# Patient Record
Sex: FEMALE | Race: BLACK OR AFRICAN AMERICAN | NOT HISPANIC OR LATINO
[De-identification: names, ages, dates, MRNs, and addresses within clinical notes are randomized per-mention and may not be internally consistent; named-entity substitution may affect disease eponyms.]

---

## 2017-01-25 ENCOUNTER — APPOINTMENT (OUTPATIENT)
Dept: GASTROENTEROLOGY | Facility: CLINIC | Age: 82
End: 2017-01-25

## 2017-02-01 ENCOUNTER — APPOINTMENT (OUTPATIENT)
Dept: GASTROENTEROLOGY | Facility: CLINIC | Age: 82
End: 2017-02-01

## 2017-04-14 ENCOUNTER — RX RENEWAL (OUTPATIENT)
Age: 82
End: 2017-04-14

## 2017-04-19 ENCOUNTER — APPOINTMENT (OUTPATIENT)
Dept: GASTROENTEROLOGY | Facility: CLINIC | Age: 82
End: 2017-04-19

## 2017-08-02 ENCOUNTER — APPOINTMENT (OUTPATIENT)
Dept: GASTROENTEROLOGY | Facility: CLINIC | Age: 82
End: 2017-08-02
Payer: MEDICARE

## 2017-08-02 VITALS
SYSTOLIC BLOOD PRESSURE: 120 MMHG | WEIGHT: 141 LBS | DIASTOLIC BLOOD PRESSURE: 80 MMHG | OXYGEN SATURATION: 98 % | TEMPERATURE: 98.2 F | HEART RATE: 67 BPM | BODY MASS INDEX: 24.2 KG/M2

## 2017-08-02 DIAGNOSIS — K59.09 OTHER CONSTIPATION: ICD-10-CM

## 2017-08-02 PROCEDURE — 99213 OFFICE O/P EST LOW 20 MIN: CPT

## 2017-11-06 ENCOUNTER — RX RENEWAL (OUTPATIENT)
Age: 82
End: 2017-11-06

## 2017-11-16 ENCOUNTER — INPATIENT (INPATIENT)
Facility: HOSPITAL | Age: 82
LOS: 1 days | Discharge: HOME | End: 2017-11-18
Attending: INTERNAL MEDICINE | Admitting: INTERNAL MEDICINE
Payer: MEDICARE

## 2017-11-16 VITALS
HEIGHT: 64 IN | TEMPERATURE: 98 F | SYSTOLIC BLOOD PRESSURE: 120 MMHG | OXYGEN SATURATION: 100 % | RESPIRATION RATE: 19 BRPM | DIASTOLIC BLOOD PRESSURE: 68 MMHG | WEIGHT: 145.06 LBS | HEART RATE: 76 BPM

## 2017-11-16 DIAGNOSIS — Z90.49 ACQUIRED ABSENCE OF OTHER SPECIFIED PARTS OF DIGESTIVE TRACT: Chronic | ICD-10-CM

## 2017-11-16 DIAGNOSIS — Z98.89 OTHER SPECIFIED POSTPROCEDURAL STATES: Chronic | ICD-10-CM

## 2017-11-16 DIAGNOSIS — Z29.9 ENCOUNTER FOR PROPHYLACTIC MEASURES, UNSPECIFIED: ICD-10-CM

## 2017-11-16 DIAGNOSIS — I10 ESSENTIAL (PRIMARY) HYPERTENSION: ICD-10-CM

## 2017-11-16 DIAGNOSIS — A41.9 SEPSIS, UNSPECIFIED ORGANISM: ICD-10-CM

## 2017-11-16 DIAGNOSIS — D86.0 SARCOIDOSIS OF LUNG: ICD-10-CM

## 2017-11-16 DIAGNOSIS — E78.2 MIXED HYPERLIPIDEMIA: ICD-10-CM

## 2017-11-16 DIAGNOSIS — E11.9 TYPE 2 DIABETES MELLITUS WITHOUT COMPLICATIONS: ICD-10-CM

## 2017-11-16 LAB
ALBUMIN SERPL ELPH-MCNC: 2.6 G/DL — LOW (ref 3.3–5)
ALP SERPL-CCNC: 89 U/L — SIGNIFICANT CHANGE UP (ref 40–120)
ALT FLD-CCNC: 32 U/L — SIGNIFICANT CHANGE UP (ref 12–78)
ANION GAP SERPL CALC-SCNC: 9 MMOL/L — SIGNIFICANT CHANGE UP (ref 5–17)
APPEARANCE UR: CLEAR — SIGNIFICANT CHANGE UP
APTT BLD: 27.5 SEC — SIGNIFICANT CHANGE UP (ref 27.5–37.4)
AST SERPL-CCNC: 31 U/L — SIGNIFICANT CHANGE UP (ref 15–37)
BACTERIA # UR AUTO: ABNORMAL
BILIRUB SERPL-MCNC: 0.3 MG/DL — SIGNIFICANT CHANGE UP (ref 0.2–1.2)
BILIRUB UR-MCNC: NEGATIVE — SIGNIFICANT CHANGE UP
BLD GP AB SCN SERPL QL: SIGNIFICANT CHANGE UP
BUN SERPL-MCNC: 23 MG/DL — SIGNIFICANT CHANGE UP (ref 7–23)
CALCIUM SERPL-MCNC: 9.4 MG/DL — SIGNIFICANT CHANGE UP (ref 8.5–10.1)
CHLORIDE SERPL-SCNC: 100 MMOL/L — SIGNIFICANT CHANGE UP (ref 96–108)
CO2 SERPL-SCNC: 27 MMOL/L — SIGNIFICANT CHANGE UP (ref 22–31)
COLOR SPEC: YELLOW — SIGNIFICANT CHANGE UP
CREAT SERPL-MCNC: 1.12 MG/DL — SIGNIFICANT CHANGE UP (ref 0.5–1.3)
DIFF PNL FLD: ABNORMAL
ELLIPTOCYTES BLD QL SMEAR: SLIGHT — SIGNIFICANT CHANGE UP
EOSINOPHIL NFR BLD AUTO: 4 % — SIGNIFICANT CHANGE UP (ref 0–6)
EPI CELLS # UR: SIGNIFICANT CHANGE UP
GLUCOSE BLDC GLUCOMTR-MCNC: 115 MG/DL — HIGH (ref 70–99)
GLUCOSE SERPL-MCNC: 105 MG/DL — HIGH (ref 70–99)
GLUCOSE UR QL: NEGATIVE MG/DL — SIGNIFICANT CHANGE UP
HCT VFR BLD CALC: 32 % — LOW (ref 34.5–45)
HGB BLD-MCNC: 10.2 G/DL — LOW (ref 11.5–15.5)
HYPOCHROMIA BLD QL: SIGNIFICANT CHANGE UP
INR BLD: 1.01 RATIO — SIGNIFICANT CHANGE UP (ref 0.88–1.16)
KETONES UR-MCNC: NEGATIVE — SIGNIFICANT CHANGE UP
LEUKOCYTE ESTERASE UR-ACNC: ABNORMAL
LG PLATELETS BLD QL AUTO: SLIGHT — SIGNIFICANT CHANGE UP
LIDOCAIN IGE QN: 286 U/L — SIGNIFICANT CHANGE UP (ref 73–393)
LYMPHOCYTES # BLD AUTO: 8 % — LOW (ref 13–44)
MACROCYTES BLD QL: SLIGHT — SIGNIFICANT CHANGE UP
MCHC RBC-ENTMCNC: 26.4 PG — LOW (ref 27–34)
MCHC RBC-ENTMCNC: 31.7 GM/DL — LOW (ref 32–36)
MCV RBC AUTO: 83.3 FL — SIGNIFICANT CHANGE UP (ref 80–100)
MICROCYTES BLD QL: SIGNIFICANT CHANGE UP
MONOCYTES NFR BLD AUTO: 3 % — SIGNIFICANT CHANGE UP (ref 2–14)
NEUTROPHILS NFR BLD AUTO: 85 % — HIGH (ref 43–77)
NEUTS HYPERSEG # BLD: PRESENT — SIGNIFICANT CHANGE UP
NITRITE UR-MCNC: NEGATIVE — SIGNIFICANT CHANGE UP
OVALOCYTES BLD QL SMEAR: SLIGHT — SIGNIFICANT CHANGE UP
PH UR: 6 — SIGNIFICANT CHANGE UP (ref 5–8)
PLAT MORPH BLD: NORMAL — SIGNIFICANT CHANGE UP
PLATELET # BLD AUTO: 447 K/UL — HIGH (ref 150–400)
POLYCHROMASIA BLD QL SMEAR: SLIGHT — SIGNIFICANT CHANGE UP
POTASSIUM SERPL-MCNC: 3.4 MMOL/L — LOW (ref 3.5–5.3)
POTASSIUM SERPL-SCNC: 3.4 MMOL/L — LOW (ref 3.5–5.3)
PROT SERPL-MCNC: 8 GM/DL — SIGNIFICANT CHANGE UP (ref 6–8.3)
PROT UR-MCNC: 30 MG/DL
PROTHROM AB SERPL-ACNC: 11 SEC — SIGNIFICANT CHANGE UP (ref 9.8–12.7)
RBC # BLD: 3.85 M/UL — SIGNIFICANT CHANGE UP (ref 3.8–5.2)
RBC # FLD: 12.5 % — SIGNIFICANT CHANGE UP (ref 11–15)
RBC BLD AUTO: ABNORMAL
RBC CASTS # UR COMP ASSIST: ABNORMAL /HPF (ref 0–4)
SMUDGE CELLS # BLD: PRESENT — SIGNIFICANT CHANGE UP
SODIUM SERPL-SCNC: 136 MMOL/L — SIGNIFICANT CHANGE UP (ref 135–145)
SP GR SPEC: 1.01 — SIGNIFICANT CHANGE UP (ref 1.01–1.02)
STOMATOCYTES BLD QL SMEAR: PRESENT — SIGNIFICANT CHANGE UP
TOXIC GRANULES BLD QL SMEAR: PRESENT — SIGNIFICANT CHANGE UP
TROPONIN I SERPL-MCNC: <.015 NG/ML — SIGNIFICANT CHANGE UP (ref 0.01–0.04)
UROBILINOGEN FLD QL: NEGATIVE MG/DL — SIGNIFICANT CHANGE UP
WBC # BLD: 16.6 K/UL — HIGH (ref 3.8–10.5)
WBC # FLD AUTO: 16.6 K/UL — HIGH (ref 3.8–10.5)
WBC UR QL: ABNORMAL

## 2017-11-16 PROCEDURE — 99285 EMERGENCY DEPT VISIT HI MDM: CPT

## 2017-11-16 PROCEDURE — 93010 ELECTROCARDIOGRAM REPORT: CPT

## 2017-11-16 PROCEDURE — 99223 1ST HOSP IP/OBS HIGH 75: CPT

## 2017-11-16 PROCEDURE — 74176 CT ABD & PELVIS W/O CONTRAST: CPT | Mod: 26

## 2017-11-16 RX ORDER — CEFTRIAXONE 500 MG/1
1 INJECTION, POWDER, FOR SOLUTION INTRAMUSCULAR; INTRAVENOUS EVERY 24 HOURS
Qty: 0 | Refills: 0 | Status: DISCONTINUED | OUTPATIENT
Start: 2017-11-17 | End: 2017-11-18

## 2017-11-16 RX ORDER — METOPROLOL TARTRATE 50 MG
50 TABLET ORAL DAILY
Qty: 0 | Refills: 0 | Status: DISCONTINUED | OUTPATIENT
Start: 2017-11-16 | End: 2017-11-18

## 2017-11-16 RX ORDER — DEXTROSE 50 % IN WATER 50 %
25 SYRINGE (ML) INTRAVENOUS ONCE
Qty: 0 | Refills: 0 | Status: DISCONTINUED | OUTPATIENT
Start: 2017-11-16 | End: 2017-11-18

## 2017-11-16 RX ORDER — INSULIN LISPRO 100/ML
VIAL (ML) SUBCUTANEOUS AT BEDTIME
Qty: 0 | Refills: 0 | Status: DISCONTINUED | OUTPATIENT
Start: 2017-11-16 | End: 2017-11-18

## 2017-11-16 RX ORDER — ONDANSETRON 8 MG/1
4 TABLET, FILM COATED ORAL ONCE
Qty: 0 | Refills: 0 | Status: COMPLETED | OUTPATIENT
Start: 2017-11-16 | End: 2017-11-16

## 2017-11-16 RX ORDER — ATORVASTATIN CALCIUM 80 MG/1
20 TABLET, FILM COATED ORAL AT BEDTIME
Qty: 0 | Refills: 0 | Status: DISCONTINUED | OUTPATIENT
Start: 2017-11-16 | End: 2017-11-18

## 2017-11-16 RX ORDER — PIPERACILLIN AND TAZOBACTAM 4; .5 G/20ML; G/20ML
3.38 INJECTION, POWDER, LYOPHILIZED, FOR SOLUTION INTRAVENOUS ONCE
Qty: 0 | Refills: 0 | Status: COMPLETED | OUTPATIENT
Start: 2017-11-16 | End: 2017-11-16

## 2017-11-16 RX ORDER — ACETAMINOPHEN 500 MG
650 TABLET ORAL EVERY 6 HOURS
Qty: 0 | Refills: 0 | Status: DISCONTINUED | OUTPATIENT
Start: 2017-11-16 | End: 2017-11-18

## 2017-11-16 RX ORDER — SODIUM CHLORIDE 9 MG/ML
1000 INJECTION, SOLUTION INTRAVENOUS
Qty: 0 | Refills: 0 | Status: DISCONTINUED | OUTPATIENT
Start: 2017-11-16 | End: 2017-11-18

## 2017-11-16 RX ORDER — SODIUM CHLORIDE 9 MG/ML
1000 INJECTION INTRAMUSCULAR; INTRAVENOUS; SUBCUTANEOUS
Qty: 0 | Refills: 0 | Status: DISCONTINUED | OUTPATIENT
Start: 2017-11-16 | End: 2017-11-18

## 2017-11-16 RX ORDER — INSULIN GLARGINE 100 [IU]/ML
10 INJECTION, SOLUTION SUBCUTANEOUS AT BEDTIME
Qty: 0 | Refills: 0 | Status: DISCONTINUED | OUTPATIENT
Start: 2017-11-16 | End: 2017-11-18

## 2017-11-16 RX ORDER — ONDANSETRON 8 MG/1
4 TABLET, FILM COATED ORAL EVERY 8 HOURS
Qty: 0 | Refills: 0 | Status: DISCONTINUED | OUTPATIENT
Start: 2017-11-16 | End: 2017-11-18

## 2017-11-16 RX ORDER — INSULIN LISPRO 100/ML
VIAL (ML) SUBCUTANEOUS
Qty: 0 | Refills: 0 | Status: DISCONTINUED | OUTPATIENT
Start: 2017-11-16 | End: 2017-11-18

## 2017-11-16 RX ORDER — AMLODIPINE BESYLATE 2.5 MG/1
5 TABLET ORAL DAILY
Qty: 0 | Refills: 0 | Status: DISCONTINUED | OUTPATIENT
Start: 2017-11-16 | End: 2017-11-18

## 2017-11-16 RX ORDER — HYDROCHLOROTHIAZIDE 25 MG
12.5 TABLET ORAL DAILY
Qty: 0 | Refills: 0 | Status: DISCONTINUED | OUTPATIENT
Start: 2017-11-16 | End: 2017-11-18

## 2017-11-16 RX ORDER — DEXTROSE 50 % IN WATER 50 %
1 SYRINGE (ML) INTRAVENOUS ONCE
Qty: 0 | Refills: 0 | Status: DISCONTINUED | OUTPATIENT
Start: 2017-11-16 | End: 2017-11-18

## 2017-11-16 RX ORDER — HEPARIN SODIUM 5000 [USP'U]/ML
5000 INJECTION INTRAVENOUS; SUBCUTANEOUS EVERY 8 HOURS
Qty: 0 | Refills: 0 | Status: DISCONTINUED | OUTPATIENT
Start: 2017-11-16 | End: 2017-11-18

## 2017-11-16 RX ORDER — GLUCAGON INJECTION, SOLUTION 0.5 MG/.1ML
1 INJECTION, SOLUTION SUBCUTANEOUS ONCE
Qty: 0 | Refills: 0 | Status: DISCONTINUED | OUTPATIENT
Start: 2017-11-16 | End: 2017-11-18

## 2017-11-16 RX ORDER — VALSARTAN 80 MG/1
80 TABLET ORAL DAILY
Qty: 0 | Refills: 0 | Status: DISCONTINUED | OUTPATIENT
Start: 2017-11-16 | End: 2017-11-18

## 2017-11-16 RX ORDER — SODIUM CHLORIDE 9 MG/ML
1000 INJECTION INTRAMUSCULAR; INTRAVENOUS; SUBCUTANEOUS ONCE
Qty: 0 | Refills: 0 | Status: COMPLETED | OUTPATIENT
Start: 2017-11-16 | End: 2017-11-16

## 2017-11-16 RX ORDER — POTASSIUM CHLORIDE 20 MEQ
20 PACKET (EA) ORAL ONCE
Qty: 0 | Refills: 0 | Status: COMPLETED | OUTPATIENT
Start: 2017-11-16 | End: 2017-11-16

## 2017-11-16 RX ORDER — DEXTROSE 50 % IN WATER 50 %
12.5 SYRINGE (ML) INTRAVENOUS ONCE
Qty: 0 | Refills: 0 | Status: DISCONTINUED | OUTPATIENT
Start: 2017-11-16 | End: 2017-11-18

## 2017-11-16 RX ADMIN — PIPERACILLIN AND TAZOBACTAM 200 GRAM(S): 4; .5 INJECTION, POWDER, LYOPHILIZED, FOR SOLUTION INTRAVENOUS at 22:02

## 2017-11-16 RX ADMIN — Medication 20 MILLIEQUIVALENT(S): at 18:52

## 2017-11-16 RX ADMIN — ONDANSETRON 4 MILLIGRAM(S): 8 TABLET, FILM COATED ORAL at 16:32

## 2017-11-16 RX ADMIN — HEPARIN SODIUM 5000 UNIT(S): 5000 INJECTION INTRAVENOUS; SUBCUTANEOUS at 22:03

## 2017-11-16 RX ADMIN — SODIUM CHLORIDE 2000 MILLILITER(S): 9 INJECTION INTRAMUSCULAR; INTRAVENOUS; SUBCUTANEOUS at 16:33

## 2017-11-16 NOTE — ED ADULT NURSE NOTE - PSH
H/O cataract extraction, right  with laser revision 3/2016  S/P appendectomy    S/P cholecystectomy  11/2015  S/P hysterectomy  with BLSO  S/P knee surgery  for right torn meniscual

## 2017-11-16 NOTE — ED PROVIDER NOTE - OBJECTIVE STATEMENT
Pertinent PMH/PSH/FHx/SHx and Review of Systems contained within:    81 y/o F w hx of htn, hld, DM, "vasculitis of my kidney", presents w vomiting; patient reports she was in [facility] for 4 days and returned yesterday; patient reports vomiting and inability to tolerate PO intake since yesterday; denies abd pain; denies headache; denies diarrhea; last BM was about 5 or 6 days ago; denies abd distention; passing flatus; patient reports she was diagnosed w UTI while in [place] and was treated with 2 doses of cipro; denies malodorous urine or urine discomfort which she previously had; denies fever or chills or flank pain; ROS is otherwise negative    PMH: as above  allergeis: sulfa; codeine; IV contrast; iodine  SH: denies cig or drug use

## 2017-11-16 NOTE — H&P ADULT - ASSESSMENT
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  Patient will require admission for at least 2 midnights for IV abx treatment of UTI as detailed below:    IMPROVE VTE Individual Risk Assessment          RISK                                                          Points    [  ] Previous VTE                                                3    [  ] Thrombophilia                                             2    [  ] Lower limb paralysis                                    2        (unable to hold up >15 seconds)      [  ] Current Cancer                                             2         (within 6 months)    [ x ] Immobilization > 24 hrs                              1    [  ] ICU/CCU stay > 24 hours                            1    [ x ] Age > 60                                                    1    IMPROVE VTE Score _______2__

## 2017-11-16 NOTE — H&P ADULT - PROBLEM SELECTOR PLAN 1
Started on Zosyn in ED, can de-escalate to Rocephin  Urine Cx, Blood Cx 2 sets.  Monitor CBC & BMP Qdaily.  Start IV Fluids NS@80ml/hr for 24 hrs, then re-evaluate.  Tylenol 650mg po q6hrs PRN for fever.  Zofran PRN

## 2017-11-16 NOTE — ED ADULT TRIAGE NOTE - CHIEF COMPLAINT QUOTE
patient c/o of vomiting started yesterday , denied any pain , no dizziness , patient come yesterday from [employer] , denied diarrhea

## 2017-11-16 NOTE — ED PROVIDER NOTE - PROGRESS NOTE DETAILS
Precious: pt signed out to me, neg ct scan, elevated wbc, plan for admission given vomiting and recent uti. Dr. Car aware. received abd and fluid. no severe sepsis/septic shock based on vitals.

## 2017-11-16 NOTE — ED ADULT NURSE NOTE - PMH
Anemia    ARF (acute renal failure)  with glomerulonephritis  Ectopic fetus    Hernia  as a child  HLD (hyperlipidemia)    HTN (hypertension)    Mass  calcified mass left hip area, 2011  Murmur    Sarcoidosis of lung

## 2017-11-16 NOTE — ED ADULT NURSE NOTE - CHIEF COMPLAINT QUOTE
patient c/o of vomiting started yesterday , denied any pain , no dizziness , patient come yesterday from [place] , denied diarrhea

## 2017-11-16 NOTE — H&P ADULT - NSHPLABSRESULTS_GEN_ALL_CORE
Vital Signs Last 24 Hrs  T(C): 36.8 (2017 21:03), Max: 36.8 (2017 21:03)  T(F): 98.2 (2017 21:03), Max: 98.2 (2017 21:03)  HR: 89 (2017 21:03) (76 - 89)  BP: 123/80 (2017 21:03) (120/68 - 123/80)  BP(mean): --  RR: 18 (2017 21:03) (18 - 19)  SpO2: 99% (2017 21:03) (99% - 100%)        LABS:                        10.2   16.6  )-----------( 447      ( 2017 17:37 )             32.0     11-16    136  |  100  |  23  ----------------------------<  105<H>  3.4<L>   |  27  |  1.12    Ca    9.4      2017 17:37    TPro  8.0  /  Alb  2.6<L>  /  TBili  0.3  /  DBili  x   /  AST  31  /  ALT  32  /  AlkPhos  89  11-16    PT/INR - ( 2017 17:37 )   PT: 11.0 sec;   INR: 1.01 ratio         PTT - ( 2017 17:37 )  PTT:27.5 sec  Urinalysis Basic - ( 2017 19:09 )    Color: Yellow / Appearance: Clear / S.010 / pH: x  Gluc: x / Ketone: Negative  / Bili: Negative / Urobili: Negative mg/dL   Blood: x / Protein: 30 mg/dL / Nitrite: Negative   Leuk Esterase: Trace / RBC: 25-50 /HPF / WBC 6-10   Sq Epi: x / Non Sq Epi: Few / Bacteria: Moderate        RADIOLOGY & ADDITIONAL STUDIES:    CT ab/plv:  IMPRESSION:   No bowel obstruction.

## 2017-11-16 NOTE — ED PROVIDER NOTE - MEDICAL DECISION MAKING DETAILS
A/P: 83 y/o F w vomiting since yesterday; abd exam benign; neuro intact; will get ekg, labs, ua/culture, treat symptomatically and reassess

## 2017-11-16 NOTE — ED PROVIDER NOTE - PHYSICAL EXAMINATION
Gen: well appearing, in no distress  HEENT: clear oropharynx; EOMI; PERRL ~ 2mm;   neck: full ROM  CV: RRR; no m/g/r  lungs;CTAB; no w/r/r  abd: soft, nd, nt; non tender in all areas of abdomen; no cva ttp  back: non tender  ext: wwp; full ROM  neuro: CN 2-12 nl; no focal weakness/sensory changes

## 2017-11-16 NOTE — ED ADULT NURSE REASSESSMENT NOTE - NS ED NURSE REASSESS COMMENT FT1
report given by outgoing RN, patient is in CAT scan, patient back to bed, remains alert and orientedx3, no complaints made, no distress/discomfort observed

## 2017-11-16 NOTE — H&P ADULT - HISTORY OF PRESENT ILLNESS
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  She returned yesterday after spending 4 days there and her symptoms have not improved.  She reports lower abdominal pain, fever, chills, and dysuria.      In the ED, CT ab shows leukocytosis with left shift, UA + for UTI, Tmax 101.3.  CT ab/plv neg for acute findings. 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  She returned yesterday after spending 4 days there and her symptoms have not improved.  She reports lower abdominal pain, fever, chills, and dysuria.      In the ED, CT ab shows leukocytosis with left shift, UA + for UTI, Tmax 101.3.  CT ab/plv neg for acute findings..

## 2017-11-16 NOTE — H&P ADULT - NSHPREVIEWOFSYSTEMS_GEN_ALL_CORE
No photophobia/eye pain/changes in vision,  No chest pain/palpitations, no SOB/cough/wheeze/stridor,  No neck/back pain, no rash, no changes in neurological status/function.

## 2017-11-16 NOTE — H&P ADULT - FAMILY HISTORY
Sibling  Still living? Yes, Estimated age: Age Unknown  Type 2 diabetes mellitus, Age at diagnosis: Age Unknown

## 2017-11-16 NOTE — ED ADULT NURSE NOTE - OBJECTIVE STATEMENT
patient is a 82yr old female, a&ox3c/o of vomiting started yesterday , denied any pain , no dizziness , patient come yesterday from [place] , denied diarrhea

## 2017-11-17 LAB
ANION GAP SERPL CALC-SCNC: 10 MMOL/L — SIGNIFICANT CHANGE UP (ref 5–17)
BUN SERPL-MCNC: 18 MG/DL — SIGNIFICANT CHANGE UP (ref 7–23)
CALCIUM SERPL-MCNC: 9 MG/DL — SIGNIFICANT CHANGE UP (ref 8.5–10.1)
CHLORIDE SERPL-SCNC: 104 MMOL/L — SIGNIFICANT CHANGE UP (ref 96–108)
CHOLEST SERPL-MCNC: 138 MG/DL — SIGNIFICANT CHANGE UP (ref 10–199)
CO2 SERPL-SCNC: 26 MMOL/L — SIGNIFICANT CHANGE UP (ref 22–31)
CREAT SERPL-MCNC: 1.08 MG/DL — SIGNIFICANT CHANGE UP (ref 0.5–1.3)
CULTURE RESULTS: NO GROWTH — SIGNIFICANT CHANGE UP
GLUCOSE BLDC GLUCOMTR-MCNC: 127 MG/DL — HIGH (ref 70–99)
GLUCOSE BLDC GLUCOMTR-MCNC: 130 MG/DL — HIGH (ref 70–99)
GLUCOSE BLDC GLUCOMTR-MCNC: 143 MG/DL — HIGH (ref 70–99)
GLUCOSE SERPL-MCNC: 113 MG/DL — HIGH (ref 70–99)
HBA1C BLD-MCNC: 6.5 % — HIGH (ref 4–5.6)
HCT VFR BLD CALC: 28.9 % — LOW (ref 34.5–45)
HDLC SERPL-MCNC: 55 MG/DL — SIGNIFICANT CHANGE UP (ref 40–125)
HGB BLD-MCNC: 9.3 G/DL — LOW (ref 11.5–15.5)
LIPID PNL WITH DIRECT LDL SERPL: 68 MG/DL — SIGNIFICANT CHANGE UP
MCHC RBC-ENTMCNC: 26.3 PG — LOW (ref 27–34)
MCHC RBC-ENTMCNC: 32.3 GM/DL — SIGNIFICANT CHANGE UP (ref 32–36)
MCV RBC AUTO: 81.4 FL — SIGNIFICANT CHANGE UP (ref 80–100)
PLATELET # BLD AUTO: 412 K/UL — HIGH (ref 150–400)
POTASSIUM SERPL-MCNC: 3.7 MMOL/L — SIGNIFICANT CHANGE UP (ref 3.5–5.3)
POTASSIUM SERPL-SCNC: 3.7 MMOL/L — SIGNIFICANT CHANGE UP (ref 3.5–5.3)
RBC # BLD: 3.56 M/UL — LOW (ref 3.8–5.2)
RBC # FLD: 12.9 % — SIGNIFICANT CHANGE UP (ref 11–15)
SODIUM SERPL-SCNC: 140 MMOL/L — SIGNIFICANT CHANGE UP (ref 135–145)
SPECIMEN SOURCE: SIGNIFICANT CHANGE UP
TOTAL CHOLESTEROL/HDL RATIO MEASUREMENT: 2.5 RATIO — LOW (ref 3.3–7.1)
TRIGL SERPL-MCNC: 76 MG/DL — SIGNIFICANT CHANGE UP (ref 10–149)
WBC # BLD: 12.4 K/UL — HIGH (ref 3.8–10.5)
WBC # FLD AUTO: 12.4 K/UL — HIGH (ref 3.8–10.5)

## 2017-11-17 PROCEDURE — 99233 SBSQ HOSP IP/OBS HIGH 50: CPT

## 2017-11-17 RX ORDER — LACTULOSE 10 G/15ML
10 SOLUTION ORAL
Qty: 0 | Refills: 0 | Status: COMPLETED | OUTPATIENT
Start: 2017-11-17 | End: 2017-11-18

## 2017-11-17 RX ADMIN — LACTULOSE 10 GRAM(S): 10 SOLUTION ORAL at 15:01

## 2017-11-17 RX ADMIN — SODIUM CHLORIDE 80 MILLILITER(S): 9 INJECTION INTRAMUSCULAR; INTRAVENOUS; SUBCUTANEOUS at 15:03

## 2017-11-17 RX ADMIN — AMLODIPINE BESYLATE 5 MILLIGRAM(S): 2.5 TABLET ORAL at 12:00

## 2017-11-17 RX ADMIN — HEPARIN SODIUM 5000 UNIT(S): 5000 INJECTION INTRAVENOUS; SUBCUTANEOUS at 15:00

## 2017-11-17 RX ADMIN — Medication 650 MILLIGRAM(S): at 00:28

## 2017-11-17 RX ADMIN — CEFTRIAXONE 100 GRAM(S): 500 INJECTION, POWDER, FOR SOLUTION INTRAMUSCULAR; INTRAVENOUS at 05:58

## 2017-11-17 RX ADMIN — Medication 1 DROP(S): at 21:40

## 2017-11-17 RX ADMIN — Medication 12.5 MILLIGRAM(S): at 11:59

## 2017-11-17 RX ADMIN — Medication 10 MILLIGRAM(S): at 12:00

## 2017-11-17 RX ADMIN — HEPARIN SODIUM 5000 UNIT(S): 5000 INJECTION INTRAVENOUS; SUBCUTANEOUS at 21:39

## 2017-11-17 RX ADMIN — Medication 10 MILLIGRAM(S): at 05:58

## 2017-11-17 RX ADMIN — ATORVASTATIN CALCIUM 20 MILLIGRAM(S): 80 TABLET, FILM COATED ORAL at 21:39

## 2017-11-17 RX ADMIN — INSULIN GLARGINE 10 UNIT(S): 100 INJECTION, SOLUTION SUBCUTANEOUS at 22:42

## 2017-11-17 RX ADMIN — SODIUM CHLORIDE 80 MILLILITER(S): 9 INJECTION INTRAMUSCULAR; INTRAVENOUS; SUBCUTANEOUS at 00:32

## 2017-11-17 RX ADMIN — HEPARIN SODIUM 5000 UNIT(S): 5000 INJECTION INTRAVENOUS; SUBCUTANEOUS at 05:58

## 2017-11-17 NOTE — PROGRESS NOTE ADULT - PROBLEM SELECTOR PLAN 1
Started on Zosyn in ED,   changed to  Rocephin  f/u Urine Cx, Blood Cx 2 sets.  Monitor CBC & BMP Qdaily.  Start IV Fluids NS@80ml/hr for 24 hrs, then re-evaluate.  Tylenol 650mg po q6hrs PRN for fever.  Zofran PRN

## 2017-11-17 NOTE — PROGRESS NOTE ADULT - SUBJECTIVE AND OBJECTIVE BOX
Patient is a 82y old  Female who presents with a chief complaint of vomiting x 1  1/2 day (2017 23:04)      OVERNIGHT EVENTS: none   ROS: nausea /vomiting reolsved      MEDICATIONS  (STANDING):  amLODIPine   Tablet 5 milliGRAM(s) Oral daily  artificial  tears Solution 1 Drop(s) Both EYES three times a day  atorvastatin 20 milliGRAM(s) Oral at bedtime  cefTRIAXone   IVPB 1 Gram(s) IV Intermittent every 24 hours  dextrose 5%. 1000 milliLiter(s) (50 mL/Hr) IV Continuous <Continuous>  dextrose 50% Injectable 12.5 Gram(s) IV Push once  dextrose 50% Injectable 25 Gram(s) IV Push once  dextrose 50% Injectable 25 Gram(s) IV Push once  heparin  Injectable 5000 Unit(s) SubCutaneous every 8 hours  hydrochlorothiazide 12.5 milliGRAM(s) Oral daily  insulin glargine Injectable (LANTUS) 10 Unit(s) SubCutaneous at bedtime  insulin lispro (HumaLOG) corrective regimen sliding scale   SubCutaneous three times a day before meals  insulin lispro (HumaLOG) corrective regimen sliding scale   SubCutaneous at bedtime  lactulose Syrup 10 Gram(s) Oral two times a day  metoprolol succinate ER 50 milliGRAM(s) Oral daily  PARoxetine 10 milliGRAM(s) Oral daily  predniSONE   Tablet 10 milliGRAM(s) Oral daily  sodium chloride 0.9%. 1000 milliLiter(s) (80 mL/Hr) IV Continuous <Continuous>  valsartan 80 milliGRAM(s) Oral daily    MEDICATIONS  (PRN):  acetaminophen   Tablet 650 milliGRAM(s) Oral every 6 hours PRN For Temp greater than 38 C (100.4 F)  dextrose Gel 1 Dose(s) Oral once PRN Blood Glucose LESS THAN 70 milliGRAM(s)/deciliter  glucagon  Injectable 1 milliGRAM(s) IntraMuscular once PRN Glucose LESS THAN 70 milligrams/deciliter  ondansetron Injectable 4 milliGRAM(s) IV Push every 8 hours PRN Nausea and/or Vomiting      Allergies    codeine (Pruritus)  iodine (Hives)  morphine (Pruritus)  shellfish (Hives)  sulfa drugs (Hives)    Intolerances        SUBJECTIVE: sitting in chair eating in bed in nad ..  bed in NAD, no acute events overnight     Vital Signs Last 24 Hrs  T(C): 37 (2017 16:59), Max: 38 (2017 23:38)  T(F): 98.6 (2017 16:59), Max: 100.4 (2017 23:38)  HR: 76 (2017 16:59) (63 - 94)  BP: 117/72 (2017 16:59) (94/55 - 133/75)  BP(mean): --  RR: 18 (2017 16:59) (17 - 18)  SpO2: 98% (2017 16:59) (97% - 100%)    PHYSICAL EXAM:  GENERAL: NAD, well-groomed, well-developed  HEAD:  Atraumatic, Normocephalic  EYES: EOMI, PERRLA, conjunctiva and sclera clear  ENMT: No tonsillar erythema, exudates, or enlargement; Moist mucous membranes, Good dentition, No lesions  NECK: Supple, No JVD, Normal thyroid  CHEST/LUNG: Clear to  auscultation bilaterally; No rales, rhonchi, wheezing, or rubs  bilaterally  HEART: Regular rate and rhythm; No murmurs, rubs, or gallops  ABDOMEN: Soft, Nontender, Nondistended; Bowel sounds present  EXTREMITIES:  2+ Peripheral Pulses, No clubbing, cyanosis, or edema BL LE    SKIN: No rashes or lesions  NERVOUS SYSTEM:  Alert & Oriented X3, Good concentration; Motor Strength 5/5 B/L upper and lower extremities;   DTRs 2+ intact and symmetric, sensation intact BL    LABS:                        9.3    12.4  )-----------( 412      ( 2017 09:36 )             28.9     11-17    140  |  104  |  18  ----------------------------<  113<H>  3.7   |  26  |  1.08    Ca    9.0      2017 09:36    TPro  8.0  /  Alb  2.6<L>  /  TBili  0.3  /  DBili  x   /  AST  31  /  ALT  32  /  AlkPhos  89  11-16    PT/INR - ( 2017 17:37 )   PT: 11.0 sec;   INR: 1.01 ratio         PTT - ( 2017 17:37 )  PTT:27.5 sec  Urinalysis Basic - ( 2017 19:09 )    Color: Yellow / Appearance: Clear / S.010 / pH: x  Gluc: x / Ketone: Negative  / Bili: Negative / Urobili: Negative mg/dL   Blood: x / Protein: 30 mg/dL / Nitrite: Negative   Leuk Esterase: Trace / RBC: 25-50 /HPF / WBC 6-10   Sq Epi: x / Non Sq Epi: Few / Bacteria: Moderate      Cultures;   CAPILLARY BLOOD GLUCOSE      POCT Blood Glucose.: 130 mg/dL (2017 16:03)  POCT Blood Glucose.: 143 mg/dL (2017 11:52)  POCT Blood Glucose.: 93 mg/dL (2017 08:19)  POCT Blood Glucose.: 120 mg/dL (2017 00:30)    Lipid panel:   LDL 68  TG 76    CARDIAC MARKERS ( 2017 17:37 )  <.015 ng/mL / x     / x     / x     / x            RADIOLOGY & ADDITIONAL TESTS:      Imaging Personally Reviewed:  [ ] YES      Consultant(s) Notes Reviewed:  [ ] YES     Care Discussed with [ ] Consultants [X ] Patient [ ] Family  [x ]    [x ]  Other; RN

## 2017-11-17 NOTE — PROGRESS NOTE ADULT - ASSESSMENT
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  Patient will require admission for at least 2 midnights for IV abx treatment of UTI as detailed below:

## 2017-11-18 ENCOUNTER — TRANSCRIPTION ENCOUNTER (OUTPATIENT)
Age: 82
End: 2017-11-18

## 2017-11-18 VITALS
DIASTOLIC BLOOD PRESSURE: 78 MMHG | HEART RATE: 85 BPM | OXYGEN SATURATION: 97 % | SYSTOLIC BLOOD PRESSURE: 130 MMHG | TEMPERATURE: 99 F | RESPIRATION RATE: 17 BRPM

## 2017-11-18 LAB
ANION GAP SERPL CALC-SCNC: 9 MMOL/L — SIGNIFICANT CHANGE UP (ref 5–17)
BUN SERPL-MCNC: 16 MG/DL — SIGNIFICANT CHANGE UP (ref 7–23)
CALCIUM SERPL-MCNC: 8.9 MG/DL — SIGNIFICANT CHANGE UP (ref 8.5–10.1)
CHLORIDE SERPL-SCNC: 104 MMOL/L — SIGNIFICANT CHANGE UP (ref 96–108)
CO2 SERPL-SCNC: 28 MMOL/L — SIGNIFICANT CHANGE UP (ref 22–31)
CREAT SERPL-MCNC: 0.87 MG/DL — SIGNIFICANT CHANGE UP (ref 0.5–1.3)
GLUCOSE BLDC GLUCOMTR-MCNC: 113 MG/DL — HIGH (ref 70–99)
GLUCOSE BLDC GLUCOMTR-MCNC: 137 MG/DL — HIGH (ref 70–99)
GLUCOSE SERPL-MCNC: 100 MG/DL — HIGH (ref 70–99)
HCT VFR BLD CALC: 29.1 % — LOW (ref 34.5–45)
HGB BLD-MCNC: 9.1 G/DL — LOW (ref 11.5–15.5)
MAGNESIUM SERPL-MCNC: 1.9 MG/DL — SIGNIFICANT CHANGE UP (ref 1.6–2.6)
MCHC RBC-ENTMCNC: 26.2 PG — LOW (ref 27–34)
MCHC RBC-ENTMCNC: 31.4 GM/DL — LOW (ref 32–36)
MCV RBC AUTO: 83.4 FL — SIGNIFICANT CHANGE UP (ref 80–100)
PHOSPHATE SERPL-MCNC: 2.2 MG/DL — LOW (ref 2.5–4.5)
PLATELET # BLD AUTO: 371 K/UL — SIGNIFICANT CHANGE UP (ref 150–400)
POTASSIUM SERPL-MCNC: 3.2 MMOL/L — LOW (ref 3.5–5.3)
POTASSIUM SERPL-SCNC: 3.2 MMOL/L — LOW (ref 3.5–5.3)
RBC # BLD: 3.49 M/UL — LOW (ref 3.8–5.2)
RBC # FLD: 13 % — SIGNIFICANT CHANGE UP (ref 11–15)
SODIUM SERPL-SCNC: 141 MMOL/L — SIGNIFICANT CHANGE UP (ref 135–145)
WBC # BLD: 12.3 K/UL — HIGH (ref 3.8–10.5)
WBC # FLD AUTO: 12.3 K/UL — HIGH (ref 3.8–10.5)

## 2017-11-18 PROCEDURE — 99238 HOSP IP/OBS DSCHRG MGMT 30/<: CPT

## 2017-11-18 RX ORDER — POTASSIUM CHLORIDE 20 MEQ
40 PACKET (EA) ORAL ONCE
Qty: 0 | Refills: 0 | Status: COMPLETED | OUTPATIENT
Start: 2017-11-18 | End: 2017-11-18

## 2017-11-18 RX ORDER — POTASSIUM PHOSPHATE, MONOBASIC POTASSIUM PHOSPHATE, DIBASIC 236; 224 MG/ML; MG/ML
15 INJECTION, SOLUTION INTRAVENOUS ONCE
Qty: 0 | Refills: 0 | Status: COMPLETED | OUTPATIENT
Start: 2017-11-18 | End: 2017-11-18

## 2017-11-18 RX ORDER — CEFPODOXIME PROXETIL 100 MG
2 TABLET ORAL
Qty: 20 | Refills: 0
Start: 2017-11-18 | End: 2017-11-23

## 2017-11-18 RX ADMIN — AMLODIPINE BESYLATE 5 MILLIGRAM(S): 2.5 TABLET ORAL at 05:36

## 2017-11-18 RX ADMIN — VALSARTAN 80 MILLIGRAM(S): 80 TABLET ORAL at 05:36

## 2017-11-18 RX ADMIN — Medication 50 MILLIGRAM(S): at 05:36

## 2017-11-18 RX ADMIN — Medication 10 MILLIGRAM(S): at 05:36

## 2017-11-18 RX ADMIN — Medication 40 MILLIEQUIVALENT(S): at 11:04

## 2017-11-18 RX ADMIN — LACTULOSE 10 GRAM(S): 10 SOLUTION ORAL at 05:35

## 2017-11-18 RX ADMIN — POTASSIUM PHOSPHATE, MONOBASIC POTASSIUM PHOSPHATE, DIBASIC 63.75 MILLIMOLE(S): 236; 224 INJECTION, SOLUTION INTRAVENOUS at 11:16

## 2017-11-18 RX ADMIN — Medication 1 DROP(S): at 05:35

## 2017-11-18 RX ADMIN — HEPARIN SODIUM 5000 UNIT(S): 5000 INJECTION INTRAVENOUS; SUBCUTANEOUS at 15:15

## 2017-11-18 RX ADMIN — Medication 12.5 MILLIGRAM(S): at 05:36

## 2017-11-18 RX ADMIN — HEPARIN SODIUM 5000 UNIT(S): 5000 INJECTION INTRAVENOUS; SUBCUTANEOUS at 05:36

## 2017-11-18 RX ADMIN — CEFTRIAXONE 100 GRAM(S): 500 INJECTION, POWDER, FOR SOLUTION INTRAMUSCULAR; INTRAVENOUS at 05:35

## 2017-11-18 RX ADMIN — Medication 10 MILLIGRAM(S): at 11:04

## 2017-11-18 RX ADMIN — Medication 1 DROP(S): at 15:14

## 2017-11-18 NOTE — DISCHARGE NOTE ADULT - SECONDARY DIAGNOSIS.
Sarcoidosis of lung Type 2 diabetes mellitus without complication, without long-term current use of insulin Essential hypertension Hyperlipidemia, unspecified hyperlipidemia type

## 2017-11-18 NOTE — DISCHARGE NOTE ADULT - HOSPITAL COURSE
Assessment and Plan:   · Assessment	  82 year old woman with past medical history of HTN, HLD, type 2 diabetes, sarcoidosis of lung, CKD with glomerulonephritis, arthritis in b/l knees, presents after being treated with only 2 days of cipro for UTI while in [place].  Patient will require admission for at least 2 midnights for IV abx treatment of UTI as detailed below:         Problem/Plan - 1:  ·  Problem: Sepsis secondary to UTI.    urin culture  ngtd    blood culture  ngtd   continue with vantin as outpt          Problem/Plan - 2:  ·  Problem: Sarcoidosis of lung.  Plan: Recently started on prednisone 10 mg PO daily by rheumatologist, continue. and follow up with her rheum      Problem/Plan - 3:  ·  Problem: Essential hypertension.  Plan: Continue metoprolol.      Problem/Plan - 4:  ·  Problem: Type 2 diabetes mellitus without complication, without long-term current use of insulin.  Plan: Lantus 10 units SC QHS  Sliding scale with coverage   f/u A1C, lipids.      Problem/Plan - 5:  ·  Problem: Mixed hyperlipidemia.  Plan: Continue Lipitor

## 2017-11-18 NOTE — DISCHARGE NOTE ADULT - CARE PLAN
Principal Discharge DX:	Sepsis secondary to UTI  Goal:	continue with antibx  Instructions for follow-up, activity and diet:	complete antibiotics  Secondary Diagnosis:	Sarcoidosis of lung  Goal:	follow up with your own rheumatology doctor and continue with meds  Secondary Diagnosis:	Type 2 diabetes mellitus without complication, without long-term current use of insulin  Goal:	continue with your meds  Secondary Diagnosis:	Essential hypertension  Goal:	continue with meds  Secondary Diagnosis:	Hyperlipidemia, unspecified hyperlipidemia type  Goal:	continue with meds

## 2017-11-18 NOTE — DISCHARGE NOTE ADULT - MEDICATION SUMMARY - MEDICATIONS TO TAKE
I will START or STAY ON the medications listed below when I get home from the hospital:    glucometer  -- check blood sugar twice a day before meals  -- Indication: For general    lancet  -- for BGM twice a day  -- Indication: For general    test strips  -- for BGM twice a day  -- Indication: For general    predniSONE 10 mg oral tablet  -- 1 tab(s) by mouth once a day  -- Indication: For Sarcoidosis of lung    Paxil 10 mg oral tablet  -- 1 tab(s) by mouth once a day  -- Indication: For depression    metFORMIN 500 mg oral tablet, extended release  -- 1 tab(s) by mouth once a day after dinner  -- Indication: For dm    Lipitor 20 mg oral tablet  -- 1 tab(s) by mouth once a day (at bedtime)  -- Indication: For hld    Diovan HCT 80 mg-12.5 mg oral tablet  -- 1 tab(s) by mouth once a day in am  -- Indication: For htn    Ambien 10 mg oral tablet  -- 1 tab(s) by mouth once a day (at bedtime), As Needed  -- Indication: For insomia    metoprolol succinate 50 mg oral tablet, extended release  -- 1 tab(s) by mouth once a day  -- Indication: For htn    Norvasc 5 mg oral tablet  -- 1 tab(s) by mouth once a day  -- Indication: For htn    cefpodoxime 200 mg oral tablet  -- 2 tab(s) by mouth 2 times a day   -- Finish all this medication unless otherwise directed by prescriber.  Take with food or milk.    -- Indication: For uti    Linzess 290 mcg oral capsule  -- 1 cap(s) by mouth once a day  -- Indication: For genarl    ocular lubricant ophthalmic solution  -- 1 drop(s) to each affected eye 3 times a day, As Needed dry eyes  -- Indication: For general

## 2017-11-18 NOTE — DISCHARGE NOTE ADULT - PATIENT PORTAL LINK FT
“You can access the [employer] Patient Portal, offered by [facility], by registering with the following website: [email or web address]”

## 2017-11-18 NOTE — DISCHARGE NOTE ADULT - PLAN OF CARE
continue with antibx complete antibiotics follow up with your own rheumatology doctor and continue with meds continue with your meds continue with meds

## 2017-11-22 DIAGNOSIS — I12.9 HYPERTENSIVE CHRONIC KIDNEY DISEASE WITH STAGE 1 THROUGH STAGE 4 CHRONIC KIDNEY DISEASE, OR UNSPECIFIED CHRONIC KIDNEY DISEASE: ICD-10-CM

## 2017-11-22 DIAGNOSIS — N39.0 URINARY TRACT INFECTION, SITE NOT SPECIFIED: ICD-10-CM

## 2017-11-22 DIAGNOSIS — N18.9 CHRONIC KIDNEY DISEASE, UNSPECIFIED: ICD-10-CM

## 2017-11-22 DIAGNOSIS — A41.9 SEPSIS, UNSPECIFIED ORGANISM: ICD-10-CM

## 2017-11-22 DIAGNOSIS — Z88.5 ALLERGY STATUS TO NARCOTIC AGENT: ICD-10-CM

## 2017-11-22 DIAGNOSIS — E78.2 MIXED HYPERLIPIDEMIA: ICD-10-CM

## 2017-11-22 DIAGNOSIS — D86.0 SARCOIDOSIS OF LUNG: ICD-10-CM

## 2017-11-22 DIAGNOSIS — Z79.84 LONG TERM (CURRENT) USE OF ORAL HYPOGLYCEMIC DRUGS: ICD-10-CM

## 2017-11-22 DIAGNOSIS — I77.89 OTHER SPECIFIED DISORDERS OF ARTERIES AND ARTERIOLES: ICD-10-CM

## 2017-11-22 DIAGNOSIS — Z91.013 ALLERGY TO SEAFOOD: ICD-10-CM

## 2017-11-22 DIAGNOSIS — Z88.2 ALLERGY STATUS TO SULFONAMIDES: ICD-10-CM

## 2017-11-22 DIAGNOSIS — E11.22 TYPE 2 DIABETES MELLITUS WITH DIABETIC CHRONIC KIDNEY DISEASE: ICD-10-CM

## 2017-11-22 DIAGNOSIS — Z91.041 RADIOGRAPHIC DYE ALLERGY STATUS: ICD-10-CM

## 2017-11-22 LAB
CULTURE RESULTS: SIGNIFICANT CHANGE UP
CULTURE RESULTS: SIGNIFICANT CHANGE UP
SPECIMEN SOURCE: SIGNIFICANT CHANGE UP
SPECIMEN SOURCE: SIGNIFICANT CHANGE UP

## 2018-02-05 ENCOUNTER — APPOINTMENT (OUTPATIENT)
Dept: GASTROENTEROLOGY | Facility: CLINIC | Age: 83
End: 2018-02-05

## 2018-05-10 ENCOUNTER — RX RENEWAL (OUTPATIENT)
Age: 83
End: 2018-05-10

## 2019-05-20 ENCOUNTER — OTHER (OUTPATIENT)
Age: 84
End: 2019-05-20

## 2019-05-30 ENCOUNTER — APPOINTMENT (OUTPATIENT)
Dept: ELECTROPHYSIOLOGY | Facility: CLINIC | Age: 84
End: 2019-05-30
Payer: MEDICARE

## 2019-05-30 VITALS
WEIGHT: 142.13 LBS | DIASTOLIC BLOOD PRESSURE: 79 MMHG | SYSTOLIC BLOOD PRESSURE: 132 MMHG | HEIGHT: 64 IN | BODY MASS INDEX: 24.27 KG/M2 | OXYGEN SATURATION: 97 % | HEART RATE: 61 BPM

## 2019-05-30 PROCEDURE — 93000 ELECTROCARDIOGRAM COMPLETE: CPT

## 2019-05-30 PROCEDURE — 99204 OFFICE O/P NEW MOD 45 MIN: CPT

## 2019-05-30 RX ORDER — PREDNISONE 5 MG/1
5 TABLET ORAL
Refills: 0 | Status: ACTIVE | COMMUNITY
Start: 2019-05-30

## 2019-05-30 RX ORDER — METFORMIN ER 500 MG 500 MG/1
500 TABLET ORAL DAILY
Qty: 30 | Refills: 0 | Status: ACTIVE | COMMUNITY
Start: 2019-05-30

## 2019-05-30 NOTE — PHYSICAL EXAM
[General Appearance - Well Developed] : well developed [Normal Appearance] : normal appearance [Well Groomed] : well groomed [General Appearance - Well Nourished] : well nourished [No Deformities] : no deformities [General Appearance - In No Acute Distress] : no acute distress [Normal Conjunctiva] : the conjunctiva exhibited no abnormalities [Eyelids - No Xanthelasma] : the eyelids demonstrated no xanthelasmas [Normal Oral Mucosa] : normal oral mucosa [No Oral Pallor] : no oral pallor [No Oral Cyanosis] : no oral cyanosis [Normal Jugular Venous A Waves Present] : normal jugular venous A waves present [Normal Jugular Venous V Waves Present] : normal jugular venous V waves present [No Jugular Venous Triplett A Waves] : no jugular venous triplett A waves [Heart Rate And Rhythm] : heart rate and rhythm were normal [Heart Sounds] : normal S1 and S2 [Murmurs] : no murmurs present [Respiration, Rhythm And Depth] : normal respiratory rhythm and effort [Exaggerated Use Of Accessory Muscles For Inspiration] : no accessory muscle use [Auscultation Breath Sounds / Voice Sounds] : lungs were clear to auscultation bilaterally [Abdomen Soft] : soft [Abdomen Tenderness] : non-tender [Abdomen Mass (___ Cm)] : no abdominal mass palpated [Abnormal Walk] : normal gait [Gait - Sufficient For Exercise Testing] : the gait was sufficient for exercise testing [Nail Clubbing] : no clubbing of the fingernails [Cyanosis, Localized] : no localized cyanosis [Petechial Hemorrhages (___cm)] : no petechial hemorrhages [Skin Color & Pigmentation] : normal skin color and pigmentation [] : no rash [No Venous Stasis] : no venous stasis [Skin Lesions] : no skin lesions [No Skin Ulcers] : no skin ulcer [No Xanthoma] : no  xanthoma was observed [Oriented To Time, Place, And Person] : oriented to person, place, and time [Affect] : the affect was normal [Mood] : the mood was normal [No Anxiety] : not feeling anxious

## 2019-06-04 NOTE — DISCUSSION/SUMMARY
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 \par \par Impression:\par \par 1. CVA: Recommend undergoing placement of ILR for long term monitoring of suspected atrial fibrillation in setting of CVA c/w Crystal AF study. Risks, benefits, and alternatives to procedure discussed.\par \par 2. HTN: resume oral antihypertensives as prescribed. Encouraged heart healthy diet, sodium restriction, and weight loss. Continue regular f/u with Cardiologist for further HTN management.\par \par 3. HLD: resume statin therapy as prescribed and regular f/u with Cardiologist for routine lipid monitoring and management.\par \par Will plan for ILR placement. \par

## 2019-06-04 NOTE — HISTORY OF PRESENT ILLNESS
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 Admits doing well but has suffered from recurring CVA and TIAs. Follows closely with Neurology. Denies chest pain, palpitations, SOB, syncope or near syncope.

## 2019-06-07 ENCOUNTER — OUTPATIENT (OUTPATIENT)
Dept: OUTPATIENT SERVICES | Facility: HOSPITAL | Age: 84
LOS: 1 days | Discharge: HOME | End: 2019-06-07
Payer: MEDICARE

## 2019-06-07 DIAGNOSIS — Z98.89 OTHER SPECIFIED POSTPROCEDURAL STATES: Chronic | ICD-10-CM

## 2019-06-07 DIAGNOSIS — Z86.73 PERSONAL HISTORY OF TRANSIENT ISCHEMIC ATTACK (TIA), AND CEREBRAL INFARCTION WITHOUT RESIDUAL DEFICITS: ICD-10-CM

## 2019-06-07 DIAGNOSIS — Z90.49 ACQUIRED ABSENCE OF OTHER SPECIFIED PARTS OF DIGESTIVE TRACT: Chronic | ICD-10-CM

## 2019-06-07 LAB — GLUCOSE BLDC GLUCOMTR-MCNC: 117 MG/DL — HIGH (ref 70–99)

## 2019-06-07 PROCEDURE — 33285 INSJ SUBQ CAR RHYTHM MNTR: CPT

## 2019-06-07 RX ORDER — SODIUM CHLORIDE 9 MG/ML
3 INJECTION INTRAMUSCULAR; INTRAVENOUS; SUBCUTANEOUS EVERY 8 HOURS
Refills: 0 | Status: DISCONTINUED | OUTPATIENT
Start: 2019-06-07 | End: 2019-06-22

## 2019-06-07 RX ORDER — LINACLOTIDE 145 UG/1
1 CAPSULE, GELATIN COATED ORAL
Qty: 0 | Refills: 0 | DISCHARGE

## 2019-06-07 NOTE — H&P CARDIOLOGY - HISTORY OF PRESENT ILLNESS
83 y/o F w/ PMH of HTN, HLD, DM, sarcoidosis of lung, CKD with glomerulonephritis and recurrent TIA/CVAs presents for Loop recorder implantation. See hard copy H&P from 5/30/19 in chart

## 2019-06-07 NOTE — H&P CARDIOLOGY - PMH
Anemia    ARF (acute renal failure)  with glomerulonephritis  CVA (cerebral vascular accident)    Ectopic fetus    Hernia  as a child  HLD (hyperlipidemia)    HTN (hypertension)    Mass  calcified mass left hip area, 2011  Murmur    Sarcoidosis of lung    TIA (transient ischemic attack)

## 2019-06-07 NOTE — CHART NOTE - NSCHARTNOTEFT_GEN_A_CORE
Patient is s/p ILR implantation. Device teaching given to patient and she demonstrates understanding of the instructions. F/U appointmnet in device clinic on 6/24/19

## 2019-06-07 NOTE — H&P CARDIOLOGY - RS GEN PE MLT RESP DETAILS PC
clear to auscultation bilaterally/respirations non-labored/no chest wall tenderness/breath sounds equal/good air movement/airway patent

## 2019-06-24 ENCOUNTER — APPOINTMENT (OUTPATIENT)
Dept: ELECTROPHYSIOLOGY | Facility: CLINIC | Age: 84
End: 2019-06-24

## 2019-07-10 PROBLEM — I63.9 CEREBRAL INFARCTION, UNSPECIFIED: Chronic | Status: ACTIVE | Noted: 2019-06-07

## 2019-07-10 PROBLEM — G45.9 TRANSIENT CEREBRAL ISCHEMIC ATTACK, UNSPECIFIED: Chronic | Status: ACTIVE | Noted: 2019-06-07

## 2019-07-30 ENCOUNTER — APPOINTMENT (OUTPATIENT)
Dept: ELECTROPHYSIOLOGY | Facility: CLINIC | Age: 84
End: 2019-07-30
Payer: MEDICARE

## 2019-07-30 DIAGNOSIS — Z86.73 PERSONAL HISTORY OF TRANSIENT ISCHEMIC ATTACK (TIA), AND CEREBRAL INFARCTION W/OUT RESIDUAL DEFICITS: ICD-10-CM

## 2019-07-30 PROCEDURE — 93285 PRGRMG DEV EVAL SCRMS IP: CPT

## 2019-07-30 RX ORDER — TRAMADOL HYDROCHLORIDE 50 MG/1
50 TABLET, COATED ORAL
Refills: 0 | Status: ACTIVE | COMMUNITY

## 2021-04-15 ENCOUNTER — INPATIENT (INPATIENT)
Facility: HOSPITAL | Age: 86
LOS: 3 days | Discharge: HOME | DRG: 204 | End: 2021-04-19
Attending: INTERNAL MEDICINE | Admitting: INTERNAL MEDICINE
Payer: MEDICARE

## 2021-04-15 VITALS
RESPIRATION RATE: 18 BRPM | HEART RATE: 65 BPM | SYSTOLIC BLOOD PRESSURE: 150 MMHG | DIASTOLIC BLOOD PRESSURE: 83 MMHG | TEMPERATURE: 98 F | WEIGHT: 138.01 LBS | OXYGEN SATURATION: 96 % | HEIGHT: 60.4 IN

## 2021-04-15 DIAGNOSIS — Z90.49 ACQUIRED ABSENCE OF OTHER SPECIFIED PARTS OF DIGESTIVE TRACT: Chronic | ICD-10-CM

## 2021-04-15 DIAGNOSIS — Z98.89 OTHER SPECIFIED POSTPROCEDURAL STATES: Chronic | ICD-10-CM

## 2021-04-15 LAB
ALBUMIN SERPL ELPH-MCNC: 4.3 G/DL — SIGNIFICANT CHANGE UP (ref 3.3–5)
ALP SERPL-CCNC: 72 U/L — SIGNIFICANT CHANGE UP (ref 40–120)
ALT FLD-CCNC: 19 U/L — SIGNIFICANT CHANGE UP (ref 10–45)
ANION GAP SERPL CALC-SCNC: 9 MMOL/L — SIGNIFICANT CHANGE UP (ref 5–17)
AST SERPL-CCNC: 34 U/L — SIGNIFICANT CHANGE UP (ref 10–40)
BASOPHILS # BLD AUTO: 0.03 K/UL — SIGNIFICANT CHANGE UP (ref 0–0.2)
BASOPHILS NFR BLD AUTO: 0.4 % — SIGNIFICANT CHANGE UP (ref 0–2)
BILIRUB SERPL-MCNC: 0.2 MG/DL — SIGNIFICANT CHANGE UP (ref 0.2–1.2)
BUN SERPL-MCNC: 31 MG/DL — HIGH (ref 7–23)
CALCIUM SERPL-MCNC: 10 MG/DL — SIGNIFICANT CHANGE UP (ref 8.4–10.5)
CHLORIDE SERPL-SCNC: 99 MMOL/L — SIGNIFICANT CHANGE UP (ref 96–108)
CO2 SERPL-SCNC: 29 MMOL/L — SIGNIFICANT CHANGE UP (ref 22–31)
CREAT SERPL-MCNC: 1.14 MG/DL — SIGNIFICANT CHANGE UP (ref 0.5–1.3)
EOSINOPHIL # BLD AUTO: 0.3 K/UL — SIGNIFICANT CHANGE UP (ref 0–0.5)
EOSINOPHIL NFR BLD AUTO: 3.9 % — SIGNIFICANT CHANGE UP (ref 0–6)
GLUCOSE SERPL-MCNC: 102 MG/DL — HIGH (ref 70–99)
HCT VFR BLD CALC: 36 % — SIGNIFICANT CHANGE UP (ref 34.5–45)
HGB BLD-MCNC: 11.2 G/DL — LOW (ref 11.5–15.5)
IMM GRANULOCYTES NFR BLD AUTO: 0.3 % — SIGNIFICANT CHANGE UP (ref 0–1.5)
LYMPHOCYTES # BLD AUTO: 0.92 K/UL — LOW (ref 1–3.3)
LYMPHOCYTES # BLD AUTO: 11.9 % — LOW (ref 13–44)
MCHC RBC-ENTMCNC: 27.2 PG — SIGNIFICANT CHANGE UP (ref 27–34)
MCHC RBC-ENTMCNC: 31.1 GM/DL — LOW (ref 32–36)
MCV RBC AUTO: 87.4 FL — SIGNIFICANT CHANGE UP (ref 80–100)
MONOCYTES # BLD AUTO: 0.57 K/UL — SIGNIFICANT CHANGE UP (ref 0–0.9)
MONOCYTES NFR BLD AUTO: 7.4 % — SIGNIFICANT CHANGE UP (ref 2–14)
NEUTROPHILS # BLD AUTO: 5.88 K/UL — SIGNIFICANT CHANGE UP (ref 1.8–7.4)
NEUTROPHILS NFR BLD AUTO: 76.1 % — SIGNIFICANT CHANGE UP (ref 43–77)
NRBC # BLD: 0 /100 WBCS — SIGNIFICANT CHANGE UP (ref 0–0)
PLATELET # BLD AUTO: 227 K/UL — SIGNIFICANT CHANGE UP (ref 150–400)
POTASSIUM SERPL-MCNC: 3.8 MMOL/L — SIGNIFICANT CHANGE UP (ref 3.5–5.3)
POTASSIUM SERPL-SCNC: 3.8 MMOL/L — SIGNIFICANT CHANGE UP (ref 3.5–5.3)
PROT SERPL-MCNC: 7.6 G/DL — SIGNIFICANT CHANGE UP (ref 6–8.3)
RBC # BLD: 4.12 M/UL — SIGNIFICANT CHANGE UP (ref 3.8–5.2)
RBC # FLD: 15 % — HIGH (ref 10.3–14.5)
SODIUM SERPL-SCNC: 137 MMOL/L — SIGNIFICANT CHANGE UP (ref 135–145)
WBC # BLD: 7.72 K/UL — SIGNIFICANT CHANGE UP (ref 3.8–10.5)
WBC # FLD AUTO: 7.72 K/UL — SIGNIFICANT CHANGE UP (ref 3.8–10.5)

## 2021-04-15 PROCEDURE — 71250 CT THORAX DX C-: CPT | Mod: 26,MA

## 2021-04-15 PROCEDURE — 99285 EMERGENCY DEPT VISIT HI MDM: CPT | Mod: GC

## 2021-04-15 PROCEDURE — 71046 X-RAY EXAM CHEST 2 VIEWS: CPT | Mod: 26

## 2021-04-15 NOTE — ED PROVIDER NOTE - PROGRESS NOTE DETAILS
Virgilio, PGY2: spoke with Dr. Humphries, he says to admit pt to Dr. Keagan Marino, pt to get CTA chest dt concern for PE Virgilio, PGY2: spoke with Dr. Humphries, he says to admit pt to Dr. Keagan Marino, pt to get CT chest for abnormal cxr Virgilio, PGY2: paged dr. base, will await callback

## 2021-04-15 NOTE — ED PROVIDER NOTE - CLINICAL SUMMARY MEDICAL DECISION MAKING FREE TEXT BOX
Attending MD Monsalve: 86 year old F with pmhx of sarcoidosis of lung, kidney failure, on lasix, CVA, TIA, HTN presenting with dyspnea and intermittent dizziness. Dizziness is described as episodic/provoked and normal neuro exam so do not suspect central vertigo, likely peripheral. Regarding dyspnea, CXR with b/l patchy opacities, ?pulmonary sarcoid, PNA vs. edema. will obtain CTA to further assess but will need admission for further work up      *The above represents an initial assessment/impression. Please refer to progress notes for potential changes in patient clinical course*

## 2021-04-15 NOTE — ED PROVIDER NOTE - RAPID ASSESSMENT
86 year old F with pmhx of sarcoidosis of lung, kidney failure on lasix. CVA, TIA, HTN and pshx of appendectomy, hysterectomy presents to ED as referral from cardiologist s/o SOB and room spinning x1 month. Feels SOB even when at rest and feels as if she is wheezing. Pt states that when she's felt SOB she's used inhaler in the past but has not used it for this current episode. Reports she has been tolerating PO well. No prior hx of vertigo. Denies CP, leg swelling, fever, nausea, and vomiting. Cards: Aramis Humphries     Patient was seen as a tele QDOC patient. The patient will be seen and further worked up in the main emergency department and their care will be completed by the main emergency department team along with a thorough physical exam. Receiving team will follow up on labs, analgesia, any clinical imaging, reassess and disposition as clinically indicated, all decisions regarding the progression of care will be made at their discretion.    Scribe Statement: I, Renetta Watts, attest that this documentation has been prepared under the direction and in the presence of Sierra Goldberg) 86 year old F with pmhx of sarcoidosis of lung, kidney failure, on lasix, CVA, TIA, HTN and pshx of appendectomy, hysterectomy presents to ED as referral from cardiologist s/o SOB and room spinning x1 month. Feels SOB even when at rest and feels as if she is wheezing. Pt states that when she's felt SOB she's used inhaler in the past but has not used it for this current episode. Reports she has been tolerating PO well. No prior hx of vertigo. Denies CP, leg swelling, fever, nausea, and vomiting. Cards: Aramis Humphries     Patient was seen as a tele QDOC patient. The patient will be seen and further worked up in the main emergency department and their care will be completed by the main emergency department team along with a thorough physical exam. Receiving team will follow up on labs, analgesia, any clinical imaging, reassess and disposition as clinically indicated, all decisions regarding the progression of care will be made at their discretion.    Scribe Statement: I, Renetta Watts, attest that this documentation has been prepared under the direction and in the presence of Sierra Goldberg (PA)    Sierra CABRAL PA-C, personally performed the service described in the documentation recorded by the scribe in my presence, and it accurately and completely records my words and actions.

## 2021-04-15 NOTE — ED PROVIDER NOTE - PHYSICAL EXAMINATION
GENERAL: no acute distress, non-toxic appearing  HEENT: normal conjunctiva, oral mucosa moist  CARDIAC: regular rate and regular rhythm  PULM: no appreciable abnormal lung sounds, sats well on RA, no incr wob  GI: abdomen nondistended, soft, nontender  : no suprapubic tenderness  NEURO: alert and oriented x 3, normal speech, ZENAIDA, EOMI without nystagmus, CN's intact, moving all extremities without lateralization  MSK: no visible deformities, no peripheral edema, calf tenderness/redness/swelling  SKIN: no visible rashes  PSYCH: appropriate mood and affect

## 2021-04-15 NOTE — ED ADULT NURSE NOTE - OBJECTIVE STATEMENT
Patient is a 86y female presenting to the ED ambulatory from home with c/o dizziness. Patient A&Ox4. Patient reports intermittent dizziness for the past month accompanied by fatigue, and dyspnea upon exertion. Reports a "fullness" feeling in her chest and shortness of breath that worsens with activity. Reports 1 episode of diarrhea today. ECG performed at bedside. Patient placed on cardiac monitor. Abdomen is soft, non-distended and non-tender upon palpation. Denies any chest pain, nausea, vomiting, abdominal pain, burning upon urination or difficulty urinating. Patient is a 86y female presenting to the ED ambulatory from home with c/o dizziness. Patient A&Ox4. Patient is speaking coherently in full sentences. Patient reports intermittent dizziness for the past month accompanied by fatigue, and dyspnea upon exertion. Reports a "fullness" feeling in her chest and shortness of breath that worsens with activity. Reports 1 episode of diarrhea today. ECG performed at bedside. Patient placed on cardiac monitor. Displays equal strength and movement of bilateral upper and lower extremities. Respirations are even and non-labored. Abdomen is soft, non-distended and non-tender upon palpation. Denies any chest pain, nausea, vomiting, abdominal pain, burning upon urination or difficulty urinating.

## 2021-04-15 NOTE — ED PROVIDER NOTE - CARE PLAN
Principal Discharge DX:	Chest pain  Secondary Diagnosis:	Shortness of breath  Secondary Diagnosis:	Lightheadedness

## 2021-04-15 NOTE — ED PROVIDER NOTE - ATTENDING CONTRIBUTION TO CARE
Attending MD Monsalve:  I personally have seen and examined this patient.  Resident note reviewed and agree on plan of care and except where noted.  See HPI, PE, and MDM for details.

## 2021-04-15 NOTE — ED ADULT NURSE NOTE - NSIMPLEMENTINTERV_GEN_ALL_ED
Implemented All Universal Safety Interventions:  [place] to call system. Call bell, personal items and telephone within reach. Instruct patient to call for assistance. Room bathroom lighting operational. Non-slip footwear when patient is off stretcher. Physically safe environment: no spills, clutter or unnecessary equipment. Stretcher in lowest position, wheels locked, appropriate side rails in place.

## 2021-04-16 DIAGNOSIS — D86.9 SARCOIDOSIS, UNSPECIFIED: ICD-10-CM

## 2021-04-16 DIAGNOSIS — R06.02 SHORTNESS OF BREATH: ICD-10-CM

## 2021-04-16 DIAGNOSIS — R07.9 CHEST PAIN, UNSPECIFIED: ICD-10-CM

## 2021-04-16 DIAGNOSIS — R42 DIZZINESS AND GIDDINESS: ICD-10-CM

## 2021-04-16 LAB
ANION GAP SERPL CALC-SCNC: 10 MMOL/L — SIGNIFICANT CHANGE UP (ref 5–17)
BUN SERPL-MCNC: 23 MG/DL — SIGNIFICANT CHANGE UP (ref 7–23)
CALCIUM SERPL-MCNC: 9.8 MG/DL — SIGNIFICANT CHANGE UP (ref 8.4–10.5)
CHLORIDE SERPL-SCNC: 102 MMOL/L — SIGNIFICANT CHANGE UP (ref 96–108)
CO2 SERPL-SCNC: 29 MMOL/L — SIGNIFICANT CHANGE UP (ref 22–31)
CREAT SERPL-MCNC: 0.93 MG/DL — SIGNIFICANT CHANGE UP (ref 0.5–1.3)
GLUCOSE BLDC GLUCOMTR-MCNC: 122 MG/DL — HIGH (ref 70–99)
GLUCOSE BLDC GLUCOMTR-MCNC: 145 MG/DL — HIGH (ref 70–99)
GLUCOSE BLDC GLUCOMTR-MCNC: 96 MG/DL — SIGNIFICANT CHANGE UP (ref 70–99)
GLUCOSE SERPL-MCNC: 87 MG/DL — SIGNIFICANT CHANGE UP (ref 70–99)
HCT VFR BLD CALC: 37.1 % — SIGNIFICANT CHANGE UP (ref 34.5–45)
HGB BLD-MCNC: 11.4 G/DL — LOW (ref 11.5–15.5)
MCHC RBC-ENTMCNC: 26.9 PG — LOW (ref 27–34)
MCHC RBC-ENTMCNC: 30.7 GM/DL — LOW (ref 32–36)
MCV RBC AUTO: 87.5 FL — SIGNIFICANT CHANGE UP (ref 80–100)
NRBC # BLD: 0 /100 WBCS — SIGNIFICANT CHANGE UP (ref 0–0)
PLATELET # BLD AUTO: 221 K/UL — SIGNIFICANT CHANGE UP (ref 150–400)
POTASSIUM SERPL-MCNC: 3.5 MMOL/L — SIGNIFICANT CHANGE UP (ref 3.5–5.3)
POTASSIUM SERPL-SCNC: 3.5 MMOL/L — SIGNIFICANT CHANGE UP (ref 3.5–5.3)
RBC # BLD: 4.24 M/UL — SIGNIFICANT CHANGE UP (ref 3.8–5.2)
RBC # FLD: 15 % — HIGH (ref 10.3–14.5)
SARS-COV-2 RNA SPEC QL NAA+PROBE: SIGNIFICANT CHANGE UP
SODIUM SERPL-SCNC: 141 MMOL/L — SIGNIFICANT CHANGE UP (ref 135–145)
TROPONIN T, HIGH SENSITIVITY RESULT: 12 NG/L — SIGNIFICANT CHANGE UP (ref 0–51)
WBC # BLD: 6.13 K/UL — SIGNIFICANT CHANGE UP (ref 3.8–10.5)
WBC # FLD AUTO: 6.13 K/UL — SIGNIFICANT CHANGE UP (ref 3.8–10.5)

## 2021-04-16 PROCEDURE — 70450 CT HEAD/BRAIN W/O DYE: CPT | Mod: 26

## 2021-04-16 PROCEDURE — 93306 TTE W/DOPPLER COMPLETE: CPT | Mod: 26

## 2021-04-16 RX ORDER — METOPROLOL TARTRATE 50 MG
50 TABLET ORAL DAILY
Refills: 0 | Status: DISCONTINUED | OUTPATIENT
Start: 2021-04-16 | End: 2021-04-19

## 2021-04-16 RX ORDER — ASPIRIN/CALCIUM CARB/MAGNESIUM 324 MG
81 TABLET ORAL DAILY
Refills: 0 | Status: DISCONTINUED | OUTPATIENT
Start: 2021-04-16 | End: 2021-04-19

## 2021-04-16 RX ORDER — DEXTROSE 50 % IN WATER 50 %
25 SYRINGE (ML) INTRAVENOUS ONCE
Refills: 0 | Status: DISCONTINUED | OUTPATIENT
Start: 2021-04-16 | End: 2021-04-19

## 2021-04-16 RX ORDER — TRAMADOL HYDROCHLORIDE 50 MG/1
1 TABLET ORAL
Qty: 0 | Refills: 0 | DISCHARGE

## 2021-04-16 RX ORDER — GLUCAGON INJECTION, SOLUTION 0.5 MG/.1ML
1 INJECTION, SOLUTION SUBCUTANEOUS ONCE
Refills: 0 | Status: DISCONTINUED | OUTPATIENT
Start: 2021-04-16 | End: 2021-04-19

## 2021-04-16 RX ORDER — DEXTROSE 50 % IN WATER 50 %
15 SYRINGE (ML) INTRAVENOUS ONCE
Refills: 0 | Status: DISCONTINUED | OUTPATIENT
Start: 2021-04-16 | End: 2021-04-19

## 2021-04-16 RX ORDER — SODIUM CHLORIDE 9 MG/ML
1000 INJECTION, SOLUTION INTRAVENOUS
Refills: 0 | Status: DISCONTINUED | OUTPATIENT
Start: 2021-04-16 | End: 2021-04-19

## 2021-04-16 RX ORDER — INSULIN LISPRO 100/ML
VIAL (ML) SUBCUTANEOUS
Refills: 0 | Status: DISCONTINUED | OUTPATIENT
Start: 2021-04-16 | End: 2021-04-19

## 2021-04-16 RX ORDER — ATORVASTATIN CALCIUM 80 MG/1
20 TABLET, FILM COATED ORAL AT BEDTIME
Refills: 0 | Status: DISCONTINUED | OUTPATIENT
Start: 2021-04-16 | End: 2021-04-18

## 2021-04-16 RX ORDER — PANTOPRAZOLE SODIUM 20 MG/1
40 TABLET, DELAYED RELEASE ORAL
Refills: 0 | Status: DISCONTINUED | OUTPATIENT
Start: 2021-04-16 | End: 2021-04-19

## 2021-04-16 RX ORDER — DEXTROSE 50 % IN WATER 50 %
12.5 SYRINGE (ML) INTRAVENOUS ONCE
Refills: 0 | Status: DISCONTINUED | OUTPATIENT
Start: 2021-04-16 | End: 2021-04-19

## 2021-04-16 RX ORDER — ZOLPIDEM TARTRATE 10 MG/1
5 TABLET ORAL AT BEDTIME
Refills: 0 | Status: DISCONTINUED | OUTPATIENT
Start: 2021-04-16 | End: 2021-04-19

## 2021-04-16 RX ORDER — VALSARTAN 80 MG/1
80 TABLET ORAL DAILY
Refills: 0 | Status: DISCONTINUED | OUTPATIENT
Start: 2021-04-16 | End: 2021-04-19

## 2021-04-16 RX ORDER — ACETAMINOPHEN 500 MG
650 TABLET ORAL EVERY 6 HOURS
Refills: 0 | Status: DISCONTINUED | OUTPATIENT
Start: 2021-04-16 | End: 2021-04-19

## 2021-04-16 RX ORDER — HEPARIN SODIUM 5000 [USP'U]/ML
5000 INJECTION INTRAVENOUS; SUBCUTANEOUS EVERY 12 HOURS
Refills: 0 | Status: DISCONTINUED | OUTPATIENT
Start: 2021-04-16 | End: 2021-04-19

## 2021-04-16 RX ORDER — ASPIRIN/CALCIUM CARB/MAGNESIUM 324 MG
1 TABLET ORAL
Qty: 0 | Refills: 0 | DISCHARGE

## 2021-04-16 RX ADMIN — Medication 81 MILLIGRAM(S): at 13:38

## 2021-04-16 RX ADMIN — Medication 650 MILLIGRAM(S): at 13:37

## 2021-04-16 RX ADMIN — Medication 2.5 MILLIGRAM(S): at 13:38

## 2021-04-16 RX ADMIN — ATORVASTATIN CALCIUM 20 MILLIGRAM(S): 80 TABLET, FILM COATED ORAL at 21:19

## 2021-04-16 RX ADMIN — HEPARIN SODIUM 5000 UNIT(S): 5000 INJECTION INTRAVENOUS; SUBCUTANEOUS at 18:38

## 2021-04-16 RX ADMIN — VALSARTAN 80 MILLIGRAM(S): 80 TABLET ORAL at 13:38

## 2021-04-16 RX ADMIN — Medication 20 MILLIGRAM(S): at 13:38

## 2021-04-16 NOTE — H&P ADULT - ASSESSMENT
Dizziness  rule out orthostatic hypotension   monitor on tele   Echo   Neurology eval rule out CVA    HTN  adjust prn     Sarcoidosis   pulm eval    CVA  s/p ILR   no events     dm  hold metformin    dvt proph    gi proph   fsg riss

## 2021-04-16 NOTE — CONSULT NOTE ADULT - PROBLEM SELECTOR RECOMMENDATION 2
CT chest grossly unchanged from prior CT chest 2008  -States on prednisone 5mg PO qd for joint issues? -Not exacerbated  -CT chest grossly unchanged from prior CT chest 2008  -States on prednisone 5mg PO qd for joint issues?

## 2021-04-16 NOTE — H&P ADULT - NSICDXPASTMEDICALHX_GEN_ALL_CORE_FT
PAST MEDICAL HISTORY:  Anemia     ARF (acute renal failure) with glomerulonephritis    CVA (cerebral vascular accident)     Ectopic fetus     Hernia as a child    HLD (hyperlipidemia)     HTN (hypertension)     Mass calcified mass left hip area, 2011    Murmur     Sarcoidosis of lung     TIA (transient ischemic attack)

## 2021-04-16 NOTE — H&P ADULT - NSICDXFAMILYHX_GEN_ALL_CORE_FT
FAMILY HISTORY:  Sibling  Still living? Yes, Estimated age: Age Unknown  Type 2 diabetes mellitus, Age at diagnosis: Age Unknown

## 2021-04-16 NOTE — CONSULT NOTE ADULT - ASSESSMENT
85 y/o F with PMH of sarcoidosis of lung (on prednisone qd for joints?), CKD, CVA, TIA, HTN, appendectomy, hyster. Presents to ED with c/o SOB, chest discomfort substernally, lightheadedness, worsening over the past few days. CT chest grossly unchanged from 2008. Currently with no c/o SOB - breathing comfortably on RA sats 98%.

## 2021-04-16 NOTE — H&P ADULT - HISTORY OF PRESENT ILLNESS
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   Denies any fevers/chills, uri sxs, cough, abd pain, n/v, dark/bloody stools, urinary sxs, leg swelling  . Chest discomfort not radiating to arms/shoulders/back, not exertional, not tearing in quality

## 2021-04-16 NOTE — CONSULT NOTE ADULT - ASSESSMENT
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  states worse sensation when standing and walking feels fine sitting down . she has ILR   Orthostatics obtained. HR goes from 62-->98 upon standing ?POTS, takes ambien at home, TTE neg   Etiology of Lightheadedness more cardiac in nature r/o infarct but lower suspicion  - c/w ASA 81mg daily. lipitor 20 for now  - check A1c and lipids  - CTA H/N  - MRI brain w/o   - cardiac workup in progress   - limit sedating meds, gets ambien at night.   - spoke with neurologist Dr. Bala Snow, does not come here  - telemetry  - PT/OT, OOBC  - check FS, glucose control <180  - GI/DVT ppx  - Counseling on diet, exercise, and medication adherence was done  - Counseling on smoking cessation and alcohol consumption offered when appropriate.  - Pain assessed and judicious use of narcotics when appropriate was discussed.    - Stroke education given when appropriate.  - Importance of fall prevention discussed.   - Differential diagnosis and plan of care discussed with patient and/or family and primary team  - Thank you for allowing me to participate in the care of this patient. Call with questions.   Diogenes Boyd MD  Vascular Neurology  Office: [phone]

## 2021-04-16 NOTE — CONSULT NOTE ADULT - PROBLEM SELECTOR RECOMMENDATION 9
unclear cause - no c/o SOB at this time  -Normoxic  -Doubt underlying sarcoidosis exacerbated at this time

## 2021-04-16 NOTE — H&P ADULT - NSICDXPASTSURGICALHX_GEN_ALL_CORE_FT
PAST SURGICAL HISTORY:  H/O cataract extraction, right with laser revision 3/2016    S/P appendectomy     S/P cholecystectomy 11/2015    S/P hysterectomy with BLSO    S/P knee surgery for right torn meniscual

## 2021-04-16 NOTE — CONSULT NOTE ADULT - SUBJECTIVE AND OBJECTIVE BOX
CHIEF COMPLAINT:Patient is a 86y old  Female who presents with a chief complaint of     HISTORY OF PRESENT ILLNESS:    86 female with history as below   known to me from office   has been having dizziness   described as unsteady gait , lightheaded sensation upon standing and walking  no cp   no sob  no dizziness     PAST MEDICAL & SURGICAL HISTORY:  HTN (hypertension)    ARF (acute renal failure)  with glomerulonephritis    Anemia    Ectopic fetus    Sarcoidosis of lung    HLD (hyperlipidemia)    Hernia  as a child    Mass  calcified mass left hip area, 2011    Murmur    CVA (cerebral vascular accident)    TIA (transient ischemic attack)    S/P appendectomy    S/P hysterectomy  with BLSO    S/P knee surgery  for right torn meniscual    S/P cholecystectomy  11/2015    H/O cataract extraction, right  with laser revision 3/2016            MEDICATIONS:                  FAMILY HISTORY:  Type 2 diabetes mellitus (Sibling)        Non-contributory    SOCIAL HISTORY:    No tobacco, drugs or etoh    Allergies    codeine (Pruritus)  iodine (Hives)  morphine (Pruritus)  shellfish (Hives)  sulfa drugs (Hives)    Intolerances    	    REVIEW OF SYSTEMS:  as above  The rest of the 14 points ROS reviewed and except above they are unremarkable.        PHYSICAL EXAM:  T(C): 36.8 (04-16-21 @ 05:03), Max: 36.8 (04-15-21 @ 19:21)  HR: 58 (04-16-21 @ 05:03) (58 - 65)  BP: 154/94 (04-16-21 @ 05:03) (150/83 - 159/76)  RR: 18 (04-16-21 @ 05:03) (16 - 18)  SpO2: 97% (04-16-21 @ 05:03) (96% - 100%)  Wt(kg): --  I&O's Summary      JVP: Normal  Neck: supple  Lung: clear   CV: S1 S2 , Murmur:  Abd: soft  Ext: No edema  neuro: Awake / alert  Psych: flat affect  Skin: normal      LABS/DATA:    TELEMETRY: 	    ECG:  	   	  CARDIAC MARKERS:                        12 <<== 04-15-21 @ 21:21                              11.2   7.72  )-----------( 227      ( 15 Apr 2021 21:21 )             36.0     04-15    137  |  99  |  31<H>  ----------------------------<  102<H>  3.8   |  29  |  1.14    Ca    10.0      15 Apr 2021 21:21    TPro  7.6  /  Alb  4.3  /  TBili  0.2  /  DBili  x   /  AST  34  /  ALT  19  /  AlkPhos  72  04-15    proBNP: Serum Pro-Brain Natriuretic Peptide: 210 pg/mL (04-15 @ 21:21)    Lipid Profile:   HgA1c:   TSH:

## 2021-04-16 NOTE — CHART NOTE - NSCHARTNOTEFT_GEN_A_CORE
Medicine Np note    Call received from Radiology concerning patient's hx of allergy to IV contrast and patient scheduled for CT head and neck with iv  Patient is a new admission from ED. Medicine Np note    Call received from Radiology concerning patient's hx of allergy to IV contrast and patient scheduled for CT head and neck with iv contrast due to dizziness/R/O infarct, ORDERED BY NEUROLOGY.  Patient is a new admission from ED.  Evaluated the patient at bedside. Patient a&ox3, patient reports hx of IV contrast allergy 8 years ago with systemic hives and warm feeling all over body.  Discussed with neurology, recommended to discontinue CT head and neck with contrast and recommended MRA head and neck without contrast routine.   MRA H/N without contrast ordered per neurology recommendation  Discussed with patient, RN  Will continue to monitor    Dequan Butler [facility]  [id number]

## 2021-04-16 NOTE — CONSULT NOTE ADULT - SUBJECTIVE AND OBJECTIVE BOX
Neurology Consult    Reason for Consult: Patient is a 86y old  Female who presents with a chief complaint of dizziness     HPI:  86 year old F with pmhx of sarcoidosis of lung on steroids, ckd , on lasix, CVA, TIA, HTN and pshx of appendectomy, hysterectomy, presents to the ED with sob, chest discomfort substernally, lightheadedness for the past month progressing though not acutely over the past few days.   Denies any fevers/chills, uri sxs, cough, abd pain, n/v, dark/bloody stools, urinary sxs, leg swelling  . Chest discomfort not radiating to arms/shoulders/back, not exertional, not tearing in quality.   patient follows with Dr. Bala Snow. states worse sensation when standing and walking feels fine sitting down . she has ILR        PAST MEDICAL & SURGICAL HISTORY:  HTN (hypertension)    ARF (acute renal failure)  with glomerulonephritis    Anemia    Ectopic fetus    Sarcoidosis of lung    HLD (hyperlipidemia)    Hernia  as a child    Mass  calcified mass left hip area, 2011    Murmur    CVA (cerebral vascular accident)    TIA (transient ischemic attack)    S/P appendectomy    S/P hysterectomy  with BLSO    S/P knee surgery  for right torn meniscual    S/P cholecystectomy  11/2015    H/O cataract extraction, right  with laser revision 3/2016        Allergies: Allergies    codeine (Pruritus)  iodine (Hives)  morphine (Pruritus)  shellfish (Hives)  sulfa drugs (Hives)    Intolerances        Social History: Denies toxic habits including tobacco, ETOH or illicit drugs.    Family History: FAMILY HISTORY:  Type 2 diabetes mellitus (Sibling)    . No family history of strokes    Medications: MEDICATIONS  (STANDING):  aspirin enteric coated 81 milliGRAM(s) Oral daily  atorvastatin 20 milliGRAM(s) Oral at bedtime  dextrose 40% Gel 15 Gram(s) Oral once  dextrose 5%. 1000 milliLiter(s) (50 mL/Hr) IV Continuous <Continuous>  dextrose 5%. 1000 milliLiter(s) (100 mL/Hr) IV Continuous <Continuous>  dextrose 50% Injectable 25 Gram(s) IV Push once  dextrose 50% Injectable 12.5 Gram(s) IV Push once  dextrose 50% Injectable 25 Gram(s) IV Push once  glucagon  Injectable 1 milliGRAM(s) IntraMuscular once  heparin   Injectable 5000 Unit(s) SubCutaneous every 12 hours  insulin lispro (ADMELOG) corrective regimen sliding scale   SubCutaneous three times a day before meals  metoprolol succinate ER 50 milliGRAM(s) Oral daily  pantoprazole    Tablet 40 milliGRAM(s) Oral before breakfast  PARoxetine 20 milliGRAM(s) Oral daily  predniSONE   Tablet 2.5 milliGRAM(s) Oral daily  valsartan 80 milliGRAM(s) Oral daily    MEDICATIONS  (PRN):  acetaminophen   Tablet .. 650 milliGRAM(s) Oral every 6 hours PRN Mild Pain (1 - 3)  zolpidem 5 milliGRAM(s) Oral at bedtime PRN Insomnia      Review of Systems:  CONSTITUTIONAL:  No weight loss, fever, chills, weakness or fatigue.  HEENT:  Eyes:  No visual loss, blurred vision, double vision or yellow sclera. Ears, Nose, Throat:  No hearing loss, sneezing, congestion, runny nose or sore throat.  SKIN:  No rash or itching.  CARDIOVASCULAR:  No chest pain, chest pressure or chest discomfort. No palpitations or edema.  RESPIRATORY:  No shortness of breath, cough or sputum.  GASTROINTESTINAL:  No anorexia, nausea, vomiting or diarrhea. No abdominal pain or blood.  GENITOURINARY:  No burning on urination or incontinence   NEUROLOGICAL:  No headache, +dizziness, No syncope, paralysis, ataxia, numbness or tingling in the extremities. No change in bowel or bladder control. no limb weakness. no vision changes.   MUSCULOSKELETAL:  No muscle, back pain, joint pain or stiffness.  HEMATOLOGIC:  No anemia, bleeding or bruising.  LYMPHATICS:  No enlarged nodes. No history of splenectomy.  PSYCHIATRIC:  No history of depression or anxiety.  ENDOCRINOLOGIC:  No reports of sweating, cold or heat intolerance. No polyuria or polydipsia.      Vitals:  Vital Signs Last 24 Hrs  T(C): 36.7 (16 Apr 2021 16:13), Max: 37 (16 Apr 2021 08:49)  T(F): 98.1 (16 Apr 2021 16:13), Max: 98.6 (16 Apr 2021 08:49)  HR: 62 (16 Apr 2021 16:13) (58 - 65)  BP: 112/71 (16 Apr 2021 16:13) (112/71 - 159/76)  BP(mean): --  RR: 18 (16 Apr 2021 16:13) (16 - 19)  SpO2: 96% (16 Apr 2021 16:13) (95% - 100%)    Orthostatic VS    04-16-21 @ 12:52  Lying BP: 120/76 HR: 62   Sitting BP: 166/82 HR: 63  Standing BP: 155/83 HR: 98  Site: --   Mode: --      General Exam:   General Appearance: Appropriately dressed and in no acute distress       Head: Normocephalic, atraumatic and no dysmorphic features  Ear, Nose, and Throat: Moist mucous membranes  CVS: S1S2+  Resp: No SOB, no wheeze or rhonchi  GI: soft NT/ND  Extremities: No edema or cyanosis  Skin: No bruises or rashes     Neurological Exam:  Mental Status: Awake, alert and oriented x 3.  Able to follow simple and complex verbal commands. Able to name and repeat. fluent speech. No obvious aphasia or dysarthria noted.   Cranial Nerves: PERRL, EOMI, VFFC, sensation V1-V3 intact,  no obvious facial asymmetry, equal elevation of palate, scm/trap 5/5, tongue is midline on protrusion. no obvious papilledema on fundoscopic exam. hearing is grossly intact.   Motor: Normal bulk, tone and strength throughout. Fine finger movements were intact and symmetric. no tremors or drift noted.    Sensation: Intact to light touch and pinprick throughout. no right/left confusion. no extinction to tactile on DSS. Romberg was negative.   Reflexes: 1+ throughout at biceps, brachioradialis, triceps, patellars and ankles bilaterally and equal. No clonus. R toe and L toe were both downgoing.  Coordination: No dysmetria on FNF or HKS  Gait: able to ambulate but feels dizzy    Data/Labs/Imaging which I personally reviewed.     Labs:     CBC Full  -  ( 16 Apr 2021 06:55 )  WBC Count : 6.13 K/uL  RBC Count : 4.24 M/uL  Hemoglobin : 11.4 g/dL  Hematocrit : 37.1 %  Platelet Count - Automated : 221 K/uL  Mean Cell Volume : 87.5 fl  Mean Cell Hemoglobin : 26.9 pg  Mean Cell Hemoglobin Concentration : 30.7 gm/dL  Auto Neutrophil # : x  Auto Lymphocyte # : x  Auto Monocyte # : x  Auto Eosinophil # : x  Auto Basophil # : x  Auto Neutrophil % : x  Auto Lymphocyte % : x  Auto Monocyte % : x  Auto Eosinophil % : x  Auto Basophil % : x    04-16    141  |  102  |  23  ----------------------------<  87  3.5   |  29  |  0.93    Ca    9.8      16 Apr 2021 06:55    TPro  7.6  /  Alb  4.3  /  TBili  0.2  /  DBili  x   /  AST  34  /  ALT  19  /  AlkPhos  72  04-15    LIVER FUNCTIONS - ( 15 Apr 2021 21:21 )  Alb: 4.3 g/dL / Pro: 7.6 g/dL / ALK PHOS: 72 U/L / ALT: 19 U/L / AST: 34 U/L / GGT: x             < from: CT Head No Cont (04.16.21 @ 16:31) >    EXAM:  CT BRAIN                            PROCEDURE DATE:  04/16/2021            INTERPRETATION:  Clinical indications: Dizziness    Multiple axial sections were performed base skull to vertex without contrast enhancement. Coronal and sagittal reconstructions were performed as well.    This exam is compared with prior noncontrast head CT performed on January 12, 2009.    Increased parenchymal volume loss and chronic microvascular ischemic changes are identified    There is no evidence acute hemorrhage mass or mass effect seen.    Evaluation of the osseous structures with the appropriate window appears normal.    The visualized paranasal sinuses mastoid and middle ear regions appear clear.    IMPRESSION: No evidence of acute hemorrhage, mass or mass effect.                  AMIE ANDERS M.D., ATTENDING RADIOLOGIST  This document has been electronically signed. Apr 16 2021  4:34PM    < end of copied text >        Patient name: PERRY JACOBS  YOB: 1935   Age: 86 (F)   MR#: [id number]  Study Date: 4/16/2021  [facility] Radha Joe [facility]  Study quality: Technically difficult  Referring Physician: Keagan Marino MD  Blood Pressure: 154/94 mmHg  Height: 152 cm  Weight: 63 kg  BSA: 1.6 m2  ------------------------------------------------------------------------  PROCEDURE: Transthoracic echocardiogram with 2-D, M-Mode  and complete spectral and color flow Doppler.  INDICATION: Syncope and collapse (R55)  ------------------------------------------------------------------------  Dimensions:    Normal Values:  LA:     3.3    2.0 - 4.0 cm  Ao:     3.2    2.0 - 3.8 cm  SEPTUM: 1.1    0.6 - 1.2 cm  PWT:    0.8    0.6 - 1.1 cm  LVIDd:  3.8    3.0 - 5.6 cm  LVIDs:  2.7    1.8 - 4.0 cm  Derived variables:  LVMI: 69 g/m2  RWT: 0.42  EF (Visual Estimate): 65 %  Doppler Peak Velocity (m/sec): AoV=1.9 TV=2.5  ------------------------------------------------------------------------  Observations:  Mitral Valve: Normal mitral valve. Mitral annular  calcification.  Aortic Valve/Aorta: Calcified aortic valve with normal  opening. Mild aortic regurgitation.  Normal aortic root size.  Left Atrium: Normal left atrium.  Left Ventricle: Normal left ventricular internal  dimensions. Discrete upper septal hypertrophy.  Normal left ventricular systolic function. Probably no  segmental wall motion abnormalities.  Impaired LV-relaxation with normal filling pressure.  Right Heart: Normal right atrium. Normal right ventricular  size and systolic function.  Normal tricuspid valve. Mild tricuspid regurgitation.  Pulmonic valve not well visualized. Minimal pulmonic  regurgitation.  Pericardium/Pleura: Normal pericardium with no pericardial  effusion.  Hemodynamic: Estimated right atral pressure is normal.  No evidence of pulmonary hypertension.  No PFO seen with color Doppler.  ------------------------------------------------------------------------  Conclusions:  Normal left ventricular systolic function. Probably no  segmental wall motion abnormalities.  ------------------------------------------------------------------------  Confirmed on  4/16/2021 - 11:53:03 by Pilo Wallis MD,  VITA  ------------------------------------------------------------------------

## 2021-04-16 NOTE — CONSULT NOTE ADULT - ASSESSMENT
Dizziness  rule out orthostatic hypotension   monitor on tele   Echo   Neurology eval rule out CVA    HTN  BP meds on hold   will re evaluate     Sarcoidosis   consider pulm eval     CVA  s/p ILR   no events      Advanced care planning was discussed with patient and family.  Risks, benefits and alternatives of the cardiac treatments and medical therapy including procedures were discussed in detail and all questions were answered. Importance of compliance with medical therapy and lifestyle modification to improve cardiovascular health were addressed. Appropriate forms and patient educational materials were reviewed. 30 minutes face to face spent.

## 2021-04-16 NOTE — H&P ADULT - NSHPLABSRESULTS_GEN_ALL_CORE
11.4   6.13  )-----------( 221      ( 16 Apr 2021 06:55 )             37.1       04-16    141  |  102  |  23  ----------------------------<  87  3.5   |  29  |  0.93    Ca    9.8      16 Apr 2021 06:55    TPro  7.6  /  Alb  4.3  /  TBili  0.2  /  DBili  x   /  AST  34  /  ALT  19  /  AlkPhos  72  04-15                      Lactate Trend            CAPILLARY BLOOD GLUCOSE

## 2021-04-16 NOTE — ED ADULT NURSE REASSESSMENT NOTE - NS ED NURSE REASSESS COMMENT FT1
Patient resting comfortably in stretcher. Patient A&Ox4. Denies chest pain, dizziness, shortness of breath, nausea, vomiting, abdominal pain currently while lying down. Patient pending admission to inpatient telemetry unit. Plan of care discussed. Safety and comfort measures maintained.

## 2021-04-16 NOTE — CONSULT NOTE ADULT - SUBJECTIVE AND OBJECTIVE BOX
PULMONARY CONSULT    HPI: 87 y/o F with PMH of sarcoidosis of lung (on prednisone 5mg PO qd), CKD, CVA, TIA, HTN, appendectomy, hyster. Presents to ED with c/o SOB, chest discomfort substernally, lightheadedness for the pas, worsening over the past few days.     PAST MEDICAL & SURGICAL HISTORY:  HTN (hypertension)  ARF (acute renal failure) with glomerulonephritis  Anemia  Ectopic fetus  Sarcoidosis of lung  HLD (hyperlipidemia)  Hernia as a child  Mass calcified mass left hip area, 2011  Murmur  CVA (cerebral vascular accident)  TIA (transient ischemic attack)  S/P appendectomy  S/P hysterectomy with BLSO  S/P knee surgery for right torn meniscual  S/P cholecystectomy 11/2015  H/O cataract extraction, right with laser revision 3/2016      Allergies  codeine (Pruritus)  iodine (Hives)  morphine (Pruritus)  shellfish (Hives)  sulfa drugs (Hives)    FAMILY HISTORY:  Type 2 diabetes mellitus (Sibling)      Social history:     Review of Systems:  CONSTITUTIONAL: No fever, chills, or fatigue  EYES: No eye pain, visual disturbances, or discharge  ENMT:  No difficulty hearing, tinnitus, vertigo; No sinus or throat pain  NECK: No pain or stiffness  RESPIRATORY: Per above  CARDIOVASCULAR: No chest pain, palpitations, dizziness, or leg swelling  GASTROINTESTINAL: No abdominal or epigastric pain. No nausea, vomiting, or hematemesis; No diarrhea or constipation. No melena or hematochezia.  GENITOURINARY: No dysuria, frequency, hematuria, or incontinence  NEUROLOGICAL: No headaches, memory loss, loss of strength, numbness, or tremors  SKIN: No itching, burning, rashes, or lesions   MUSCULOSKELETAL: No joint pain or swelling; No muscle, back, or extremity pain  PSYCHIATRIC: No depression, anxiety, mood swings, or difficulty sleeping      Medications:  MEDICATIONS  (STANDING):  aspirin enteric coated 81 milliGRAM(s) Oral daily  atorvastatin 20 milliGRAM(s) Oral at bedtime  dextrose 40% Gel 15 Gram(s) Oral once  dextrose 5%. 1000 milliLiter(s) (50 mL/Hr) IV Continuous <Continuous>  dextrose 5%. 1000 milliLiter(s) (100 mL/Hr) IV Continuous <Continuous>  dextrose 50% Injectable 25 Gram(s) IV Push once  dextrose 50% Injectable 12.5 Gram(s) IV Push once  dextrose 50% Injectable 25 Gram(s) IV Push once  glucagon  Injectable 1 milliGRAM(s) IntraMuscular once  heparin   Injectable 5000 Unit(s) SubCutaneous every 12 hours  insulin lispro (ADMELOG) corrective regimen sliding scale   SubCutaneous three times a day before meals  metoprolol succinate ER 50 milliGRAM(s) Oral daily  pantoprazole    Tablet 40 milliGRAM(s) Oral before breakfast  PARoxetine 20 milliGRAM(s) Oral daily  predniSONE   Tablet 2.5 milliGRAM(s) Oral daily  valsartan 80 milliGRAM(s) Oral daily    MEDICATIONS  (PRN):  zolpidem 5 milliGRAM(s) Oral at bedtime PRN Insomnia            Vital Signs Last 24 Hrs  T(C): 37 (16 Apr 2021 08:49), Max: 37 (16 Apr 2021 08:49)  T(F): 98.6 (16 Apr 2021 08:49), Max: 98.6 (16 Apr 2021 08:49)  HR: 59 (16 Apr 2021 08:49) (58 - 65)  BP: 133/73 (16 Apr 2021 08:49) (133/73 - 159/76)  BP(mean): --  RR: 19 (16 Apr 2021 08:49) (16 - 19)  SpO2: 95% (16 Apr 2021 08:49) (95% - 100%)                LABS:                        11.4   6.13  )-----------( 221      ( 16 Apr 2021 06:55 )             37.1     04-16    141  |  102  |  23  ----------------------------<  87  3.5   |  29  |  0.93    Ca    9.8      16 Apr 2021 06:55    TPro  7.6  /  Alb  4.3  /  TBili  0.2  /  DBili  x   /  AST  34  /  ALT  19  /  AlkPhos  72  04-15            Serum Pro-Brain Natriuretic Peptide: 210 pg/mL (04-15-21 @ 21:21)            Physical Examination:    General: No acute distress.      HEENT: Pupils equal, reactive to light.  Symmetric.    PULM: Clear to auscultation bilaterally, no significant sputum production    CVS: S1, S2    ABD: Soft, nondistended, nontender, normoactive bowel sounds, no masses    EXT: No edema, nontender    SKIN: Warm and well perfused, no rashes noted.    NEURO: Alert, oriented, interactive, nonfocal      RADIOLOGY REVIEWED  CT chest: < from: CT Chest No Cont (04.15.21 @ 23:15) >  FINDINGS:    LUNGS AND AIRWAYS: Patent central airways.  Chronic reticular opacities in the superior segment of the right lower lobe are unchanged from 12/01/2008.  Scattered punctate calcifications in the periphery of the right lower lobe are stable from 12/01/2008.  Chronic linear scarring in the left lung, predominantly involving the left lower lobe with mild volume of the lingula is unchanged from 12/01/2008.  An 8 x 7 mm nodular opacity in the lingula (3:91) is stable from 12/01/2008.  A small focus of nodular scarring along the right major fissure (3:81) is unchanged from 12/01/2008.  Foci of air trapping in both upper lobes, the right middle lobe and lingula are similar to 12/01/2008.  PLEURA: There is chronic pleural thickening about the left lower lobe.  Coarse pleural calcification posterior to the left lower lobe is increased from 12/01/2008.  MEDIASTINUM AND SHAD: Calcified mediastinal and hilar lymph nodes appear unchanged from 12/01/2008.  VESSELS: Ectatic ascending aorta is mildly dilated to approximately 3.7 x 3.6 cm.  No thoracic aortic aneurysm.  Scattered atherosclerotic calcifications of the thoracic aorta and arch vessels.  HEART: Normal heart size.  Mild calcification aortic valve leaflets.  Moderate atherosclerotic calcification of the coronary arteries. No pericardial effusion.  CHEST WALL AND LOWER NECK: A right thyroid nodule measures approximately 1.3 x 1 cm (3:21).  Loop recorder in the right chest wall.  VISUALIZED UPPER ABDOMEN: Small hiatal hernia.  Status post cholecystectomy..  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  Mild degenerative changes in the spine.    IMPRESSION:  No evidence of pneumonia.  Chronic reticular opacities in the right lower lobe are unchanged from 12/01/2008.  Chronic linear scarring in the left lower lobe and lingula is unchanged from 12/01/2008.    < end of copied text >   PULMONARY CONSULT    HPI: 87 y/o F with PMH of sarcoidosis of lung (on prednisone qd for joints?), CKD, CVA, TIA, HTN, appendectomy, hyster. Presents to ED with c/o SOB, chest discomfort substernally, lightheadedness, worsening over the past few days. No c/o SOB at this time. CT chest grossly unchanged from 2008. Denies fevers, chills, cough.     PAST MEDICAL & SURGICAL HISTORY:  HTN (hypertension)  ARF (acute renal failure) with glomerulonephritis  Anemia  Ectopic fetus  Sarcoidosis of lung  HLD (hyperlipidemia)  Hernia as a child  Mass calcified mass left hip area, 2011  Murmur  CVA (cerebral vascular accident)  TIA (transient ischemic attack)  S/P appendectomy  S/P hysterectomy with BLSO  S/P knee surgery for right torn meniscual  S/P cholecystectomy 11/2015  H/O cataract extraction, right with laser revision 3/2016      Allergies  codeine (Pruritus)  iodine (Hives)  morphine (Pruritus)  shellfish (Hives)  sulfa drugs (Hives)    FAMILY HISTORY:  Type 2 diabetes mellitus (Sibling)      Social history: never a smoker     Review of Systems:  CONSTITUTIONAL: No fever, chills, or fatigue  EYES: No eye pain, visual disturbances, or discharge  ENMT:  No difficulty hearing, tinnitus, vertigo; No sinus or throat pain  NECK: No pain or stiffness  RESPIRATORY: Per above  CARDIOVASCULAR: Per above   GASTROINTESTINAL: No abdominal or epigastric pain. No nausea, vomiting, or hematemesis; No diarrhea or constipation. No melena or hematochezia.  GENITOURINARY: No dysuria, frequency, hematuria, or incontinence  NEUROLOGICAL: Per above   SKIN: No itching, burning, rashes, or lesions   MUSCULOSKELETAL: No joint pain or swelling; No muscle, back, or extremity pain  PSYCHIATRIC: No depression, anxiety, mood swings, or difficulty sleeping      Medications:  MEDICATIONS  (STANDING):  aspirin enteric coated 81 milliGRAM(s) Oral daily  atorvastatin 20 milliGRAM(s) Oral at bedtime  dextrose 40% Gel 15 Gram(s) Oral once  dextrose 5%. 1000 milliLiter(s) (50 mL/Hr) IV Continuous <Continuous>  dextrose 5%. 1000 milliLiter(s) (100 mL/Hr) IV Continuous <Continuous>  dextrose 50% Injectable 25 Gram(s) IV Push once  dextrose 50% Injectable 12.5 Gram(s) IV Push once  dextrose 50% Injectable 25 Gram(s) IV Push once  glucagon  Injectable 1 milliGRAM(s) IntraMuscular once  heparin   Injectable 5000 Unit(s) SubCutaneous every 12 hours  insulin lispro (ADMELOG) corrective regimen sliding scale   SubCutaneous three times a day before meals  metoprolol succinate ER 50 milliGRAM(s) Oral daily  pantoprazole    Tablet 40 milliGRAM(s) Oral before breakfast  PARoxetine 20 milliGRAM(s) Oral daily  predniSONE   Tablet 2.5 milliGRAM(s) Oral daily  valsartan 80 milliGRAM(s) Oral daily    MEDICATIONS  (PRN):  zolpidem 5 milliGRAM(s) Oral at bedtime PRN Insomnia            Vital Signs Last 24 Hrs  T(C): 37 (16 Apr 2021 08:49), Max: 37 (16 Apr 2021 08:49)  T(F): 98.6 (16 Apr 2021 08:49), Max: 98.6 (16 Apr 2021 08:49)  HR: 59 (16 Apr 2021 08:49) (58 - 65)  BP: 133/73 (16 Apr 2021 08:49) (133/73 - 159/76)  BP(mean): --  RR: 19 (16 Apr 2021 08:49) (16 - 19)  SpO2: 95% (16 Apr 2021 08:49) (95% - 100%)                LABS:                        11.4   6.13  )-----------( 221      ( 16 Apr 2021 06:55 )             37.1     04-16    141  |  102  |  23  ----------------------------<  87  3.5   |  29  |  0.93    Ca    9.8      16 Apr 2021 06:55    TPro  7.6  /  Alb  4.3  /  TBili  0.2  /  DBili  x   /  AST  34  /  ALT  19  /  AlkPhos  72  04-15            Serum Pro-Brain Natriuretic Peptide: 210 pg/mL (04-15-21 @ 21:21)            Physical Examination:    General: No acute distress.      HEENT: Pupils equal, reactive to light.  Symmetric.    PULM: Clear to auscultation bilaterally, no significant sputum production    CVS: S1, S2    ABD: Soft, nondistended, nontender, normoactive bowel sounds, no masses    EXT: No edema, nontender    SKIN: Warm and well perfused, no rashes noted.    NEURO: Alert, oriented, interactive, nonfocal      RADIOLOGY REVIEWED  CT chest: < from: CT Chest No Cont (04.15.21 @ 23:15) >  FINDINGS:    LUNGS AND AIRWAYS: Patent central airways.  Chronic reticular opacities in the superior segment of the right lower lobe are unchanged from 12/01/2008.  Scattered punctate calcifications in the periphery of the right lower lobe are stable from 12/01/2008.  Chronic linear scarring in the left lung, predominantly involving the left lower lobe with mild volume of the lingula is unchanged from 12/01/2008.  An 8 x 7 mm nodular opacity in the lingula (3:91) is stable from 12/01/2008.  A small focus of nodular scarring along the right major fissure (3:81) is unchanged from 12/01/2008.  Foci of air trapping in both upper lobes, the right middle lobe and lingula are similar to 12/01/2008.  PLEURA: There is chronic pleural thickening about the left lower lobe.  Coarse pleural calcification posterior to the left lower lobe is increased from 12/01/2008.  MEDIASTINUM AND SHAD: Calcified mediastinal and hilar lymph nodes appear unchanged from 12/01/2008.  VESSELS: Ectatic ascending aorta is mildly dilated to approximately 3.7 x 3.6 cm.  No thoracic aortic aneurysm.  Scattered atherosclerotic calcifications of the thoracic aorta and arch vessels.  HEART: Normal heart size.  Mild calcification aortic valve leaflets.  Moderate atherosclerotic calcification of the coronary arteries. No pericardial effusion.  CHEST WALL AND LOWER NECK: A right thyroid nodule measures approximately 1.3 x 1 cm (3:21).  Loop recorder in the right chest wall.  VISUALIZED UPPER ABDOMEN: Small hiatal hernia.  Status post cholecystectomy..  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  Mild degenerative changes in the spine.    IMPRESSION:  No evidence of pneumonia.  Chronic reticular opacities in the right lower lobe are unchanged from 12/01/2008.  Chronic linear scarring in the left lower lobe and lingula is unchanged from 12/01/2008.    < end of copied text >

## 2021-04-17 LAB
A1C WITH ESTIMATED AVERAGE GLUCOSE RESULT: 6.3 % — HIGH (ref 4–5.6)
CHOLEST SERPL-MCNC: 187 MG/DL — SIGNIFICANT CHANGE UP
COVID-19 SPIKE DOMAIN AB INTERP: POSITIVE
COVID-19 SPIKE DOMAIN ANTIBODY RESULT: >250 U/ML — HIGH
ESTIMATED AVERAGE GLUCOSE: 134 MG/DL — HIGH (ref 68–114)
GLUCOSE BLDC GLUCOMTR-MCNC: 101 MG/DL — HIGH (ref 70–99)
GLUCOSE BLDC GLUCOMTR-MCNC: 110 MG/DL — HIGH (ref 70–99)
GLUCOSE BLDC GLUCOMTR-MCNC: 119 MG/DL — HIGH (ref 70–99)
GLUCOSE BLDC GLUCOMTR-MCNC: 95 MG/DL — SIGNIFICANT CHANGE UP (ref 70–99)
HDLC SERPL-MCNC: 86 MG/DL — SIGNIFICANT CHANGE UP
LIPID PNL WITH DIRECT LDL SERPL: 90 MG/DL — SIGNIFICANT CHANGE UP
NON HDL CHOLESTEROL: 101 MG/DL — SIGNIFICANT CHANGE UP
SARS-COV-2 IGG+IGM SERPL QL IA: >250 U/ML — HIGH
SARS-COV-2 IGG+IGM SERPL QL IA: POSITIVE
TRIGL SERPL-MCNC: 56 MG/DL — SIGNIFICANT CHANGE UP

## 2021-04-17 RX ADMIN — HEPARIN SODIUM 5000 UNIT(S): 5000 INJECTION INTRAVENOUS; SUBCUTANEOUS at 05:46

## 2021-04-17 RX ADMIN — HEPARIN SODIUM 5000 UNIT(S): 5000 INJECTION INTRAVENOUS; SUBCUTANEOUS at 17:11

## 2021-04-17 RX ADMIN — VALSARTAN 80 MILLIGRAM(S): 80 TABLET ORAL at 05:46

## 2021-04-17 RX ADMIN — PANTOPRAZOLE SODIUM 40 MILLIGRAM(S): 20 TABLET, DELAYED RELEASE ORAL at 05:46

## 2021-04-17 RX ADMIN — ATORVASTATIN CALCIUM 20 MILLIGRAM(S): 80 TABLET, FILM COATED ORAL at 21:41

## 2021-04-17 RX ADMIN — Medication 81 MILLIGRAM(S): at 12:03

## 2021-04-17 RX ADMIN — Medication 2.5 MILLIGRAM(S): at 05:46

## 2021-04-17 RX ADMIN — Medication 50 MILLIGRAM(S): at 05:46

## 2021-04-17 RX ADMIN — Medication 20 MILLIGRAM(S): at 12:03

## 2021-04-18 LAB
ANION GAP SERPL CALC-SCNC: 11 MMOL/L — SIGNIFICANT CHANGE UP (ref 5–17)
BUN SERPL-MCNC: 23 MG/DL — SIGNIFICANT CHANGE UP (ref 7–23)
CALCIUM SERPL-MCNC: 9.7 MG/DL — SIGNIFICANT CHANGE UP (ref 8.4–10.5)
CHLORIDE SERPL-SCNC: 104 MMOL/L — SIGNIFICANT CHANGE UP (ref 96–108)
CO2 SERPL-SCNC: 24 MMOL/L — SIGNIFICANT CHANGE UP (ref 22–31)
CREAT SERPL-MCNC: 1.04 MG/DL — SIGNIFICANT CHANGE UP (ref 0.5–1.3)
GLUCOSE BLDC GLUCOMTR-MCNC: 118 MG/DL — HIGH (ref 70–99)
GLUCOSE BLDC GLUCOMTR-MCNC: 129 MG/DL — HIGH (ref 70–99)
GLUCOSE BLDC GLUCOMTR-MCNC: 95 MG/DL — SIGNIFICANT CHANGE UP (ref 70–99)
GLUCOSE BLDC GLUCOMTR-MCNC: 98 MG/DL — SIGNIFICANT CHANGE UP (ref 70–99)
GLUCOSE SERPL-MCNC: 89 MG/DL — SIGNIFICANT CHANGE UP (ref 70–99)
HCT VFR BLD CALC: 35.3 % — SIGNIFICANT CHANGE UP (ref 34.5–45)
HGB BLD-MCNC: 10.9 G/DL — LOW (ref 11.5–15.5)
MCHC RBC-ENTMCNC: 27 PG — SIGNIFICANT CHANGE UP (ref 27–34)
MCHC RBC-ENTMCNC: 30.9 GM/DL — LOW (ref 32–36)
MCV RBC AUTO: 87.6 FL — SIGNIFICANT CHANGE UP (ref 80–100)
NRBC # BLD: 0 /100 WBCS — SIGNIFICANT CHANGE UP (ref 0–0)
PLATELET # BLD AUTO: 192 K/UL — SIGNIFICANT CHANGE UP (ref 150–400)
POTASSIUM SERPL-MCNC: 3.9 MMOL/L — SIGNIFICANT CHANGE UP (ref 3.5–5.3)
POTASSIUM SERPL-SCNC: 3.9 MMOL/L — SIGNIFICANT CHANGE UP (ref 3.5–5.3)
RBC # BLD: 4.03 M/UL — SIGNIFICANT CHANGE UP (ref 3.8–5.2)
RBC # FLD: 14.9 % — HIGH (ref 10.3–14.5)
SODIUM SERPL-SCNC: 139 MMOL/L — SIGNIFICANT CHANGE UP (ref 135–145)
WBC # BLD: 6.18 K/UL — SIGNIFICANT CHANGE UP (ref 3.8–10.5)
WBC # FLD AUTO: 6.18 K/UL — SIGNIFICANT CHANGE UP (ref 3.8–10.5)

## 2021-04-18 PROCEDURE — 70544 MR ANGIOGRAPHY HEAD W/O DYE: CPT | Mod: 26,59

## 2021-04-18 PROCEDURE — 70551 MRI BRAIN STEM W/O DYE: CPT | Mod: 26

## 2021-04-18 PROCEDURE — 70547 MR ANGIOGRAPHY NECK W/O DYE: CPT | Mod: 26

## 2021-04-18 RX ORDER — ATORVASTATIN CALCIUM 80 MG/1
40 TABLET, FILM COATED ORAL AT BEDTIME
Refills: 0 | Status: DISCONTINUED | OUTPATIENT
Start: 2021-04-18 | End: 2021-04-19

## 2021-04-18 RX ORDER — DIPHENHYDRAMINE HCL 50 MG
50 CAPSULE ORAL ONCE
Refills: 0 | Status: COMPLETED | OUTPATIENT
Start: 2021-04-18 | End: 2021-04-19

## 2021-04-18 RX ADMIN — Medication 50 MILLIGRAM(S): at 05:33

## 2021-04-18 RX ADMIN — PANTOPRAZOLE SODIUM 40 MILLIGRAM(S): 20 TABLET, DELAYED RELEASE ORAL at 05:33

## 2021-04-18 RX ADMIN — HEPARIN SODIUM 5000 UNIT(S): 5000 INJECTION INTRAVENOUS; SUBCUTANEOUS at 18:43

## 2021-04-18 RX ADMIN — VALSARTAN 80 MILLIGRAM(S): 80 TABLET ORAL at 05:33

## 2021-04-18 RX ADMIN — Medication 2.5 MILLIGRAM(S): at 05:33

## 2021-04-18 RX ADMIN — HEPARIN SODIUM 5000 UNIT(S): 5000 INJECTION INTRAVENOUS; SUBCUTANEOUS at 05:33

## 2021-04-18 RX ADMIN — Medication 20 MILLIGRAM(S): at 12:03

## 2021-04-18 RX ADMIN — Medication 50 MILLIGRAM(S): at 18:43

## 2021-04-18 RX ADMIN — Medication 81 MILLIGRAM(S): at 12:03

## 2021-04-18 RX ADMIN — ATORVASTATIN CALCIUM 40 MILLIGRAM(S): 80 TABLET, FILM COATED ORAL at 21:56

## 2021-04-18 NOTE — CHART NOTE - NSCHARTNOTEFT_GEN_A_CORE
Appreciated Neuro recs.  Poor quality MRI studies - required CTA H/N and US carotid for further evaluation    Plan and Allergy reviewed with the patient  Patient states she is allergic to IV Contrast, with hives     Discussed with Radiology, patient to receive PO Prednisone 50 mg 13, 7, and 1 hour prior to the study (7PM, 1AM, and 7AM) along with PO Benadryl 50mg 1 hour prior to study (7AM)  Will hold the standing order of Prednisone 2.5 mg in the morning as patient will receive higher dose of Prednisone     Pre-medications instruction given to RN and the patient  CTA H/N scheduled @ 8 AM tomorrow  Dr. Marino made aware and agrees on the plan above    Letitia Fraser PA-C Appreciated Neuro and NeuroSx recs.  Poor quality MRI studies - required CTA H/N and US carotid for further evaluation    Plan and Allergy reviewed with the patient  Patient states she is allergic to IV Contrast, with hives     Discussed with Radiology, patient to receive PO Prednisone 50 mg 13, 7, and 1 hour prior to the study (7PM, 1AM, and 7AM) along with PO Benadryl 50mg 1 hour prior to study (7AM)  Will hold the standing order of Prednisone 2.5 mg in the morning as patient will receive higher dose of Prednisone     Pre-medications instruction given to RN and the patient  CTA H/N scheduled @ 8 AM tomorrow  Dr. Marino made aware and agrees on the plan above    Letitia Fraser PA-C

## 2021-04-18 NOTE — CONSULT NOTE ADULT - SUBJECTIVE AND OBJECTIVE BOX
p ([id number])     HPI:  86 year old F with pmhx of sarcoidosis of lung on steroids, ckd , on lasix, CVA, TIA, HTN and pshx of appendectomy, hysterectomy, presents to the ED with sob, chest discomfort substernally, lightheadedness for the past month progressing though not acutely over the past few days.  states worse sensation when standing and walking feels fine sitting down . she has ILR   Orthostatics obtained. HR goes from 62-->98 upon standing ?POTS, takes ambien at home, TTE neg   Etiology of Lightheadedness more cardiac in nature r/o infarct but lower suspicion  Follows with neurologist Bala Snow (does not come here). Oliver saw in house.   Echo unremarkable    Imaging:  MRI BRAIN: No acute infarction  MRA BRAIN BRAIN: No large vessel occlusion.  MRA NECK: Limited study secondary to lack of IV contrast. Possible severe stenosis in the proximal right internal carotid artery. Consider postcontrast images or ultrasound for further evaluation    Exam: Neuro intact    --Anticoagulation:  aspirin enteric coated 81 milliGRAM(s) Oral daily  heparin   Injectable 5000 Unit(s) SubCutaneous every 12 hours    =====================  PAST MEDICAL HISTORY   HTN (hypertension)    ARF (acute renal failure)    Anemia    Ectopic fetus    Sarcoidosis of lung    HLD (hyperlipidemia)    Hernia    Mass    Murmur    CVA (cerebral vascular accident)    TIA (transient ischemic attack)      PAST SURGICAL HISTORY   S/P appendectomy    S/P hysterectomy    S/P knee surgery    S/P cholecystectomy    H/O cataract extraction, right      codeine (Pruritus)  iodine (Hives)  IV Contrast (Hives; Rash)  morphine (Pruritus)  shellfish (Hives)  sulfa drugs (Hives)      MEDICATIONS:  Antibiotics:    Neuro:  acetaminophen   Tablet .. 650 milliGRAM(s) Oral every 6 hours PRN  PARoxetine 20 milliGRAM(s) Oral daily  zolpidem 5 milliGRAM(s) Oral at bedtime PRN    Other:  atorvastatin 20 milliGRAM(s) Oral at bedtime  dextrose 40% Gel 15 Gram(s) Oral once  dextrose 5%. 1000 milliLiter(s) IV Continuous <Continuous>  dextrose 5%. 1000 milliLiter(s) IV Continuous <Continuous>  dextrose 50% Injectable 25 Gram(s) IV Push once  dextrose 50% Injectable 12.5 Gram(s) IV Push once  dextrose 50% Injectable 25 Gram(s) IV Push once  glucagon  Injectable 1 milliGRAM(s) IntraMuscular once  insulin lispro (ADMELOG) corrective regimen sliding scale   SubCutaneous three times a day before meals  metoprolol succinate ER 50 milliGRAM(s) Oral daily  pantoprazole    Tablet 40 milliGRAM(s) Oral before breakfast  predniSONE   Tablet 2.5 milliGRAM(s) Oral daily  valsartan 80 milliGRAM(s) Oral daily      SOCIAL HISTORY:   Occupation:   Marital Status:     FAMILY HISTORY:  Type 2 diabetes mellitus (Sibling)        ROS: Negative except per HPI    LABS:                          10.9   6.18  )-----------( 192      ( 18 Apr 2021 06:16 )             35.3     04-18    139  |  104  |  23  ----------------------------<  89  3.9   |  24  |  1.04    Ca    9.7      18 Apr 2021 06:15

## 2021-04-18 NOTE — CONSULT NOTE ADULT - ASSESSMENT
PERRY JACOBS    85YO F PMHx sarcoidosis on steroids CKD (on lasix), CVA/TIA (no residual deficits), HTN, presented to ED with substernal chest pain/MADRID/lightheadedness intermittently x1mo. Follows with Dr. Bala Snow (does not come here) neurologist, Oliver saw in house. TTE and cardiac w/u unremarkable. MRI/MRA shows no DWI changes, chronic microvasc ischemic changes on FLAIR, and ?posible severe R ICA stenosis on poor quality MRA H/N, no intracranial vascular abnormalities. Exam: Neuro intact.   - Admitted to medicine service  - Needs CTA H/N and carotid dopplers for further eval  - Please document medical clearance for possible nsgy intervention for carotid stenosis  - On ASA 81 for hx CVA  - continue q4h neuro checks and PT/OT per primary    Will cont to follow for imaging/possible intervention PERRY JACOBS    85YO F PMHx sarcoidosis on steroids CKD (on lasix), CVA/TIA (no residual deficits), HTN, presented to ED with substernal chest pain/MADRID/lightheadedness intermittently x1mo. Follows with Dr. Bala Snwo (does not come here) neurologist, Oliver saw in house. TTE and cardiac w/u unremarkable. MRI/MRA shows no DWI changes, chronic microvasc ischemic changes on FLAIR, and ?posible severe R ICA stenosis on poor quality MRA H/N, no intracranial vascular abnormalities. Exam: Neuro intact.   - Admitted to medicine service  - Needs CTA H/N and carotid dopplers for further eval; please pre-medicate as needed for CTA H/N as it is critical for clinical decision making  - Please document medical clearance for possible nsgy intervention for carotid stenosis  - On ASA 81 for hx CVA  - continue q4h neuro checks and PT/OT per primary    Will cont to follow for imaging/possible intervention

## 2021-04-19 ENCOUNTER — TRANSCRIPTION ENCOUNTER (OUTPATIENT)
Age: 86
End: 2021-04-19

## 2021-04-19 VITALS — DIASTOLIC BLOOD PRESSURE: 82 MMHG | OXYGEN SATURATION: 98 % | HEART RATE: 72 BPM | SYSTOLIC BLOOD PRESSURE: 148 MMHG

## 2021-04-19 LAB
GLUCOSE BLDC GLUCOMTR-MCNC: 151 MG/DL — HIGH (ref 70–99)
GLUCOSE BLDC GLUCOMTR-MCNC: 152 MG/DL — HIGH (ref 70–99)
GLUCOSE BLDC GLUCOMTR-MCNC: 174 MG/DL — HIGH (ref 70–99)

## 2021-04-19 PROCEDURE — 83880 ASSAY OF NATRIURETIC PEPTIDE: CPT

## 2021-04-19 PROCEDURE — 70498 CT ANGIOGRAPHY NECK: CPT | Mod: 26

## 2021-04-19 PROCEDURE — 70547 MR ANGIOGRAPHY NECK W/O DYE: CPT

## 2021-04-19 PROCEDURE — 71046 X-RAY EXAM CHEST 2 VIEWS: CPT

## 2021-04-19 PROCEDURE — 83036 HEMOGLOBIN GLYCOSYLATED A1C: CPT

## 2021-04-19 PROCEDURE — 70496 CT ANGIOGRAPHY HEAD: CPT | Mod: 26

## 2021-04-19 PROCEDURE — 80053 COMPREHEN METABOLIC PANEL: CPT

## 2021-04-19 PROCEDURE — 93880 EXTRACRANIAL BILAT STUDY: CPT | Mod: 26

## 2021-04-19 PROCEDURE — 99285 EMERGENCY DEPT VISIT HI MDM: CPT | Mod: 25

## 2021-04-19 PROCEDURE — 70498 CT ANGIOGRAPHY NECK: CPT

## 2021-04-19 PROCEDURE — 71250 CT THORAX DX C-: CPT

## 2021-04-19 PROCEDURE — 86769 SARS-COV-2 COVID-19 ANTIBODY: CPT

## 2021-04-19 PROCEDURE — 85025 COMPLETE CBC W/AUTO DIFF WBC: CPT

## 2021-04-19 PROCEDURE — 70496 CT ANGIOGRAPHY HEAD: CPT

## 2021-04-19 PROCEDURE — 97162 PT EVAL MOD COMPLEX 30 MIN: CPT

## 2021-04-19 PROCEDURE — 70450 CT HEAD/BRAIN W/O DYE: CPT

## 2021-04-19 PROCEDURE — 80048 BASIC METABOLIC PNL TOTAL CA: CPT

## 2021-04-19 PROCEDURE — U0003: CPT

## 2021-04-19 PROCEDURE — U0005: CPT

## 2021-04-19 PROCEDURE — 93880 EXTRACRANIAL BILAT STUDY: CPT

## 2021-04-19 PROCEDURE — 84484 ASSAY OF TROPONIN QUANT: CPT

## 2021-04-19 PROCEDURE — 80061 LIPID PANEL: CPT

## 2021-04-19 PROCEDURE — 93306 TTE W/DOPPLER COMPLETE: CPT

## 2021-04-19 PROCEDURE — 70551 MRI BRAIN STEM W/O DYE: CPT

## 2021-04-19 PROCEDURE — 70544 MR ANGIOGRAPHY HEAD W/O DYE: CPT

## 2021-04-19 PROCEDURE — 85027 COMPLETE CBC AUTOMATED: CPT

## 2021-04-19 PROCEDURE — 82962 GLUCOSE BLOOD TEST: CPT

## 2021-04-19 RX ORDER — ZOLPIDEM TARTRATE 10 MG/1
1 TABLET ORAL
Qty: 0 | Refills: 0 | DISCHARGE

## 2021-04-19 RX ORDER — PANTOPRAZOLE SODIUM 20 MG/1
1 TABLET, DELAYED RELEASE ORAL
Qty: 30 | Refills: 0
Start: 2021-04-19 | End: 2021-05-18

## 2021-04-19 RX ORDER — METFORMIN HYDROCHLORIDE 850 MG/1
1 TABLET ORAL
Qty: 0 | Refills: 0 | DISCHARGE

## 2021-04-19 RX ORDER — AMLODIPINE BESYLATE 2.5 MG/1
1 TABLET ORAL
Qty: 0 | Refills: 0 | DISCHARGE

## 2021-04-19 RX ORDER — ACETAMINOPHEN 500 MG
2 TABLET ORAL
Qty: 0 | Refills: 0 | DISCHARGE
Start: 2021-04-19

## 2021-04-19 RX ORDER — ATORVASTATIN CALCIUM 80 MG/1
1 TABLET, FILM COATED ORAL
Qty: 30 | Refills: 0
Start: 2021-04-19 | End: 2021-05-18

## 2021-04-19 RX ADMIN — Medication 2: at 17:12

## 2021-04-19 RX ADMIN — HEPARIN SODIUM 5000 UNIT(S): 5000 INJECTION INTRAVENOUS; SUBCUTANEOUS at 17:13

## 2021-04-19 RX ADMIN — Medication 2: at 08:39

## 2021-04-19 RX ADMIN — Medication 50 MILLIGRAM(S): at 00:01

## 2021-04-19 RX ADMIN — Medication 20 MILLIGRAM(S): at 14:00

## 2021-04-19 RX ADMIN — Medication 50 MILLIGRAM(S): at 06:47

## 2021-04-19 RX ADMIN — Medication 50 MILLIGRAM(S): at 05:39

## 2021-04-19 RX ADMIN — HEPARIN SODIUM 5000 UNIT(S): 5000 INJECTION INTRAVENOUS; SUBCUTANEOUS at 05:40

## 2021-04-19 RX ADMIN — PANTOPRAZOLE SODIUM 40 MILLIGRAM(S): 20 TABLET, DELAYED RELEASE ORAL at 05:39

## 2021-04-19 RX ADMIN — VALSARTAN 80 MILLIGRAM(S): 80 TABLET ORAL at 05:39

## 2021-04-19 RX ADMIN — Medication 2: at 12:17

## 2021-04-19 RX ADMIN — Medication 81 MILLIGRAM(S): at 14:00

## 2021-04-19 NOTE — DISCHARGE NOTE PROVIDER - NSDCCPTREATMENT_GEN_ALL_CORE_FT
PRINCIPAL PROCEDURE  Procedure: Transthoracic echo  Findings and Treatment: Observations:  Mitral Valve: Normal mitral valve. Mitral annular  calcification.  Aortic Valve/Aorta: Calcified aortic valve with normal  opening. Mild aortic regurgitation.  Normal aortic root size.  Left Atrium: Normal left atrium.  Left Ventricle: Normal left ventricular internal  dimensions. Discrete upper septal hypertrophy.  Normal left ventricular systolic function. Probably no  segmental wall motion abnormalities.  Impaired LV-relaxation with normal filling pressure.  Right Heart: Normal right atrium. Normal right ventricular  size and systolic function.  Normal tricuspid valve. Mild tricuspid regurgitation.  Pulmonic valve not well visualized. Minimal pulmonic  regurgitation.  Pericardium/Pleura: Normal pericardium with no pericardial  effusion.  Hemodynamic: Estimated right atral pressure is normal.  No evidence of pulmonary hypertension.  No PFO seen with color Doppler.  ------------------------------------------------------------------------  Conclusions:  Normal left ventricular systolic function. Probably no  segmental wall motion abnormalities.

## 2021-04-19 NOTE — PROGRESS NOTE ADULT - PROVIDER SPECIALTY LIST ADULT
Cardiology
Internal Medicine
Neurosurgery
Cardiology
Internal Medicine
Internal Medicine
Neurology
Cardiology
Neurology
Pulmonology

## 2021-04-19 NOTE — DISCHARGE NOTE PROVIDER - NSDCCPCAREPLAN_GEN_ALL_CORE_FT
PRINCIPAL DISCHARGE DIAGNOSIS  Diagnosis: Shortness of breath  Assessment and Plan of Treatment: Resolved  You were seen and evaluated by Pulm. Doubt Sarcoidosis exacerbation. Please take medications as directed and follow up with PCP      SECONDARY DISCHARGE DIAGNOSES  Diagnosis: Lightheadedness  Assessment and Plan of Treatment: Orthostatic hypotension negative.  Echo unremarkable  MRI brain without acute infarct, question of R ICA severe stenosis noted (poor study) A1c 6.3. , CTA H/N unremarkable w/o hemodynamically significant stenosis.   Please follow up with Neuro for further workup and management     PRINCIPAL DISCHARGE DIAGNOSIS  Diagnosis: Shortness of breath  Assessment and Plan of Treatment: Resolved  You were seen and evaluated by Pulm. Doubt Sarcoidosis exacerbation. Please take medications as directed and follow up with PCP      SECONDARY DISCHARGE DIAGNOSES  Diagnosis: Lightheadedness  Assessment and Plan of Treatment: Orthostatic hypotension negative.  Echo unremarkable  Carotid doppler negative for stenosis  MRI brain without acute infarct, question of R ICA severe stenosis noted (poor study) A1c 6.3. , CTA H/N unremarkable w/o hemodynamically significant stenosis.   Please follow up with Neuro for further workup and management

## 2021-04-19 NOTE — PROGRESS NOTE ADULT - PROBLEM SELECTOR PLAN 2
-Not exacerbated  -CT chest grossly unchanged from prior CT chest 2008  -States on prednisone 5mg PO qd for joint issues?  -D/c planning per primary team, f/u outpatient pulm

## 2021-04-19 NOTE — DISCHARGE NOTE PROVIDER - CARE PROVIDER_API CALL
Emmy Paredes (DO)  Family Medicine  [address], [address]  [place] [address]  Phone: [phone]  Fax: [phone]  Follow Up Time: 1 week

## 2021-04-19 NOTE — PROGRESS NOTE ADULT - SUBJECTIVE AND OBJECTIVE BOX
DATE OF SERVICE: 04-17-21 @ 13:40    Subjective: Patient seen and examined. No new events except as noted.     SUBJECTIVE/ROS:    feels better     MEDICATIONS:  MEDICATIONS  (STANDING):  aspirin enteric coated 81 milliGRAM(s) Oral daily  atorvastatin 20 milliGRAM(s) Oral at bedtime  dextrose 40% Gel 15 Gram(s) Oral once  dextrose 5%. 1000 milliLiter(s) (50 mL/Hr) IV Continuous <Continuous>  dextrose 5%. 1000 milliLiter(s) (100 mL/Hr) IV Continuous <Continuous>  dextrose 50% Injectable 25 Gram(s) IV Push once  dextrose 50% Injectable 12.5 Gram(s) IV Push once  dextrose 50% Injectable 25 Gram(s) IV Push once  glucagon  Injectable 1 milliGRAM(s) IntraMuscular once  heparin   Injectable 5000 Unit(s) SubCutaneous every 12 hours  insulin lispro (ADMELOG) corrective regimen sliding scale   SubCutaneous three times a day before meals  metoprolol succinate ER 50 milliGRAM(s) Oral daily  pantoprazole    Tablet 40 milliGRAM(s) Oral before breakfast  PARoxetine 20 milliGRAM(s) Oral daily  predniSONE   Tablet 2.5 milliGRAM(s) Oral daily  valsartan 80 milliGRAM(s) Oral daily      PHYSICAL EXAM:  T(C): 36.7 (04-17-21 @ 12:40), Max: 36.7 (04-16-21 @ 16:13)  HR: 65 (04-17-21 @ 12:40) (60 - 65)  BP: 122/75 (04-17-21 @ 12:40) (112/71 - 169/80)  RR: 18 (04-17-21 @ 12:40) (18 - 18)  SpO2: 96% (04-17-21 @ 12:40) (94% - 96%)  Wt(kg): --  I&O's Summary    16 Apr 2021 07:01  -  17 Apr 2021 07:00  --------------------------------------------------------  IN: 300 mL / OUT: 650 mL / NET: -350 mL    17 Apr 2021 07:01  -  17 Apr 2021 13:40  --------------------------------------------------------  IN: 240 mL / OUT: 0 mL / NET: 240 mL            JVP: Normal  Neck: supple  Lung: clear   CV: S1 S2 , Murmur:  Abd: soft  Ext: No edema  neuro: Awake / alert  Psych: flat affect  Skin: normal``    LABS/DATA:    CARDIAC MARKERS:                                11.4   6.13  )-----------( 221      ( 16 Apr 2021 06:55 )             37.1     04-16    141  |  102  |  23  ----------------------------<  87  3.5   |  29  |  0.93    Ca    9.8      16 Apr 2021 06:55    TPro  7.6  /  Alb  4.3  /  TBili  0.2  /  DBili  x   /  AST  34  /  ALT  19  /  AlkPhos  72  04-15    proBNP:   Lipid Profile:   HgA1c:   TSH:     TELE:  EKG:      < from: Transthoracic Echocardiogram (04.16.21 @ 09:21) >  Conclusions:  Normal left ventricular systolic function. Probably no  segmental wall motion abnormalities.  ------------------------------------------------------------------------  Confirmed on  4/16/2021 - 11:53:03 by Pilo Wallis MD, FASE  ------------------------------------------------------------------------    < end of copied text >    
DATE OF SERVICE: 04-18-21 @ 11:05  CHIEF COMPLAINT:Patient is a 86y old  Female who presents with a chief complaint of   	        PAST MEDICAL & SURGICAL HISTORY:  HTN (hypertension)    ARF (acute renal failure)  with glomerulonephritis    Anemia    Ectopic fetus    Sarcoidosis of lung    HLD (hyperlipidemia)    Hernia  as a child    Mass  calcified mass left hip area, 2011    Murmur    CVA (cerebral vascular accident)    TIA (transient ischemic attack)    S/P appendectomy    S/P hysterectomy  with BLSO    S/P knee surgery  for right torn meniscual    S/P cholecystectomy  11/2015    H/O cataract extraction, right  with laser revision 3/2016            feels better  RESPIRATORY: No cough, wheezing, chills or hemoptysis; No Shortness of Breath  CARDIOVASCULAR: No chest pain, palpitations, passing out,  less dizzy  GASTROINTESTINAL: No abdominal or epigastric pain. No nausea, vomiting, or hematemesis; No diarrhea or constipation. No melena or hematochezia.  GENITOURINARY: No dysuria, frequency, hematuria, or incontinence  NEUROLOGICAL: No headaches, memory loss, loss of strength, numbness, or tremors  ss  MUSCULOSKELETAL: No joint pain or swelling; No muscle, back, or extremity pain    Medications:  MEDICATIONS  (STANDING):  aspirin enteric coated 81 milliGRAM(s) Oral daily  atorvastatin 20 milliGRAM(s) Oral at bedtime  dextrose 40% Gel 15 Gram(s) Oral once  dextrose 5%. 1000 milliLiter(s) (50 mL/Hr) IV Continuous <Continuous>  dextrose 5%. 1000 milliLiter(s) (100 mL/Hr) IV Continuous <Continuous>  dextrose 50% Injectable 25 Gram(s) IV Push once  dextrose 50% Injectable 12.5 Gram(s) IV Push once  dextrose 50% Injectable 25 Gram(s) IV Push once  glucagon  Injectable 1 milliGRAM(s) IntraMuscular once  heparin   Injectable 5000 Unit(s) SubCutaneous every 12 hours  insulin lispro (ADMELOG) corrective regimen sliding scale   SubCutaneous three times a day before meals  metoprolol succinate ER 50 milliGRAM(s) Oral daily  pantoprazole    Tablet 40 milliGRAM(s) Oral before breakfast  PARoxetine 20 milliGRAM(s) Oral daily  predniSONE   Tablet 2.5 milliGRAM(s) Oral daily  valsartan 80 milliGRAM(s) Oral daily    MEDICATIONS  (PRN):  acetaminophen   Tablet .. 650 milliGRAM(s) Oral every 6 hours PRN Mild Pain (1 - 3)  zolpidem 5 milliGRAM(s) Oral at bedtime PRN Insomnia    	    PHYSICAL EXAM:  T(C): 36.4 (04-18-21 @ 04:08), Max: 37.1 (04-17-21 @ 20:46)  HR: 59 (04-18-21 @ 04:08) (59 - 65)  BP: 136/71 (04-18-21 @ 04:08) (122/75 - 149/80)  RR: 18 (04-18-21 @ 04:08) (18 - 18)  SpO2: 94% (04-18-21 @ 04:08) (94% - 97%)  Wt(kg): --  I&O's Summary    17 Apr 2021 07:01  -  18 Apr 2021 07:00  --------------------------------------------------------  IN: 420 mL / OUT: 750 mL / NET: -330 mL    18 Apr 2021 07:01  -  18 Apr 2021 11:05  --------------------------------------------------------  IN: 210 mL / OUT: 0 mL / NET: 210 mL        Appearance: Normal	  HEENT:   Normal oral mucosa, PERRL, EOMI	  Lymphatic: No lymphadenopathy  Cardiovascular: Normal S1 S2, No JVD, No murmurs, No edema  Respiratory: Lungs clear to auscultation	  Psychiatry: A & O x 3, Mood & affect appropriate  Gastrointestinal:  Soft, Non-tender, + BS	  Skin: No rashes, No ecchymoses, No cyanosis	  Neurologic: Non-focal  Extremities: Normal range of motion, No clubbing, cyanosis or edema  Vascular: Peripheral pulses palpable 2+ bilaterally    TELEMETRY: 	    ECG:  	  RADIOLOGY:  OTHER: 	  	  LABS:	 	    CARDIAC MARKERS:                                10.9   6.18  )-----------( 192      ( 18 Apr 2021 06:16 )             35.3     04-18    139  |  104  |  23  ----------------------------<  89  3.9   |  24  |  1.04    Ca    9.7      18 Apr 2021 06:15      proBNP:   Lipid Profile:   HgA1c:   TSH:     	        
DATE OF SERVICE: 04-19-21 @ 15:43  CHIEF COMPLAINT:Patient is a 86y old  Female who presents with a chief complaint of   	        PAST MEDICAL & SURGICAL HISTORY:  HTN (hypertension)    ARF (acute renal failure)  with glomerulonephritis    Anemia    Ectopic fetus    Sarcoidosis of lung    HLD (hyperlipidemia)    Hernia  as a child    Mass  calcified mass left hip area, 2011    Murmur    CVA (cerebral vascular accident)    TIA (transient ischemic attack)    S/P appendectomy    S/P hysterectomy  with BLSO    S/P knee surgery  for right torn meniscual    S/P cholecystectomy  11/2015    H/O cataract extraction, right  with laser revision 3/2016            feels better  RESPIRATORY: No cough, wheezing, chills or hemoptysis; No Shortness of Breath  CARDIOVASCULAR: No chest pain, palpitations, passing out, dizziness, or leg swelling  GASTROINTESTINAL: No abdominal or epigastric pain. No nausea, vomiting, or hematemesis; No diarrhea or constipation. No melena or hematochezia.  GENITOURINARY: No dysuria, frequency, hematuria, or incontinence  NEUROLOGICAL: No headaches, memory loss, loss of strength, numbness, or tremors    MUSCULOSKELETAL: No joint pain or swelling; No muscle, back, or extremity pain    Medications:  MEDICATIONS  (STANDING):  aspirin enteric coated 81 milliGRAM(s) Oral daily  atorvastatin 40 milliGRAM(s) Oral at bedtime  dextrose 40% Gel 15 Gram(s) Oral once  dextrose 5%. 1000 milliLiter(s) (50 mL/Hr) IV Continuous <Continuous>  dextrose 5%. 1000 milliLiter(s) (100 mL/Hr) IV Continuous <Continuous>  dextrose 50% Injectable 25 Gram(s) IV Push once  dextrose 50% Injectable 12.5 Gram(s) IV Push once  dextrose 50% Injectable 25 Gram(s) IV Push once  glucagon  Injectable 1 milliGRAM(s) IntraMuscular once  heparin   Injectable 5000 Unit(s) SubCutaneous every 12 hours  insulin lispro (ADMELOG) corrective regimen sliding scale   SubCutaneous three times a day before meals  metoprolol succinate ER 50 milliGRAM(s) Oral daily  pantoprazole    Tablet 40 milliGRAM(s) Oral before breakfast  PARoxetine 20 milliGRAM(s) Oral daily  predniSONE   Tablet 5 milliGRAM(s) Oral daily  valsartan 80 milliGRAM(s) Oral daily    MEDICATIONS  (PRN):  acetaminophen   Tablet .. 650 milliGRAM(s) Oral every 6 hours PRN Mild Pain (1 - 3)  zolpidem 5 milliGRAM(s) Oral at bedtime PRN Insomnia    	    PHYSICAL EXAM:  T(C): 36.3 (04-19-21 @ 04:26), Max: 37.2 (04-18-21 @ 16:50)  HR: 72 (04-19-21 @ 13:26) (56 - 72)  BP: 148/82 (04-19-21 @ 13:26) (113/68 - 157/80)  RR: 18 (04-19-21 @ 04:26) (18 - 18)  SpO2: 98% (04-19-21 @ 13:26) (96% - 98%)  Wt(kg): --  I&O's Summary    18 Apr 2021 07:01  -  19 Apr 2021 07:00  --------------------------------------------------------  IN: 760 mL / OUT: 0 mL / NET: 760 mL    19 Apr 2021 07:01  -  19 Apr 2021 15:43  --------------------------------------------------------  IN: 200 mL / OUT: 200 mL / NET: 0 mL        Appearance: Normal	  HEENT:   Normal oral mucosa, PERRL, EOMI	  Lymphatic: No lymphadenopathy  Cardiovascular: Normal S1 S2, No JVD, No murmurs, No edema  Respiratory: Lungs clear to auscultation	  Psychiatry: A & O x 3, Mood & affect appropriate  Gastrointestinal:  Soft, Non-tender, + BS	  Skin: No rashes, No ecchymoses, No cyanosis	  Neurologic: Non-focal  Extremities: Normal range of motion, No clubbing, cyanosis or edema  Vascular: Peripheral pulses palpable 2+ bilaterally    TELEMETRY: 	    ECG:  	  RADIOLOGY:  OTHER: 	  	  LABS:	 	    CARDIAC MARKERS:                                10.9   6.18  )-----------( 192      ( 18 Apr 2021 06:16 )             35.3     04-18    139  |  104  |  23  ----------------------------<  89  3.9   |  24  |  1.04    Ca    9.7      18 Apr 2021 06:15      proBNP:   Lipid Profile:   HgA1c:   TSH:     	        
Follow-up Pulm Progress Note    No new respiratory events overnight  Mild MADRID - unchanged from her baseline x years   Denies CP    Medications:  MEDICATIONS  (STANDING):  aspirin enteric coated 81 milliGRAM(s) Oral daily  atorvastatin 40 milliGRAM(s) Oral at bedtime  dextrose 40% Gel 15 Gram(s) Oral once  dextrose 5%. 1000 milliLiter(s) (50 mL/Hr) IV Continuous <Continuous>  dextrose 5%. 1000 milliLiter(s) (100 mL/Hr) IV Continuous <Continuous>  dextrose 50% Injectable 25 Gram(s) IV Push once  dextrose 50% Injectable 12.5 Gram(s) IV Push once  dextrose 50% Injectable 25 Gram(s) IV Push once  glucagon  Injectable 1 milliGRAM(s) IntraMuscular once  heparin   Injectable 5000 Unit(s) SubCutaneous every 12 hours  insulin lispro (ADMELOG) corrective regimen sliding scale   SubCutaneous three times a day before meals  metoprolol succinate ER 50 milliGRAM(s) Oral daily  pantoprazole    Tablet 40 milliGRAM(s) Oral before breakfast  PARoxetine 20 milliGRAM(s) Oral daily  predniSONE   Tablet 5 milliGRAM(s) Oral daily  valsartan 80 milliGRAM(s) Oral daily    MEDICATIONS  (PRN):  acetaminophen   Tablet .. 650 milliGRAM(s) Oral every 6 hours PRN Mild Pain (1 - 3)  zolpidem 5 milliGRAM(s) Oral at bedtime PRN Insomnia          Vital Signs Last 24 Hrs  T(C): 36.3 (19 Apr 2021 04:26), Max: 37.2 (18 Apr 2021 16:50)  T(F): 97.4 (19 Apr 2021 04:26), Max: 98.9 (18 Apr 2021 16:50)  HR: 72 (19 Apr 2021 13:26) (56 - 72)  BP: 148/82 (19 Apr 2021 13:26) (113/68 - 157/80)  BP(mean): --  RR: 18 (19 Apr 2021 04:26) (18 - 18)  SpO2: 98% (19 Apr 2021 13:26) (96% - 98%)          04-18 @ 07:01  -  04-19 @ 07:00  --------------------------------------------------------  IN: 760 mL / OUT: 0 mL / NET: 760 mL          LABS:                        10.9   6.18  )-----------( 192      ( 18 Apr 2021 06:16 )             35.3     04-18    139  |  104  |  23  ----------------------------<  89  3.9   |  24  |  1.04    Ca    9.7      18 Apr 2021 06:15            CAPILLARY BLOOD GLUCOSE      POCT Blood Glucose.: 174 mg/dL (19 Apr 2021 12:15)            Physical Examination:  PULM: Clear to auscultation bilaterally, no significant sputum production  CVS: S1, S2 heard    RADIOLOGY REVIEWED      CT chest: < from: CT Chest No Cont (04.15.21 @ 23:15) >  FINDINGS:    LUNGS AND AIRWAYS: Patent central airways.  Chronic reticular opacities in the superior segment of the right lower lobe are unchanged from 12/01/2008.  Scattered punctate calcifications in the periphery of the right lower lobe are stable from 12/01/2008.  Chronic linear scarring in the left lung, predominantly involving the left lower lobe with mild volume of the lingula is unchanged from 12/01/2008.  An 8 x 7 mm nodular opacity in the lingula (3:91) is stable from 12/01/2008.  A small focus of nodular scarring along the right major fissure (3:81) is unchanged from 12/01/2008.  Foci of air trapping in both upper lobes, the right middle lobe and lingula are similar to 12/01/2008.  PLEURA: There is chronic pleural thickening about the left lower lobe.  Coarse pleural calcification posterior to the left lower lobe is increased from 12/01/2008.  MEDIASTINUM AND SHAD: Calcified mediastinal and hilar lymph nodes appear unchanged from 12/01/2008.  VESSELS: Ectatic ascending aorta is mildly dilated to approximately 3.7 x 3.6 cm.  No thoracic aortic aneurysm.  Scattered atherosclerotic calcifications of the thoracic aorta and arch vessels.  HEART: Normal heart size.  Mild calcification aortic valve leaflets.  Moderate atherosclerotic calcification of the coronary arteries. No pericardial effusion.  CHEST WALL AND LOWER NECK: A right thyroid nodule measures approximately 1.3 x 1 cm (3:21).  Loop recorder in the right chest wall.  VISUALIZED UPPER ABDOMEN: Small hiatal hernia.  Status post cholecystectomy..  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  Mild degenerative changes in the spine.    IMPRESSION:  No evidence of pneumonia.  Chronic reticular opacities in the right lower lobe are unchanged from 12/01/2008.  Chronic linear scarring in the left lower lobe and lingula is unchanged from 12/01/2008.    < end of copied text >    
Neurology Progress Note    S: Patient seen and examined. No new events overnight. patient denied CP, SOB, HA or pain. seen by nsx to workup ICA stenosis     Medication:  acetaminophen   Tablet .. 650 milliGRAM(s) Oral every 6 hours PRN  aspirin enteric coated 81 milliGRAM(s) Oral daily  atorvastatin 20 milliGRAM(s) Oral at bedtime  dextrose 40% Gel 15 Gram(s) Oral once  dextrose 5%. 1000 milliLiter(s) IV Continuous <Continuous>  dextrose 5%. 1000 milliLiter(s) IV Continuous <Continuous>  dextrose 50% Injectable 25 Gram(s) IV Push once  dextrose 50% Injectable 12.5 Gram(s) IV Push once  dextrose 50% Injectable 25 Gram(s) IV Push once  glucagon  Injectable 1 milliGRAM(s) IntraMuscular once  heparin   Injectable 5000 Unit(s) SubCutaneous every 12 hours  insulin lispro (ADMELOG) corrective regimen sliding scale   SubCutaneous three times a day before meals  metoprolol succinate ER 50 milliGRAM(s) Oral daily  pantoprazole    Tablet 40 milliGRAM(s) Oral before breakfast  PARoxetine 20 milliGRAM(s) Oral daily  predniSONE   Tablet 2.5 milliGRAM(s) Oral daily  valsartan 80 milliGRAM(s) Oral daily  zolpidem 5 milliGRAM(s) Oral at bedtime PRN      Vitals:  Vital Signs Last 24 Hrs  T(C): 37.2 (18 Apr 2021 12:21), Max: 37.2 (18 Apr 2021 12:21)  T(F): 99 (18 Apr 2021 12:21), Max: 99 (18 Apr 2021 12:21)  HR: 61 (18 Apr 2021 12:21) (59 - 61)  BP: 139/73 (18 Apr 2021 12:21) (131/80 - 149/80)  BP(mean): 93 (18 Apr 2021 04:08) (93 - 103)  RR: 18 (18 Apr 2021 12:21) (18 - 18)  SpO2: 94% (18 Apr 2021 12:21) (94% - 97%)    Orthostatic VS    04-17-21 @ 17:05  Lying BP: 122/60 HR: 60   Sitting BP: 135/70 HR: 62  Standing BP: 143/80 HR: 65  Site: --   Mode: --    04-16-21 @ 12:52  Lying BP: 120/76 HR: 62   Sitting BP: 166/82 HR: 63  Standing BP: 155/83 HR: 98  Site: --   Mode: --      General Exam:   General Appearance: Appropriately dressed and in no acute distress       Head: Normocephalic, atraumatic and no dysmorphic features  Ear, Nose, and Throat: Moist mucous membranes  CVS: S1S2+  Resp: No SOB, no wheeze or rhonchi  GI: soft NT/ND  Extremities: No edema or cyanosis  Skin: No bruises or rashes     Neurological Exam:  Mental Status: Awake, alert and oriented x 3.  Able to follow simple and complex verbal commands. Able to name and repeat. fluent speech. No obvious aphasia or dysarthria noted.   Cranial Nerves: PERRL, EOMI, VFFC, sensation V1-V3 intact,  no obvious facial asymmetry, equal elevation of palate, scm/trap 5/5, tongue is midline on protrusion. no obvious papilledema on fundoscopic exam. hearing is grossly intact.   Motor: Normal bulk, tone and strength throughout. Fine finger movements were intact and symmetric. no tremors or drift noted.    Sensation: Intact to light touch and pinprick throughout. no right/left confusion. no extinction to tactile on DSS. Romberg was negative.   Reflexes: 1+ throughout at biceps, brachioradialis, triceps, patellars and ankles bilaterally and equal. No clonus. R toe and L toe were both downgoing.  Coordination: No dysmetria on FNF or HKS  Gait: able to ambulate but feels dizzy    I personally reviewed the below data/images/labs:      CBC Full  -  ( 18 Apr 2021 06:16 )  WBC Count : 6.18 K/uL  RBC Count : 4.03 M/uL  Hemoglobin : 10.9 g/dL  Hematocrit : 35.3 %  Platelet Count - Automated : 192 K/uL  Mean Cell Volume : 87.6 fl  Mean Cell Hemoglobin : 27.0 pg  Mean Cell Hemoglobin Concentration : 30.9 gm/dL  Auto Neutrophil # : x  Auto Lymphocyte # : x  Auto Monocyte # : x  Auto Eosinophil # : x  Auto Basophil # : x  Auto Neutrophil % : x  Auto Lymphocyte % : x  Auto Monocyte % : x  Auto Eosinophil % : x  Auto Basophil % : x    04-18    139  |  104  |  23  ----------------------------<  89  3.9   |  24  |  1.04    Ca    9.7      18 Apr 2021 06:15      EXAM:  CT BRAIN                            PROCEDURE DATE:  04/16/2021            INTERPRETATION:  Clinical indications: Dizziness    Multiple axial sections were performed base skull to vertex without contrast enhancement. Coronal and sagittal reconstructions were performed as well.    This exam is compared with prior noncontrast head CT performed on January 12, 2009.    Increased parenchymal volume loss and chronic microvascular ischemic changes are identified    There is no evidence acute hemorrhage mass or mass effect seen.    Evaluation of the osseous structures with the appropriate window appears normal.    The visualized paranasal sinuses mastoid and middle ear regions appear clear.    IMPRESSION: No evidence of acute hemorrhage, mass or mass effect.                  AMIE ANDERS M.D., ATTENDING RADIOLOGIST  This document has been electronically signed. Apr 16 2021  4:34PM    < end of copied text >        Patient name: PERRY JACOBS  YOB: 1935   Age: 86 (F)   MR#: [id number]  Study Date: 4/16/2021  [facility] Radha Joe RD[facility]  Study quality: Technically difficult  Referring Physician: Keagan Marino MD  Blood Pressure: 154/94 mmHg  Height: 152 cm  Weight: 63 kg  BSA: 1.6 m2  ------------------------------------------------------------------------  PROCEDURE: Transthoracic echocardiogram with 2-D, M-Mode  and complete spectral and color flow Doppler.  INDICATION: Syncope and collapse (R55)  ------------------------------------------------------------------------  Dimensions:    Normal Values:  LA:     3.3    2.0 - 4.0 cm  Ao:     3.2    2.0 - 3.8 cm  SEPTUM: 1.1    0.6 - 1.2 cm  PWT:    0.8    0.6 - 1.1 cm  LVIDd:  3.8    3.0 - 5.6 cm  LVIDs:  2.7    1.8 - 4.0 cm  Derived variables:  LVMI: 69 g/m2  RWT: 0.42  EF (Visual Estimate): 65 %  Doppler Peak Velocity (m/sec): AoV=1.9 TV=2.5  ------------------------------------------------------------------------  Observations:  Mitral Valve: Normal mitral valve. Mitral annular  calcification.  Aortic Valve/Aorta: Calcified aortic valve with normal  opening. Mild aortic regurgitation.  Normal aortic root size.  Left Atrium: Normal left atrium.  Left Ventricle: Normal left ventricular internal  dimensions. Discrete upper septal hypertrophy.  Normal left ventricular systolic function. Probably no  segmental wall motion abnormalities.  Impaired LV-relaxation with normal filling pressure.  Right Heart: Normal right atrium. Normal right ventricular  size and systolic function.  Normal tricuspid valve. Mild tricuspid regurgitation.  Pulmonic valve not well visualized. Minimal pulmonic  regurgitation.  Pericardium/Pleura: Normal pericardium with no pericardial  effusion.  Hemodynamic: Estimated right atral pressure is normal.  No evidence of pulmonary hypertension.  No PFO seen with color Doppler.  ------------------------------------------------------------------------  Conclusions:  Normal left ventricular systolic function. Probably no  segmental wall motion abnormalities.  ------------------------------------------------------------------------  Confirmed on  4/16/2021 - 11:53:03 by Pilo Wallis MD, FASE  --------------------      < from: MR Head No Cont (04.18.21 @ 10:07) >    EXAM:  MR ANGIO NECK                          EXAM:  MR ANGIO BRAIN                          EXAM:  MR BRAIN                            PROCEDURE DATE:  04/18/2021            INTERPRETATION:  CLINICAL INDICATIONS: ro stroke    COMPARISON: None.    TECHNIQUE: MRI brain: Multiplanar, multisequence MR imaging of the brain are obtained without the administration of intravenous Gadavist contrast.    FINDINGS:  There is no abnormal restricted diffusion to suggest acute infarction. Scattered periventricular and subcortical white matter T2 /FLAIR hyperintensities are seen without mass effect, nonspecific, likely representing moderate chronic microvascular changes.    Normal T2 flow-voids are seen within  the intracranial vasculature. The lateral ventricles and cortical sulci are normal in size and configuration. There is no mass, mass effect, or extra-axial fluid collection. There is no susceptibility artifact to suggest hemorrhage. Midline structures are normal.  The visualized paranasal sinuses, mastoid air cells and orbits are normal. Mild polypoid inflammatory mucosal changes are seen throughout the various portions of the paranasal sinuses. Status post right ocular lens replacement. The orbits and mastoid air cells are otherwise unremarkable.          ======================      CLINICAL INDICATIONS:ro stroke    TECHNIQUE: MRA brain. 3-D time of flight imaging with 2D MIP reconstructions.    COMPARISON: None    FINDINGS:  Fetal origin to the right posterior cerebral artery. Left posterior communicating arteries patent.  The [place] and vertebrobasilar system are normal without evidence of stenosis, occlusion or saccular aneurysm dilation. No evidence for arterial venous malformation. The vertebral arteries are codominant.        =====================      CLINICAL INDICATIONS: ro stroke    TECHNIQUE: Noncontrast MRA neck: 3-D time of flight imaging with 2D MIP reconstructions.    COMPARISON: None    FINDINGS: Limited study secondary lack of IV contrast. Possible severe stenosis in the proximal right internal carotid artery.  A left-sided aortic arch is demonstrated. There is normal relationship to the great vessels. The common carotid arteries, internal carotid arteries and vertebral shows no other evidence of significant stenosis, occlusion or saccular aneurysm dilation. The vertebral arteries are codominant.      IMPRESSION:    MRI BRAIN: No acute infarction    MRA BRAIN BRAIN: No large vessel occlusion.    MRA NECK: Limited study secondary to lack of IV contrast. Possible severe stenosis in the proximal right internal carotid artery. Consider postcontrast images or ultrasound for further evaluation                  DEMETRIUS JERONIMO MD; Attending Radiologist  This document has been electronically signed. Apr 18 2021 10:28AM    < end of copied text >    
Neurology Progress Note    S: Patient seen and examined. No new events overnight. patient denied CP, SOB, HA or pain. seen by nsx to workup ICA stenosis --> CTA H/N this AM    Medication:  MEDICATIONS  (STANDING):  aspirin enteric coated 81 milliGRAM(s) Oral daily  atorvastatin 40 milliGRAM(s) Oral at bedtime  dextrose 40% Gel 15 Gram(s) Oral once  dextrose 5%. 1000 milliLiter(s) (50 mL/Hr) IV Continuous <Continuous>  dextrose 5%. 1000 milliLiter(s) (100 mL/Hr) IV Continuous <Continuous>  dextrose 50% Injectable 25 Gram(s) IV Push once  dextrose 50% Injectable 12.5 Gram(s) IV Push once  dextrose 50% Injectable 25 Gram(s) IV Push once  glucagon  Injectable 1 milliGRAM(s) IntraMuscular once  heparin   Injectable 5000 Unit(s) SubCutaneous every 12 hours  insulin lispro (ADMELOG) corrective regimen sliding scale   SubCutaneous three times a day before meals  metoprolol succinate ER 50 milliGRAM(s) Oral daily  pantoprazole    Tablet 40 milliGRAM(s) Oral before breakfast  PARoxetine 20 milliGRAM(s) Oral daily  predniSONE   Tablet 2.5 milliGRAM(s) Oral daily  valsartan 80 milliGRAM(s) Oral daily    MEDICATIONS  (PRN):  acetaminophen   Tablet .. 650 milliGRAM(s) Oral every 6 hours PRN Mild Pain (1 - 3)  zolpidem 5 milliGRAM(s) Oral at bedtime PRN Insomnia        Vitals:  Vital Signs Last 24 Hrs  T(C): 36.3 (19 Apr 2021 04:26), Max: 37.2 (18 Apr 2021 12:21)  T(F): 97.4 (19 Apr 2021 04:26), Max: 99 (18 Apr 2021 12:21)  HR: 65 (19 Apr 2021 04:26) (56 - 65)  BP: 152/79 (19 Apr 2021 04:26) (113/68 - 157/80)  BP(mean): --  RR: 18 (19 Apr 2021 04:26) (18 - 18)  SpO2: 97% (19 Apr 2021 04:26) (94% - 97%)    Orthostatic VS    04-17-21 @ 17:05  Lying BP: 122/60 HR: 60   Sitting BP: 135/70 HR: 62  Standing BP: 143/80 HR: 65  Site: --   Mode: --    04-16-21 @ 12:52  Lying BP: 120/76 HR: 62   Sitting BP: 166/82 HR: 63  Standing BP: 155/83 HR: 98  Site: --   Mode: --      General Exam:   General Appearance: Appropriately dressed and in no acute distress       Head: Normocephalic, atraumatic and no dysmorphic features  Ear, Nose, and Throat: Moist mucous membranes  CVS: S1S2+  Resp: No SOB, no wheeze or rhonchi  GI: soft NT/ND  Extremities: No edema or cyanosis  Skin: No bruises or rashes     Neurological Exam:  Mental Status: Awake, alert and oriented x 3.  Able to follow simple and complex verbal commands. Able to name and repeat. fluent speech. No obvious aphasia or dysarthria noted.   Cranial Nerves: PERRL, EOMI, VFFC, sensation V1-V3 intact,  no obvious facial asymmetry, equal elevation of palate, scm/trap 5/5, tongue is midline on protrusion. no obvious papilledema on fundoscopic exam. hearing is grossly intact.   Motor: Normal bulk, tone and strength throughout. Fine finger movements were intact and symmetric. no tremors or drift noted.    Sensation: Intact to light touch and pinprick throughout. no right/left confusion. no extinction to tactile on DSS. Romberg was negative.   Reflexes: 1+ throughout at biceps, brachioradialis, triceps, patellars and ankles bilaterally and equal. No clonus. R toe and L toe were both downgoing.  Coordination: No dysmetria on FNF or HKS  Gait: able to ambulate but feels dizzy    I personally reviewed the below data/images/labs:    CBC Full  -  ( 18 Apr 2021 06:16 )  WBC Count : 6.18 K/uL  RBC Count : 4.03 M/uL  Hemoglobin : 10.9 g/dL  Hematocrit : 35.3 %  Platelet Count - Automated : 192 K/uL  Mean Cell Volume : 87.6 fl  Mean Cell Hemoglobin : 27.0 pg  Mean Cell Hemoglobin Concentration : 30.9 gm/dL  Auto Neutrophil # : x  Auto Lymphocyte # : x  Auto Monocyte # : x  Auto Eosinophil # : x  Auto Basophil # : x  Auto Neutrophil % : x  Auto Lymphocyte % : x  Auto Monocyte % : x  Auto Eosinophil % : x  Auto Basophil % : x    04-18    139  |  104  |  23  ----------------------------<  89  3.9   |  24  |  1.04    Ca    9.7      18 Apr 2021 06:15        EXAM:  CT BRAIN                            PROCEDURE DATE:  04/16/2021            INTERPRETATION:  Clinical indications: Dizziness    Multiple axial sections were performed base skull to vertex without contrast enhancement. Coronal and sagittal reconstructions were performed as well.    This exam is compared with prior noncontrast head CT performed on January 12, 2009.    Increased parenchymal volume loss and chronic microvascular ischemic changes are identified    There is no evidence acute hemorrhage mass or mass effect seen.    Evaluation of the osseous structures with the appropriate window appears normal.    The visualized paranasal sinuses mastoid and middle ear regions appear clear.    IMPRESSION: No evidence of acute hemorrhage, mass or mass effect.                  AMIE ANDERS M.D., ATTENDING RADIOLOGIST  This document has been electronically signed. Apr 16 2021  4:34PM    < end of copied text >        Patient name: PERRY JACOBS  YOB: 1935   Age: 86 (F)   MR#: [id number]  Study Date: 4/16/2021  [facility]  Study quality: Technically difficult  Referring Physician: Keagan Marino MD  Blood Pressure: 154/94 mmHg  Height: 152 cm  Weight: 63 kg  BSA: 1.6 m2  ------------------------------------------------------------------------  PROCEDURE: Transthoracic echocardiogram with 2-D, M-Mode  and complete spectral and color flow Doppler.  INDICATION: Syncope and collapse (R55)  ------------------------------------------------------------------------  Dimensions:    Normal Values:  LA:     3.3    2.0 - 4.0 cm  Ao:     3.2    2.0 - 3.8 cm  SEPTUM: 1.1    0.6 - 1.2 cm  PWT:    0.8    0.6 - 1.1 cm  LVIDd:  3.8    3.0 - 5.6 cm  LVIDs:  2.7    1.8 - 4.0 cm  Derived variables:  LVMI: 69 g/m2  RWT: 0.42  EF (Visual Estimate): 65 %  Doppler Peak Velocity (m/sec): AoV=1.9 TV=2.5  ------------------------------------------------------------------------  Observations:  Mitral Valve: Normal mitral valve. Mitral annular  calcification.  Aortic Valve/Aorta: Calcified aortic valve with normal  opening. Mild aortic regurgitation.  Normal aortic root size.  Left Atrium: Normal left atrium.  Left Ventricle: Normal left ventricular internal  dimensions. Discrete upper septal hypertrophy.  Normal left ventricular systolic function. Probably no  segmental wall motion abnormalities.  Impaired LV-relaxation with normal filling pressure.  Right Heart: Normal right atrium. Normal right ventricular  size and systolic function.  Normal tricuspid valve. Mild tricuspid regurgitation.  Pulmonic valve not well visualized. Minimal pulmonic  regurgitation.  Pericardium/Pleura: Normal pericardium with no pericardial  effusion.  Hemodynamic: Estimated right atral pressure is normal.  No evidence of pulmonary hypertension.  No PFO seen with color Doppler.  ------------------------------------------------------------------------  Conclusions:  Normal left ventricular systolic function. Probably no  segmental wall motion abnormalities.  ------------------------------------------------------------------------  Confirmed on  4/16/2021 - 11:53:03 by Pilo Wallis MD, FASE  --------------------      < from: MR Head No Cont (04.18.21 @ 10:07) >    EXAM:  MR ANGIO NECK                          EXAM:  MR ANGIO BRAIN                          EXAM:  MR BRAIN                            PROCEDURE DATE:  04/18/2021            INTERPRETATION:  CLINICAL INDICATIONS: ro stroke    COMPARISON: None.    TECHNIQUE: MRI brain: Multiplanar, multisequence MR imaging of the brain are obtained without the administration of intravenous Gadavist contrast.    FINDINGS:  There is no abnormal restricted diffusion to suggest acute infarction. Scattered periventricular and subcortical white matter T2 /FLAIR hyperintensities are seen without mass effect, nonspecific, likely representing moderate chronic microvascular changes.    Normal T2 flow-voids are seen within  the intracranial vasculature. The lateral ventricles and cortical sulci are normal in size and configuration. There is no mass, mass effect, or extra-axial fluid collection. There is no susceptibility artifact to suggest hemorrhage. Midline structures are normal.  The visualized paranasal sinuses, mastoid air cells and orbits are normal. Mild polypoid inflammatory mucosal changes are seen throughout the various portions of the paranasal sinuses. Status post right ocular lens replacement. The orbits and mastoid air cells are otherwise unremarkable.          ======================      CLINICAL INDICATIONS:ro stroke    TECHNIQUE: MRA brain. 3-D time of flight imaging with 2D MIP reconstructions.    COMPARISON: None    FINDINGS:  Fetal origin to the right posterior cerebral artery. Left posterior communicating arteries patent.  The [place] and vertebrobasilar system are normal without evidence of stenosis, occlusion or saccular aneurysm dilation. No evidence for arterial venous malformation. The vertebral arteries are codominant.        =====================      CLINICAL INDICATIONS: ro stroke    TECHNIQUE: Noncontrast MRA neck: 3-D time of flight imaging with 2D MIP reconstructions.    COMPARISON: None    FINDINGS: Limited study secondary lack of IV contrast. Possible severe stenosis in the proximal right internal carotid artery.  A left-sided aortic arch is demonstrated. There is normal relationship to the great vessels. The common carotid arteries, internal carotid arteries and vertebral shows no other evidence of significant stenosis, occlusion or saccular aneurysm dilation. The vertebral arteries are codominant.      IMPRESSION:    MRI BRAIN: No acute infarction    MRA BRAIN BRAIN: No large vessel occlusion.    MRA NECK: Limited study secondary to lack of IV contrast. Possible severe stenosis in the proximal right internal carotid artery. Consider postcontrast images or ultrasound for further evaluation                  DEMERTIUS JERONIMO MD; Attending Radiologist  This document has been electronically signed. Apr 18 2021 10:28AM    < end of copied text >        < from: CT Angio Neck w/ IV Cont (04.19.21 @ 08:29) >    EXAM:  CT ANGIO NECK (W)AW IC                          EXAM:  CT ANGIO BRAIN (W)AW IC                            PROCEDURE DATE:  04/19/2021            INTERPRETATION:  CT angiography of the brain and neck    CLINICAL INDICATION: poor quality MRI/MRA- lightheadedness. r/o stenosis    TECHNIQUE: Direct axial CT scanning of the brain and neck was obtained from the vertex to the level of the clavicular heads after the dynamic intravenous injection of 70 cc of Omnipaque 350. 30 cc were discarded. Sagittal and coronal maximum intensity projection reformats were provided.  Three-dimensional reconstructions were performed by the radiologist using the [employer] workstation.    Additionally, noncontrast axial CT scanning of the brain was obtained from the skull base to the vertex. Sagittal and coronal reformats were provided.    COMPARISON: MRI brain and MRA head and neck 4/18/2021.    FINDINGS:    CT brain:    No hydrocephalus, mass effect, midline shift, acute intracranial hemorrhage, or brain edema. Moderate white matter microvascular ischemic disease.    Polyp/retention cyst in the left maxillary sinus, otherwise the paranasal sinuses and mastoid air cells are clear.    CTA neck:    Origins of the bilateral common and internal carotid arteries are unremarkable.  Normal flow-related enhancement of the bilateral common and internal carotid arteries.    Origins of the bilateral vertebral arteries are unremarkable. Normal flow-related enhancement of the bilateral vertebral arteries.    No flow-limiting stenosis, evidence for arterial dissection, or vascular aneurysm.    Several low density lesions in the right thyroid lobe, one with associated calcific focus. Visualized lung apices are grossly clear    CTA brain:    Normal flow-related enhancement of the skull base/intracranial internal carotid arteries.    Normal flow-related enhancement of the bilateral anterior, middle, and posterior cerebral arteries.    The right PCA demonstrates a fetal origin. Left posterior communicatingartery is present.    Normal flow-related enhancement of the bilateral intradural vertebral arteries and the basilar artery.    The basilar artery is small in caliber due to the presence of a right fetal PCA and left posterior communicating artery. The right vertebral artery is diminished in caliber distal to the takeoff of the right PICA.    IMPRESSION:    CT brain:  No hydrocephalus, acute intracranial hemorrhage, mass effect, or brain edema.    CTA neck:  No flow-limiting stenosis.  No evidence for arterial dissection.    CTA brain:  No flow-limiting stenosis.  No vascular aneurysm.  No AVM.  Anatomic variants as described.          No flow-limiting stenosis or vascular aneurysm. No AVM.                    NY RUFFIN MD; Attending Radiologist  This document has been electronically signed. Apr 19 2021  9:11AM    < end of copied text >  
Patient seen and examined at bedside.    --Anticoagulation--  aspirin enteric coated 81 milliGRAM(s) Oral daily  heparin   Injectable 5000 Unit(s) SubCutaneous every 12 hours    T(C): 36.3 (04-19-21 @ 04:26), Max: 37.2 (04-18-21 @ 12:21)  HR: 65 (04-19-21 @ 04:26) (56 - 65)  BP: 152/79 (04-19-21 @ 04:26) (113/68 - 157/80)  RR: 18 (04-19-21 @ 04:26) (18 - 18)  SpO2: 97% (04-19-21 @ 04:26) (94% - 97%)  Wt(kg): --    Exam:  Intact    Imaging:   no new imaging    Labs:                        10.9   6.18  )-----------( 192      ( 18 Apr 2021 06:16 )             35.3     04-18    139  |  104  |  23  ----------------------------<  89  3.9   |  24  |  1.04    Ca    9.7      18 Apr 2021 06:15    
DATE OF SERVICE: 04-17-21 @ 10:40  CHIEF COMPLAINT:Patient is a 86y old  Female who presents with a chief complaint of   	        PAST MEDICAL & SURGICAL HISTORY:  HTN (hypertension)    ARF (acute renal failure)  with glomerulonephritis    Anemia    Ectopic fetus    Sarcoidosis of lung    HLD (hyperlipidemia)    Hernia  as a child    Mass  calcified mass left hip area, 2011    Murmur    CVA (cerebral vascular accident)    TIA (transient ischemic attack)    S/P appendectomy    S/P hysterectomy  with BLSO    S/P knee surgery  for right torn meniscual    S/P cholecystectomy  11/2015    H/O cataract extraction, right  with laser revision 3/2016            REVIEW OF SYSTEMS:  CONSTITUTIONAL: No fever, weight loss, or fatigue  EYES: No eye pain, visual disturbances, or discharge  NECK: No pain or stiffness  RESPIRATORY: No cough, wheezing, chills or hemoptysis; No Shortness of Breath  CARDIOVASCULAR: No chest pain, palpitations, passing out,   dizziness much better  GASTROINTESTINAL: No abdominal or epigastric pain. No nausea, vomiting, or hematemesis; No diarrhea or constipation. No melena or hematochezia.  GENITOURINARY: No dysuria, frequency, hematuria, or incontinence  NEUROLOGICAL: No headaches,     Medications:  MEDICATIONS  (STANDING):  aspirin enteric coated 81 milliGRAM(s) Oral daily  atorvastatin 20 milliGRAM(s) Oral at bedtime  dextrose 40% Gel 15 Gram(s) Oral once  dextrose 5%. 1000 milliLiter(s) (50 mL/Hr) IV Continuous <Continuous>  dextrose 5%. 1000 milliLiter(s) (100 mL/Hr) IV Continuous <Continuous>  dextrose 50% Injectable 25 Gram(s) IV Push once  dextrose 50% Injectable 12.5 Gram(s) IV Push once  dextrose 50% Injectable 25 Gram(s) IV Push once  glucagon  Injectable 1 milliGRAM(s) IntraMuscular once  heparin   Injectable 5000 Unit(s) SubCutaneous every 12 hours  insulin lispro (ADMELOG) corrective regimen sliding scale   SubCutaneous three times a day before meals  metoprolol succinate ER 50 milliGRAM(s) Oral daily  pantoprazole    Tablet 40 milliGRAM(s) Oral before breakfast  PARoxetine 20 milliGRAM(s) Oral daily  predniSONE   Tablet 2.5 milliGRAM(s) Oral daily  valsartan 80 milliGRAM(s) Oral daily    MEDICATIONS  (PRN):  acetaminophen   Tablet .. 650 milliGRAM(s) Oral every 6 hours PRN Mild Pain (1 - 3)  zolpidem 5 milliGRAM(s) Oral at bedtime PRN Insomnia    	    PHYSICAL EXAM:  T(C): 36.7 (04-17-21 @ 05:00), Max: 36.7 (04-16-21 @ 12:52)  HR: 60 (04-17-21 @ 05:00) (60 - 62)  BP: 169/80 (04-17-21 @ 05:00) (112/71 - 169/80)  RR: 18 (04-17-21 @ 05:00) (18 - 18)  SpO2: 94% (04-17-21 @ 05:00) (94% - 96%)  Wt(kg): --  I&O's Summary    16 Apr 2021 07:01  -  17 Apr 2021 07:00  --------------------------------------------------------  IN: 300 mL / OUT: 650 mL / NET: -350 mL    17 Apr 2021 07:01  -  17 Apr 2021 10:40  --------------------------------------------------------  IN: 240 mL / OUT: 0 mL / NET: 240 mL        Appearance: Normal	  HEENT:   Normal oral mucosa, PERRL, EOMI	  Lymphatic: No lymphadenopathy  Cardiovascular: Normal S1 S2, No JVD, No murmurs, No edema  Respiratory: Lungs clear to auscultation	  Psychiatry: A & O  Gastrointestinal:  Soft, Non-tender, + BS	  Skin: No rashes, No ecchymoses, No cyanosis	  Neurologic: Non-focal  Extremities: Normal range of motion, No clubbing, cyanosis or edema  Vascular: Peripheral pulses palpable 2+ bilaterally    TELEMETRY: 	    ECG:  	  RADIOLOGY:  OTHER: 	  	  LABS:	 	    CARDIAC MARKERS:                                11.4   6.13  )-----------( 221      ( 16 Apr 2021 06:55 )             37.1     04-16    141  |  102  |  23  ----------------------------<  87  3.5   |  29  |  0.93    Ca    9.8      16 Apr 2021 06:55    TPro  7.6  /  Alb  4.3  /  TBili  0.2  /  DBili  x   /  AST  34  /  ALT  19  /  AlkPhos  72  04-15    proBNP:   Lipid Profile:   HgA1c:   TSH:     	        
DATE OF SERVICE: 04-18-21 @ 06:39    Subjective: Patient seen and examined. No new events except as noted.     SUBJECTIVE/ROS:  feels better       MEDICATIONS:  MEDICATIONS  (STANDING):  aspirin enteric coated 81 milliGRAM(s) Oral daily  atorvastatin 20 milliGRAM(s) Oral at bedtime  dextrose 40% Gel 15 Gram(s) Oral once  dextrose 5%. 1000 milliLiter(s) (50 mL/Hr) IV Continuous <Continuous>  dextrose 5%. 1000 milliLiter(s) (100 mL/Hr) IV Continuous <Continuous>  dextrose 50% Injectable 25 Gram(s) IV Push once  dextrose 50% Injectable 12.5 Gram(s) IV Push once  dextrose 50% Injectable 25 Gram(s) IV Push once  glucagon  Injectable 1 milliGRAM(s) IntraMuscular once  heparin   Injectable 5000 Unit(s) SubCutaneous every 12 hours  insulin lispro (ADMELOG) corrective regimen sliding scale   SubCutaneous three times a day before meals  metoprolol succinate ER 50 milliGRAM(s) Oral daily  pantoprazole    Tablet 40 milliGRAM(s) Oral before breakfast  PARoxetine 20 milliGRAM(s) Oral daily  predniSONE   Tablet 2.5 milliGRAM(s) Oral daily  valsartan 80 milliGRAM(s) Oral daily      PHYSICAL EXAM:  T(C): 36.4 (04-18-21 @ 04:08), Max: 37.1 (04-17-21 @ 20:46)  HR: 59 (04-18-21 @ 04:08) (59 - 65)  BP: 136/71 (04-18-21 @ 04:08) (122/75 - 149/80)  RR: 18 (04-18-21 @ 04:08) (18 - 18)  SpO2: 94% (04-18-21 @ 04:08) (94% - 97%)  Wt(kg): --  I&O's Summary    16 Apr 2021 07:01  -  17 Apr 2021 07:00  --------------------------------------------------------  IN: 300 mL / OUT: 650 mL / NET: -350 mL    17 Apr 2021 07:01  -  18 Apr 2021 06:39  --------------------------------------------------------  IN: 420 mL / OUT: 750 mL / NET: -330 mL            JVP: Normal  Neck: supple  Lung: clear   CV: S1 S2 , Murmur:  Abd: soft  Ext: No edema  neuro: Awake / alert  Psych: flat affect  Skin: normal``    LABS/DATA:    CARDIAC MARKERS:                                10.9   6.18  )-----------( 192      ( 18 Apr 2021 06:16 )             35.3     04-16    141  |  102  |  23  ----------------------------<  87  3.5   |  29  |  0.93    Ca    9.8      16 Apr 2021 06:55      proBNP:   Lipid Profile:   HgA1c:   TSH:     TELE:  EKG:        
DATE OF SERVICE: 04-19-21 @ 10:09    Subjective: Patient seen and examined. No new events except as noted.     SUBJECTIVE/ROS:    no cp   no sob     MEDICATIONS:  MEDICATIONS  (STANDING):  aspirin enteric coated 81 milliGRAM(s) Oral daily  atorvastatin 40 milliGRAM(s) Oral at bedtime  dextrose 40% Gel 15 Gram(s) Oral once  dextrose 5%. 1000 milliLiter(s) (50 mL/Hr) IV Continuous <Continuous>  dextrose 5%. 1000 milliLiter(s) (100 mL/Hr) IV Continuous <Continuous>  dextrose 50% Injectable 25 Gram(s) IV Push once  dextrose 50% Injectable 12.5 Gram(s) IV Push once  dextrose 50% Injectable 25 Gram(s) IV Push once  glucagon  Injectable 1 milliGRAM(s) IntraMuscular once  heparin   Injectable 5000 Unit(s) SubCutaneous every 12 hours  insulin lispro (ADMELOG) corrective regimen sliding scale   SubCutaneous three times a day before meals  metoprolol succinate ER 50 milliGRAM(s) Oral daily  pantoprazole    Tablet 40 milliGRAM(s) Oral before breakfast  PARoxetine 20 milliGRAM(s) Oral daily  predniSONE   Tablet 2.5 milliGRAM(s) Oral daily  valsartan 80 milliGRAM(s) Oral daily      PHYSICAL EXAM:  T(C): 36.3 (04-19-21 @ 04:26), Max: 37.2 (04-18-21 @ 12:21)  HR: 65 (04-19-21 @ 04:26) (56 - 65)  BP: 152/79 (04-19-21 @ 04:26) (113/68 - 157/80)  RR: 18 (04-19-21 @ 04:26) (18 - 18)  SpO2: 97% (04-19-21 @ 04:26) (94% - 97%)  Wt(kg): --  I&O's Summary    18 Apr 2021 07:01  -  19 Apr 2021 07:00  --------------------------------------------------------  IN: 760 mL / OUT: 0 mL / NET: 760 mL    19 Apr 2021 07:01  -  19 Apr 2021 10:09  --------------------------------------------------------  IN: 200 mL / OUT: 200 mL / NET: 0 mL            JVP: Normal  Neck: supple  Lung: clear   CV: S1 S2 , Murmur:  Abd: soft  Ext: No edema  neuro: Awake / alert  Psych: flat affect  Skin: normal``    LABS/DATA:    CARDIAC MARKERS:                                10.9   6.18  )-----------( 192      ( 18 Apr 2021 06:16 )             35.3     04-18    139  |  104  |  23  ----------------------------<  89  3.9   |  24  |  1.04    Ca    9.7      18 Apr 2021 06:15      proBNP:   Lipid Profile:   HgA1c:   TSH:     TELE:  EKG:      r< from: CT Angio Neck w/ IV Cont (04.19.21 @ 08:29) >  IMPRESSION:    CT brain:  No hydrocephalus, acute intracranial hemorrhage, mass effect, or brain edema.    CTA neck:  No flow-limiting stenosis.  No evidence for arterial dissection.    CTA brain:  No flow-limiting stenosis.  No vascular aneurysm.  No AVM.  Anatomic variants as described.    < end of copied text >

## 2021-04-19 NOTE — PHYSICAL THERAPY INITIAL EVALUATION ADULT - PRECAUTIONS/LIMITATIONS, REHAB EVAL
MRI BRAIN: No acute infarction. MRA BRAIN BRAIN: No large vessel occlusion. MRA NECK: Limited study secondary to lack of IV contrast. Possible severe stenosis in the proximal right internal carotid artery./cardiac precautions

## 2021-04-19 NOTE — PROGRESS NOTE ADULT - ASSESSMENT
85YO F PMHx sarcoidosis on steroids CKD (on lasix), CVA/TIA (no residual deficits), HTN, presented to ED with substernal chest pain/MADRID/lightheadedness intermittently x1mo. Follows with Dr. Bala Snow (does not come here) neurologist, Donaldr saw in house. TTE and cardiac w/u unremarkable. MRI/MRA shows no DWI changes, chronic microvasc ischemic changes on FLAIR, and ?posible severe R ICA stenosis on poor quality MRA H/N, no intracranial vascular abnormalities. Exam: Neuro intact.   - Admitted to medicine service  - Needs CTA H/N and carotid dopplers for further eval - scheduled CTA today 8AM with premedication for allergy  - Please document medical clearance for possible nsgy intervention for carotid stenosis  - On ASA 81 for hx CVA  - continue q4h neuro checks and PT/OT per primary  
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  states worse sensation when standing and walking feels fine sitting down . she has ILR   Orthostatics obtained. HR goes from 62-->98 upon standing ?POTS, takes ambien at home, TTE neg, MRI brain without acute infarct, question of R ICA severe stenosis noted (poor study) A1c 6.3. , CTA H/N unremarkable w/o hemodynamically significant stenosis.   Etiology of Lightheadedness more cardiac in nature r/o infarct but lower suspicion. MRI neg for AIS  - c/w ASA 81mg daily. lipitor 40mg daily. LDL goal <70  - seen by nsx, requesting CTA--> unremarkable.   - cardiac workup in progress   - limit sedating meds, gets ambien at night.   - spoke with neurologist Dr. Bala Snow, does not come here  - telemetry  - PT/OT, OOBC  - check FS, glucose control <180  - GI/DVT ppx  - Counseling on diet, exercise, and medication adherence was done  - Counseling on smoking cessation and alcohol consumption offered when appropriate.  - Pain assessed and judicious use of narcotics when appropriate was discussed.    - Stroke education given when appropriate.  - Importance of fall prevention discussed.   - Differential diagnosis and plan of care discussed with patient and/or family and primary team  - Thank you for allowing me to participate in the care of this patient. Call with questions.   Diogenes Boyd MD  Vascular Neurology  Office: [phone] 
Dizziness  monitor on tele   Echo   Neurology eval  noted  mr s pending    HTN  adjust prn     Sarcoidosis   pulm eval    CVA  s/p ILR   no events     dm  hold metformin    dvt proph    gi proph   fsg riss  
Dizziness  monitor on tele   Echo   Neurology eval  noted  mr s w/r carotid artery stenosis  cards to call neurosx for eval    HTN  adjust prn     Sarcoidosis   pulm f/u    CVA  s/p ILR   no events     dm  hold metformin    dvt proph    gi proph   fsg riss  
Dizziness  no evidence of orthostatic hypotension   monitor on tele   Echo unremarkable   MRA ? carotid stenosis  CTA neg  fu with nsx  carotid doppler     HTN  stable     Sarcoidosis   pulm eval     CVA  s/p ILR   no events      Advanced care planning was discussed with patient and family.  Risks, benefits and alternatives of the cardiac treatments and medical therapy including procedures were discussed in detail and all questions were answered. Importance of compliance with medical therapy and lifestyle modification to improve cardiovascular health were addressed. Appropriate forms and patient educational materials were reviewed. 30 minutes face to face spent.  
Dizziness  no evidence of orthostatic hypotension   monitor on tele   Echo unremarkable   Neurology follow up   MRI / MRA    HTN  stable     Sarcoidosis   pulm eval     CVA  s/p ILR   no events      Advanced care planning was discussed with patient and family.  Risks, benefits and alternatives of the cardiac treatments and medical therapy including procedures were discussed in detail and all questions were answered. Importance of compliance with medical therapy and lifestyle modification to improve cardiovascular health were addressed. Appropriate forms and patient educational materials were reviewed. 30 minutes face to face spent.  
Dizziness  no evidence of orthostatic hypotension   monitor on tele   Echo unremarkable   Neurology follow up   MRI / MRA    HTN  stable     Sarcoidosis   pulm eval     CVA  s/p ILR   no events      Advanced care planning was discussed with patient and family.  Risks, benefits and alternatives of the cardiac treatments and medical therapy including procedures were discussed in detail and all questions were answered. Importance of compliance with medical therapy and lifestyle modification to improve cardiovascular health were addressed. Appropriate forms and patient educational materials were reviewed. 30 minutes face to face spent.  
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  states worse sensation when standing and walking feels fine sitting down . she has ILR   Orthostatics obtained. HR goes from 62-->98 upon standing ?POTS, takes ambien at home, TTE neg, MRI brain without acute infarct, question of R ICA severe stenosis noted (poor study) A1c 6.3.   Etiology of Lightheadedness more cardiac in nature r/o infarct but lower suspicion. MRI neg for AIS  - c/w ASA 81mg daily. lipitor would incrase to 40mg daily. LDL goal <70  - seen by nsx, requesting CTA.  can also get CD instead.    - cardiac workup in progress   - limit sedating meds, gets ambien at night.   - spoke with neurologist Dr. Bala Snow, does not come here  - telemetry  - PT/OT, OOBC  - check FS, glucose control <180  - GI/DVT ppx  - Counseling on diet, exercise, and medication adherence was done  - Counseling on smoking cessation and alcohol consumption offered when appropriate.  - Pain assessed and judicious use of narcotics when appropriate was discussed.    - Stroke education given when appropriate.  - Importance of fall prevention discussed.   - Differential diagnosis and plan of care discussed with patient and/or family and primary team  - Thank you for allowing me to participate in the care of this patient. Call with questions.   Diogenes Boyd MD  Vascular Neurology  Office: [phone] 
87 y/o F with PMH of sarcoidosis of lung (on prednisone qd for joints?), CKD, CVA, TIA, HTN, appendectomy, hyster. Presents to ED with c/o SOB, chest discomfort substernally, lightheadedness, worsening over the past few days. CT chest grossly unchanged from 2008. Currently with no c/o SOB - breathing comfortably on RA sats 98%.
Dizziness  monitor on tele   Echo   Neurology eval  noted  mr s w/r carotid artery stenosis but cta/and dopplers show no stenosis      HTN  adjust prn     Sarcoidosis   pulm f/u    CVA  s/p ILR   no events     dm  hold metformin/resume on d/c    dvt proph    gi proph   fsg riss      discussed case w/ pts pmd  rec inc prednisone as may be a result of adrenal insuff    d/c home today

## 2021-04-19 NOTE — DISCHARGE NOTE PROVIDER - NSDCMRMEDTOKEN_GEN_ALL_CORE_FT
aspirin 81 mg oral tablet: 1 tab(s) orally once a day  Diovan  mg-12.5 mg oral tablet: 1 tab(s) orally once a day  Lipitor 20 mg oral tablet: 1 tab(s) orally once a day (at bedtime)  metFORMIN 500 mg oral tablet, extended release: 1 tab(s) orally once a day. Please resume on 4/21 given the recent IV Contrast use   metoprolol succinate 50 mg oral tablet, extended release: 1 tab(s) orally once a day  Norvasc 5 mg oral tablet: 1 tab(s) orally once a day  Paxil 20 mg oral tablet: 1 tab(s) orally once a day  predniSONE 2.5 mg oral tablet: 1 tab(s) orally once a day  traMADol 50 mg oral tablet: 1 tab(s) orally every 6 hours, As Needed   acetaminophen 325 mg oral tablet: 2 tab(s) orally every 6 hours, As needed, Mild Pain (1 - 3)  aspirin 81 mg oral tablet: 1 tab(s) orally once a day  atorvastatin 40 mg oral tablet: 1 tab(s) orally once a day (at bedtime)  Diovan  mg-12.5 mg oral tablet: 1 tab(s) orally once a day  metFORMIN 500 mg oral tablet, extended release: 1 tab(s) orally once a day. Please resume on 4/21 given the recent IV Contrast use   metoprolol succinate 50 mg oral tablet, extended release: 1 tab(s) orally once a day  pantoprazole 40 mg oral delayed release tablet: 1 tab(s) orally once a day (before a meal)  Paxil 20 mg oral tablet: 1 tab(s) orally once a day  predniSONE 5 mg oral tablet: 1 tab(s) orally once a day  Rolling Walker: Rolling Walker  ICD 10 #: R26.81  TARAN 99  traMADol 50 mg oral tablet: 1 tab(s) orally every 6 hours, As Needed

## 2021-04-19 NOTE — DISCHARGE NOTE PROVIDER - HOSPITAL COURSE
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 Seen by Pulm, possible ?sarcoidosis exacerbation. However, SOB spontaneously resolved. Prednisone increased to 5mg qd. Orthostatics obtained. HR goes from 62-->98 upon standing ?POTS, takes ambien at home, TTE neg, MRI brain without acute infarct, question of R ICA severe stenosis noted (poor study) A1c 6.3. , CTA H/N unremarkable w/o hemodynamically significant stenosis. As per Neuro, etiology of Lightheadedness more cardiac in nature r/o infarct but lower suspicion. MRI neg for AIS. Seen by PT and OT. Patient remain stable for DC with close follow up with PCP and Neuro as per Dr. 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 Seen by Pulm, possible ?sarcoidosis exacerbation. However, SOB spontaneously resolved. Prednisone increased to 5mg qd. Orthostatics obtained. HR goes from 62-->98 upon standing ?POTS, takes ambien at home, TTE neg, MRI brain without acute infarct, question of R ICA severe stenosis noted (poor study) A1c 6.3. , CTA H/N and Carotid US unremarkable w/o hemodynamically significant stenosis. As per Neuro, etiology of Lightheadedness more cardiac in nature r/o infarct but lower suspicion. MRI neg for AIS. Seen by PT and OT. Patient remain stable for DC with close follow up with PCP and Neuro as per Dr. Marino

## 2021-04-19 NOTE — PHYSICAL THERAPY INITIAL EVALUATION ADULT - PERTINENT HX OF CURRENT PROBLEM, REHAB EVAL
86 year old F with pmhx of sarcoidosis of lung, kidney failure, on lasix, CVA, TIA, HTN and pshx of appendectomy, hysterectomy presents to ED as referral from cardiologist s/o SOB and room spinning x1 month. Feels SOB even when at rest and feels as if she is wheezing.

## 2021-04-19 NOTE — PHYSICAL THERAPY INITIAL EVALUATION ADULT - ADDITIONAL COMMENTS
Pt lives in a basement apt w/ 7 steps at entry +BHR, pt was independent w/ all ADLs and functional mobility at baseline ambulates w/ a SC.

## 2021-04-19 NOTE — PROGRESS NOTE ADULT - PROBLEM SELECTOR PLAN 1
-Normoxic  -Doubt underlying sarcoidosis exacerbated at this time.  -Has mild MADRID at baseline. Breathing feels at baseline at this time.

## 2021-04-19 NOTE — DISCHARGE NOTE NURSING/CASE MANAGEMENT/SOCIAL WORK - PATIENT PORTAL LINK FT
You can access the FollowMyHealth Patient Portal offered by [facility] by registering at the following website: [email or web address]. By joining [facility]’s FollowMyHealth portal, you will also be able to view your health information using other applications (apps) compatible with our system.

## 2021-04-19 NOTE — CHART NOTE - NSCHARTNOTEFT_GEN_A_CORE
CTA H/N reviewed, no intracranial or proximal carotid stenosis appreciated.     No indication for further nsgy consult at this time. Will sign off. Please reconsult as needed.

## 2021-04-27 ENCOUNTER — OUTPATIENT (OUTPATIENT)
Dept: OUTPATIENT SERVICES | Facility: HOSPITAL | Age: 86
LOS: 1 days | End: 2021-04-27

## 2021-04-27 DIAGNOSIS — Z90.49 ACQUIRED ABSENCE OF OTHER SPECIFIED PARTS OF DIGESTIVE TRACT: Chronic | ICD-10-CM

## 2021-04-27 DIAGNOSIS — R07.9 CHEST PAIN, UNSPECIFIED: ICD-10-CM

## 2021-04-27 DIAGNOSIS — Z98.89 OTHER SPECIFIED POSTPROCEDURAL STATES: Chronic | ICD-10-CM

## 2021-05-10 ENCOUNTER — APPOINTMENT (OUTPATIENT)
Dept: CV DIAGNOSTICS | Facility: HOSPITAL | Age: 86
End: 2021-05-10

## 2021-06-15 ENCOUNTER — OUTPATIENT (OUTPATIENT)
Dept: OUTPATIENT SERVICES | Facility: HOSPITAL | Age: 86
LOS: 1 days | End: 2021-06-15

## 2021-06-15 ENCOUNTER — APPOINTMENT (OUTPATIENT)
Dept: CV DIAGNOSTICS | Facility: HOSPITAL | Age: 86
End: 2021-06-15
Payer: MEDICARE

## 2021-06-15 DIAGNOSIS — Z98.89 OTHER SPECIFIED POSTPROCEDURAL STATES: Chronic | ICD-10-CM

## 2021-06-15 DIAGNOSIS — R06.02 SHORTNESS OF BREATH: ICD-10-CM

## 2021-06-15 DIAGNOSIS — Z90.49 ACQUIRED ABSENCE OF OTHER SPECIFIED PARTS OF DIGESTIVE TRACT: Chronic | ICD-10-CM

## 2021-06-15 PROCEDURE — 93016 CV STRESS TEST SUPVJ ONLY: CPT | Mod: GC

## 2021-06-15 PROCEDURE — 93018 CV STRESS TEST I&R ONLY: CPT | Mod: GC

## 2021-06-15 PROCEDURE — 78452 HT MUSCLE IMAGE SPECT MULT: CPT | Mod: 26

## 2021-07-19 NOTE — ED ADULT NURSE NOTE - AGENT'S NAME
Mom reports fever for a few days. Comes down with tylenol. Reports a slight dry cough. Runny nose. Good appetite and wetting diapers. Javi King

## 2021-10-12 ENCOUNTER — EMERGENCY (EMERGENCY)
Facility: HOSPITAL | Age: 86
LOS: 1 days | Discharge: HOME | End: 2021-10-12
Attending: EMERGENCY MEDICINE
Payer: MEDICARE

## 2021-10-12 VITALS
DIASTOLIC BLOOD PRESSURE: 75 MMHG | WEIGHT: 139.99 LBS | SYSTOLIC BLOOD PRESSURE: 146 MMHG | RESPIRATION RATE: 18 BRPM | HEART RATE: 89 BPM | TEMPERATURE: 98 F | HEIGHT: 60.4 IN | OXYGEN SATURATION: 98 %

## 2021-10-12 DIAGNOSIS — Z98.89 OTHER SPECIFIED POSTPROCEDURAL STATES: Chronic | ICD-10-CM

## 2021-10-12 DIAGNOSIS — Z90.49 ACQUIRED ABSENCE OF OTHER SPECIFIED PARTS OF DIGESTIVE TRACT: Chronic | ICD-10-CM

## 2021-10-12 LAB
ALBUMIN SERPL ELPH-MCNC: 4.3 G/DL — SIGNIFICANT CHANGE UP (ref 3.3–5)
ALP SERPL-CCNC: 75 U/L — SIGNIFICANT CHANGE UP (ref 40–120)
ALT FLD-CCNC: 28 U/L — SIGNIFICANT CHANGE UP (ref 10–45)
ANION GAP SERPL CALC-SCNC: 13 MMOL/L — SIGNIFICANT CHANGE UP (ref 5–17)
AST SERPL-CCNC: 33 U/L — SIGNIFICANT CHANGE UP (ref 10–40)
BASOPHILS # BLD AUTO: 0.02 K/UL — SIGNIFICANT CHANGE UP (ref 0–0.2)
BASOPHILS NFR BLD AUTO: 0.3 % — SIGNIFICANT CHANGE UP (ref 0–2)
BILIRUB SERPL-MCNC: 0.3 MG/DL — SIGNIFICANT CHANGE UP (ref 0.2–1.2)
BUN SERPL-MCNC: 23 MG/DL — SIGNIFICANT CHANGE UP (ref 7–23)
CALCIUM SERPL-MCNC: 9.8 MG/DL — SIGNIFICANT CHANGE UP (ref 8.4–10.5)
CHLORIDE SERPL-SCNC: 92 MMOL/L — LOW (ref 96–108)
CO2 SERPL-SCNC: 24 MMOL/L — SIGNIFICANT CHANGE UP (ref 22–31)
CREAT SERPL-MCNC: 1.13 MG/DL — SIGNIFICANT CHANGE UP (ref 0.5–1.3)
EOSINOPHIL # BLD AUTO: 0.16 K/UL — SIGNIFICANT CHANGE UP (ref 0–0.5)
EOSINOPHIL NFR BLD AUTO: 2.1 % — SIGNIFICANT CHANGE UP (ref 0–6)
GLUCOSE SERPL-MCNC: 247 MG/DL — HIGH (ref 70–99)
HCT VFR BLD CALC: 37.2 % — SIGNIFICANT CHANGE UP (ref 34.5–45)
HGB BLD-MCNC: 11.5 G/DL — SIGNIFICANT CHANGE UP (ref 11.5–15.5)
IMM GRANULOCYTES NFR BLD AUTO: 0.3 % — SIGNIFICANT CHANGE UP (ref 0–1.5)
LYMPHOCYTES # BLD AUTO: 0.67 K/UL — LOW (ref 1–3.3)
LYMPHOCYTES # BLD AUTO: 8.9 % — LOW (ref 13–44)
MCHC RBC-ENTMCNC: 26.6 PG — LOW (ref 27–34)
MCHC RBC-ENTMCNC: 30.9 GM/DL — LOW (ref 32–36)
MCV RBC AUTO: 85.9 FL — SIGNIFICANT CHANGE UP (ref 80–100)
MONOCYTES # BLD AUTO: 0.43 K/UL — SIGNIFICANT CHANGE UP (ref 0–0.9)
MONOCYTES NFR BLD AUTO: 5.7 % — SIGNIFICANT CHANGE UP (ref 2–14)
NEUTROPHILS # BLD AUTO: 6.27 K/UL — SIGNIFICANT CHANGE UP (ref 1.8–7.4)
NEUTROPHILS NFR BLD AUTO: 82.7 % — HIGH (ref 43–77)
NRBC # BLD: 0 /100 WBCS — SIGNIFICANT CHANGE UP (ref 0–0)
PLATELET # BLD AUTO: 214 K/UL — SIGNIFICANT CHANGE UP (ref 150–400)
POTASSIUM SERPL-MCNC: 4 MMOL/L — SIGNIFICANT CHANGE UP (ref 3.5–5.3)
POTASSIUM SERPL-SCNC: 4 MMOL/L — SIGNIFICANT CHANGE UP (ref 3.5–5.3)
PROT SERPL-MCNC: 7.5 G/DL — SIGNIFICANT CHANGE UP (ref 6–8.3)
RBC # BLD: 4.33 M/UL — SIGNIFICANT CHANGE UP (ref 3.8–5.2)
RBC # FLD: 14.3 % — SIGNIFICANT CHANGE UP (ref 10.3–14.5)
SODIUM SERPL-SCNC: 129 MMOL/L — LOW (ref 135–145)
TROPONIN T, HIGH SENSITIVITY RESULT: 11 NG/L — SIGNIFICANT CHANGE UP (ref 0–51)
WBC # BLD: 7.57 K/UL — SIGNIFICANT CHANGE UP (ref 3.8–10.5)
WBC # FLD AUTO: 7.57 K/UL — SIGNIFICANT CHANGE UP (ref 3.8–10.5)

## 2021-10-12 PROCEDURE — 71046 X-RAY EXAM CHEST 2 VIEWS: CPT | Mod: 26

## 2021-10-12 PROCEDURE — 99284 EMERGENCY DEPT VISIT MOD MDM: CPT

## 2021-10-12 RX ADMIN — Medication 30 MILLILITER(S): at 21:30

## 2021-10-12 NOTE — ED ADULT TRIAGE NOTE - CHIEF COMPLAINT QUOTE
burning chest pain that started 1 hour ago, pt reports eating spicy food for dinner, choked and coughed, had a cold sweat & had to lay down

## 2021-10-12 NOTE — ED PROVIDER NOTE - OBJECTIVE STATEMENT
86 year old woman with PMH sarcoidosis on prednisone, CKD, prior CVA, HTN, gout, GERD presenting with complaint of chest and epigastric pain. Patient reports pain began around 2000 today after eating a spicy meal of  food. States has history of GERD but this was more severe and lasted for around an hour. States was burning in sensation. +nausea. No emesis, no fever, no cough, no diaphoresis. States SOB unchanged from baseline. Has history of CVA several years ago with no residual deficits and states has loop recorder. Reports symptoms improved with water and milk and with maalox in ED.

## 2021-10-12 NOTE — ED PROVIDER NOTE - CARE PROVIDER_API CALL
Emmy Paredes (DO)  Family Medicine  [address], [address]  [place] [address]  Phone: [phone]  Fax: [phone]  Established Patient  Follow Up Time: 7-10 Days

## 2021-10-12 NOTE — ED PROVIDER NOTE - CARE PLAN
Principal Discharge DX:	Chest pain   1 Principal Discharge DX:	Chest pain  Secondary Diagnosis:	GERD (gastroesophageal reflux disease)

## 2021-10-12 NOTE — ED PROVIDER NOTE - NSFOLLOWUPINSTRUCTIONS_ED_ALL_ED_FT
You likely had an exacerbation of your GERD.     Please follow up with your PCP and cardiologist after discharge.    If you develop new crushing chest pain, shortness of breath, excessive sweating and fatigue, and/or radiating pain down your arm, please seek emergent medical care. You likely had an exacerbation of your GERD. You have a history of GERD that can be exacerbated by certain foods. In this case it was likely from your eating spicy foods. Monitor your diet. Continue to take your protonix (pantoprazole) daily about 30 minutes before breakfast every day.    Please follow up with your PCP and cardiologist after discharge.    If you develop new crushing chest pain, shortness of breath, excessive sweating and fatigue, and/or radiating pain down your arm, please seek emergent medical care.

## 2021-10-12 NOTE — ED PROVIDER NOTE - ATTENDING CONTRIBUTION TO CARE
I performed a history and physical exam of the patient and discussed their management with the resident and /or advanced care provider. I reviewed the resident and /or ACP's note and agree with the documented findings and plan of care. My medical decision making and observations are found above.  Lungs clear, abd soft. (pain gone now)

## 2021-10-12 NOTE — ED ADULT NURSE NOTE - OBJECTIVE STATEMENT
Pt 86 year old female, A/O x3. Pt came in due to midsternal chest pain after eating spicy food. PMH- Heart murmur, HTN, DM. Pt states at 2000 was eating spicy food when she started to cough and had steady stabbing chest pain. Upon assessment, pt states chest pain resolved after Maalox in triage. Skin- warm, dry, intact. Abd. soft, nontender, nondistended. Denies SOB, fever, chills, N/V/D, dysuria, numbness/tingling. Interventions- side rails up, call bell at bedside, blankets provided.

## 2021-10-12 NOTE — ED PROVIDER NOTE - PHYSICAL EXAMINATION
General: Awake, alert, in no acute distress  HEENT: Normocephalic, atraumatic. EOMI. Moist mucus membranes.  Pulmonary: Symmetric chest rise. No extra work of breathing. Lungs clear to auscultation bilaterally.  Cardiovascular: Normal rate and regular rhythm. S1/S2  Abdominal: Soft, non-distended, non-tender  Skin: No evident rashes or lesions  Neurologic: No focal defects  Psychiatric: Mood appropriate

## 2021-10-12 NOTE — ED PROVIDER NOTE - NS ED ROS FT
CONSTITUTIONAL: Negative for fever, chills, fatigue, recent weight changes  ENT/MOUTH: Negative for hearing difficulties, ear pain, sore throat, rhinorrhea  EYES: Negative for eye pain, vision changes  CARDIOVASCULAR: As per HPI  RESPIRATORY: Negative for respiratory distress, cough. Positive for SOB  GASTROINTESTINAL: Negative for vomiting, diarrhea, constipation. +nausea  GENITOURINARY: Negative for dysuria, decreased urinary frequency, hematuria  MUSCULOSKELETAL: Negative for joint pain, back pain, arthralgias, myalgias  SKIN: Negative for skin lesions, rashes, hair changes  NEUROLOGICAL: Negative for headache, weakness, numbness, paresthesias  PSYCHIATRIC: Negative for depression, anxiety  HEME/LYMPH: Negative for bruising, easy bleeding, lymphadenopathy  ENDOCRINE: Negative for polyuria, polydipsia, temperature intolerance

## 2021-10-12 NOTE — ED ADULT TRIAGE NOTE - HEIGHT IN INCHES
Interval History/Significant Events: Patient had episodes of A fib with RVR and was placed on amiodarone drip. CRRT started overnight. During CRRT, patient became hypotensive and bradycardic so amio was discontinued and patient was placed back on levophed. Patient still receiving CRRT this morning but vitals have stabilized and levo has been discontinued.      Review of Systems   Unable to perform ROS: Intubated     Objective:     Vital Signs (Most Recent):  Temp: 97.7 °F (36.5 °C) (09/18/18 0919)  Pulse: 91 (09/18/18 0919)  Resp: 19 (09/18/18 0809)  BP: 121/74 (09/18/18 0800)  SpO2: 100 % (09/18/18 0919) Vital Signs (24h Range):  Temp:  [95.5 °F (35.3 °C)-98.2 °F (36.8 °C)] 97.7 °F (36.5 °C)  Pulse:  [60-91] 91  Resp:  [16-29] 19  SpO2:  [95 %-100 %] 100 %  BP: ()/(52-74) 121/74  Arterial Line BP: ()/() 101/39   Weight: 102.7 kg (226 lb 6.6 oz)  Body mass index is 38.86 kg/m².      Intake/Output Summary (Last 24 hours) at 9/18/2018 0923  Last data filed at 9/18/2018 0900  Gross per 24 hour   Intake 1973.43 ml   Output 2856 ml   Net -882.57 ml       Physical Exam   Constitutional: Vital signs are normal. She appears well-developed and well-nourished. She is cooperative. She is intubated and restrained.   HENT:   Head: Normocephalic and atraumatic.   Eyes: Lids are normal. Right eye exhibits normal extraocular motion. Left eye exhibits normal extraocular motion.   Neck: Normal range of motion. Neck supple.   Trialysis central venous catheter in right internal jugular   Cardiovascular: Normal rate, regular rhythm, normal heart sounds and intact distal pulses. Exam reveals no gallop, no distant heart sounds and no friction rub.   No murmur heard.  Pulses:       Dorsalis pedis pulses are Detected w/ doppler on the right side, and Detected w/ doppler on the left side.        Posterior tibial pulses are Detected w/ doppler on the right side, and Detected w/ doppler on the left side.   3+ pitting edema of  bilateral lower extremity up to knees  Edema noted to right hand    Pulmonary/Chest: No accessory muscle usage. No apnea, no tachypnea and no bradypnea. She is intubated. No respiratory distress. She has no decreased breath sounds. She has no wheezes. She has rhonchi in the right upper field, the right middle field, the left upper field and the left middle field. She has no rales.   Abdominal: Soft. Normal appearance. She exhibits distension. Bowel sounds are decreased. There is no tenderness. No hernia.   Genitourinary:   Genitourinary Comments: Rash between inguinal folds and upper thighs bilaterally   Musculoskeletal:   Arterial line in carotid artery of right wrist   Neurological: She is alert. She has normal strength.   Opens eyes spontaneously and follows commands.    Skin: Skin is dry and intact. She is not diaphoretic.       Vents:  Vent Mode: Spont (09/18/18 0919)  Set Rate: 0 bmp (09/18/18 0919)  Vt Set: 0 mL (09/18/18 0919)  Pressure Support: 5 cmH20 (09/18/18 0919)  PEEP/CPAP: 5 cmH20 (09/18/18 0919)  Oxygen Concentration (%): 30 (09/18/18 0919)  Peak Airway Pressure: 10 cmH2O (09/18/18 0919)  Plateau Pressure: 21 cmH20 (09/18/18 0919)  Total Ve: 7.34 mL (09/18/18 0919)  Negative Inspiratory Force (cm H2O): -24 (09/15/18 1155)  F/VT Ratio<105 (RSBI): 267.61 (09/18/18 0809)  Lines/Drains/Airways     Central Venous Catheter Line                 Trialysis (Dialysis) Catheter 09/12/18 1326 right internal jugular 5 days          Drain                 NG/OG Tube 09/12/18 1325 orogastric Center mouth 5 days         Urethral Catheter 09/13/18 1438 Temperature probe 4 days          Airway                 Airway - Non-Surgical 09/12/18 1012 Endotracheal Tube 5 days          Arterial Line                 Arterial Line 09/15/18 1500 Left Radial 2 days          Pressure Ulcer                 Negative Pressure Wound Therapy  41 days          Peripheral Intravenous Line                 Midline Catheter  Insertion/Assessment  - Single Lumen 09/17/18 1235 Right cephalic vein (lateral side of arm) 20g x 10cm less than 1 day              Significant Labs:    CBC/Anemia Profile:  Recent Labs   Lab  09/17/18   0254  09/17/18   0407  09/18/18   0119   WBC  16.65*  16.11*  20.58*   HGB  7.6*  7.6*  7.3*   HCT  22.1*  21.9*  22.3*   PLT  211  198  225   MCV  75*  74*  77*   RDW  17.4*  17.3*  17.3*        Chemistries:  Recent Labs   Lab  09/17/18   0254   09/17/18   2116  09/17/18   2353  09/18/18   0119  09/18/18   0312   NA   --    < >  129*  139   --   139   K   --    < >  3.8  4.2   --   4.2   CL   --    < >  94*  107   --   106   CO2   --    < >  23  26   --   27   BUN   --    < >  72*  4*   --   3*   CREATININE   --    < >  3.2*  0.4*   --   0.4*   CALCIUM   --    < >  7.9*  7.3*   --   7.2*   ALBUMIN  1.6*   --   1.5*   --   1.7*  1.5*   PROT  5.2*   --    --    --   5.2*   --    BILITOT  0.6   --    --    --   0.5   --    ALKPHOS  114   --    --    --   117   --    ALT  8*   --    --    --   8*   --    AST  10   --    --    --   12   --    MG  1.7   --   1.5*   --   1.7  1.5*   PHOS  5.9*   --   4.6*   --   3.2  <1.0*    < > = values in this interval not displayed.       All pertinent labs within the past 24 hours have been reviewed.    Significant Imaging:  I have reviewed and interpreted all pertinent imaging results/findings within the past 24 hours.   0.4

## 2021-10-12 NOTE — ED PROVIDER NOTE - CLINICAL SUMMARY MEDICAL DECISION MAKING FREE TEXT BOX
86 year old woman with PMH sarcoidosis on prednisone, CKD, prior CVA, HTN, gout, GERD presenting with complaint of chest and epigastric pain. Differential includes GERD and ACS. EKG and troponins negative for ACS. History and improvement with Maalox supportive of GERD. 86 year old woman with PMH sarcoidosis on prednisone, CKD, prior CVA, HTN, gout, GERD presenting with complaint of chest and epigastric pain. Differential includes GERD and ACS. EKG and troponins x 2 negative for ACS. History and improvement with Maalox supportive of GERD. Patient advised on return precautions. Advised to monitor triggering foods, such as spicy foods. 86 year old woman with PMH sarcoidosis on prednisone, CKD, prior CVA, HTN, gout, GERD presenting with complaint of chest and epigastric pain. Differential includes GERD and ACS. EKG and troponins x 2 negative for ACS. History and improvement with Maalox supportive of GERD. Patient advised on return precautions. Advised to monitor triggering foods, such as spicy foods.    Jose: ate spicy food and had intense burning in upper abd. Although it is possible for acs, GI cause is more likely. will get EKG and labs. Heart score elevated by age and pmhx but would still d/c if labs ok

## 2021-10-13 VITALS
RESPIRATION RATE: 18 BRPM | HEART RATE: 54 BPM | DIASTOLIC BLOOD PRESSURE: 71 MMHG | OXYGEN SATURATION: 94 % | TEMPERATURE: 98 F | SYSTOLIC BLOOD PRESSURE: 152 MMHG

## 2021-10-13 LAB — TROPONIN T, HIGH SENSITIVITY RESULT: 12 NG/L — SIGNIFICANT CHANGE UP (ref 0–51)

## 2021-10-13 PROCEDURE — 71046 X-RAY EXAM CHEST 2 VIEWS: CPT

## 2021-10-13 PROCEDURE — 85025 COMPLETE CBC W/AUTO DIFF WBC: CPT

## 2021-10-13 PROCEDURE — 99284 EMERGENCY DEPT VISIT MOD MDM: CPT | Mod: 25

## 2021-10-13 PROCEDURE — 36415 COLL VENOUS BLD VENIPUNCTURE: CPT

## 2021-10-13 PROCEDURE — 80053 COMPREHEN METABOLIC PANEL: CPT

## 2021-10-13 PROCEDURE — 84484 ASSAY OF TROPONIN QUANT: CPT

## 2021-10-13 PROCEDURE — 93005 ELECTROCARDIOGRAM TRACING: CPT

## 2021-11-09 ENCOUNTER — APPOINTMENT (OUTPATIENT)
Dept: PULMONOLOGY | Facility: CLINIC | Age: 86
End: 2021-11-09
Payer: MEDICARE

## 2021-11-09 ENCOUNTER — LABORATORY RESULT (OUTPATIENT)
Age: 86
End: 2021-11-09

## 2021-11-09 VITALS
WEIGHT: 144 LBS | OXYGEN SATURATION: 97 % | SYSTOLIC BLOOD PRESSURE: 151 MMHG | RESPIRATION RATE: 15 BRPM | TEMPERATURE: 97.7 F | BODY MASS INDEX: 24.59 KG/M2 | DIASTOLIC BLOOD PRESSURE: 75 MMHG | HEIGHT: 64 IN | HEART RATE: 66 BPM

## 2021-11-09 DIAGNOSIS — M19.90 UNSPECIFIED OSTEOARTHRITIS, UNSPECIFIED SITE: ICD-10-CM

## 2021-11-09 LAB
25(OH)D3 SERPL-MCNC: 38.3 NG/ML
A1AT SERPL-MCNC: 127 MG/DL
ALBUMIN SERPL ELPH-MCNC: 4.2 G/DL
ALP BLD-CCNC: 67 U/L
ALT SERPL-CCNC: 25 U/L
ANION GAP SERPL CALC-SCNC: 15 MMOL/L
AST SERPL-CCNC: 31 U/L
BASOPHILS # BLD AUTO: 0.02 K/UL
BASOPHILS NFR BLD AUTO: 0.3 %
BILIRUB SERPL-MCNC: 0.4 MG/DL
BUN SERPL-MCNC: 25 MG/DL
CALCIUM SERPL-MCNC: 10 MG/DL
CALCIUM SERPL-MCNC: 10 MG/DL
CHLORIDE SERPL-SCNC: 100 MMOL/L
CO2 SERPL-SCNC: 25 MMOL/L
COVID-19 NUCLEOCAPSID  GAM ANTIBODY INTERPRETATION: NEGATIVE
COVID-19 SPIKE DOMAIN ANTIBODY INTERPRETATION: POSITIVE
CREAT SERPL-MCNC: 1.23 MG/DL
CRP SERPL-MCNC: <3 MG/L
ENA SCL70 IGG SER IA-ACNC: <0.2 AL
ENA SS-A AB SER IA-ACNC: <0.2 AL
ENA SS-B AB SER IA-ACNC: <0.2 AL
EOSINOPHIL # BLD AUTO: 0.36 K/UL
EOSINOPHIL NFR BLD AUTO: 4.6 %
ERYTHROCYTE [SEDIMENTATION RATE] IN BLOOD BY WESTERGREN METHOD: 63 MM/HR
GLUCOSE SERPL-MCNC: 108 MG/DL
HCT VFR BLD CALC: 35.8 %
HCYS SERPL-MCNC: 13.3 UMOL/L
HGB BLD-MCNC: 11.3 G/DL
IMM GRANULOCYTES NFR BLD AUTO: 0.3 %
INR PPP: 0.89 RATIO
LYMPHOCYTES # BLD AUTO: 1.18 K/UL
LYMPHOCYTES NFR BLD AUTO: 15.1 %
MAN DIFF?: NORMAL
MCHC RBC-ENTMCNC: 27.9 PG
MCHC RBC-ENTMCNC: 31.6 GM/DL
MCV RBC AUTO: 88.4 FL
MONOCYTES # BLD AUTO: 0.75 K/UL
MONOCYTES NFR BLD AUTO: 9.6 %
NEUTROPHILS # BLD AUTO: 5.51 K/UL
NEUTROPHILS NFR BLD AUTO: 70.1 %
NT-PROBNP SERPL-MCNC: 376 PG/ML
PARATHYROID HORMONE INTACT: 58 PG/ML
PHOSPHATE SERPL-MCNC: 3.7 MG/DL
PLATELET # BLD AUTO: 223 K/UL
POTASSIUM SERPL-SCNC: 3.7 MMOL/L
PROT SERPL-MCNC: 6.9 G/DL
PT BLD: 10.6 SEC
RBC # BLD: 4.05 M/UL
RBC # FLD: 14.7 %
RHEUMATOID FACT SER QL: 31 IU/ML
SARS-COV-2 AB SERPL IA-ACNC: >250 U/ML
SARS-COV-2 AB SERPL QL IA: 0.08 INDEX
SODIUM SERPL-SCNC: 139 MMOL/L
TSH SERPL-ACNC: 2.65 UIU/ML
URATE SERPL-MCNC: 6.7 MG/DL
VIT B12 SERPL-MCNC: 810 PG/ML
WBC # FLD AUTO: 7.84 K/UL

## 2021-11-09 PROCEDURE — 99205 OFFICE O/P NEW HI 60 MIN: CPT | Mod: 25

## 2021-11-09 PROCEDURE — 36415 COLL VENOUS BLD VENIPUNCTURE: CPT

## 2021-11-09 NOTE — REVIEW OF SYSTEMS
[Dry Eyes] : dry eyes [Cough] : cough [Arthralgias] : arthralgias [Fever] : no fever [Hay Fever] : no hay fever [GERD] : no gerd [Nocturia] : no nocturia [Raynaud] : no raynaud [Anemia] : no anemia [Headache] : no headache [Depression] : no depression [Obesity] : no obesity

## 2021-11-09 NOTE — PHYSICAL EXAM
[No Acute Distress] : no acute distress [Normal Oropharynx] : normal oropharynx [III] : Mallampati Class: III [No Neck Mass] : no neck mass [Normal S1, S2] : normal s1, s2 [Kyphosis] : kyphosis [Benign] : benign [Normal Gait] : normal gait [No Clubbing] : no clubbing [Normal Color/ Pigmentation] : normal color/ pigmentation [No Focal Deficits] : no focal deficits [Oriented x3] : oriented x3 [TextBox_68] : Bilateral decreased entry at bases

## 2021-11-09 NOTE — HISTORY OF PRESENT ILLNESS
[TextBox_4] : This letter  is regarding your patient  who  attended pulmonary out patient office today.  I have reviewed  patient's  past history, social history, family history and medication list. I also  reviewed nurse practitioners/ and fellows  notes and assessment and agree with it.  \par The patient was referred by Dr. LISE ODONNELL---- 86-year-old lady diagnosed with sarcoidosis in 1980 S to the mediastinum lymph node biopsy in [place] ----history of asthma----history of Wegener's granulomatosis with renal involvement-----type 2 diabetes------ chronic kidney disease------has history of recurrent CVAs----following up with Dr. Goldberg for her Wegener's--- previously was on azathioprine but that has been stopped--by her rheumatologist\par \par ------No history of , fever, chills , rigors, chest pain, or hemoptysis. Questionable history of Raynaud's phenomenon. No h/o significant weight loss in last few months. No history of liver dysfunction , collagen vascular disorder or chronic thromboembolic disease. I would classify the patient's dyspnea as WHO  FUNCTIONAL CLASS II--------\par \par \par ----Pft date--------- pending\par ----Ct scan date-----4/2021 IMPRESSION:\par No evidence of pneumonia.\par Chronic reticular opacities in the right lower lobe are unchanged from 12/01/2008.\par Chronic linear scarring in the left lower lobe and lingula is unchanged from 12/01/2008.--\par \par \par echo 4/2021 Hemodynamic: Estimated right atral pressure is normal.\par No evidence of pulmonary hypertension. \par No PFO seen with color Doppler.\par ------------------------------------------------------------------------\par Conclusions: \par Normal left ventricular systolic function. Probably no\par segmental wall motion abnormalities

## 2021-11-09 NOTE — DISCUSSION/SUMMARY
[FreeTextEntry1] : ---Assessment plan----------The patient has been referred here for further opinion regarding pulmonary problem,\par   86-year-old lady diagnosed with sarcoidosis in 1980 S to the mediastinum lymph node biopsy in [place] ----history of asthma----history of Wegener's granulomatosis with renal involvement-----type 2 diabetes------ chronic kidney disease------has history of recurrent CVAs----following up with Dr. Goldberg for her Wegener's GRANULOMATOSIS --- previously was on azathioprine but that has been stopped--by her rheumatologist\par CXR  Reviewed  chest x-ray with the radiologist\par \par 1--- history of sarcoidosis-----needs PFTand  CT scan----patient already on low-dose prednisone\par 2ASTHMA   AWAIT PFT  LABS drawn in our office today\par 3 osteoporosis/osteopenia----following up with Dr. Goldberg\par 4- she is up to date w/covid vac and influenza vac\par 5- -history of Wegener's granulomatosis with renal involvement---AWAITING LABS ,AND CT CHEST \par 6  CH KINDNEY DISEASE-----thank youF/U WITH ALI \par 7 TYPE  II DM--F/U METFORMIN\par f/u in 2/2022\par \par F/U WITH PMD, DR SHERIN LUCAS AND DR  SAYED ALI\par Thanks for allowing  me to participate  in the care of this patient.  Patient at this time  will follow  the above mentioned recommendations and return back for follow up visit. If you have any questions  I can be reached  at # [phone] (office).\par \par Cris Borges MD, FCCP \par Director, Pulmonary Hypertension Program \par [facility] \par Division of Pulmonary, Critical Care and Sleep Medicine \par  Professor of Medicine \par [employer]\par \par Aleshia Riley, ANP-C\par

## 2021-11-10 LAB
CARDIOLIPIN AB SER IA-ACNC: NEGATIVE
CCP AB SER IA-ACNC: <8 UNITS
DEPRECATED KAPPA LC FREE/LAMBDA SER: 1.42 RATIO
IGA SER QL IEP: 244 MG/DL
IGG SER QL IEP: 1245 MG/DL
IGM SER QL IEP: 136 MG/DL
KAPPA LC CSF-MCNC: 3.55 MG/DL
KAPPA LC SERPL-MCNC: 5.03 MG/DL
RF+CCP IGG SER-IMP: NEGATIVE

## 2021-11-11 LAB
FOLATE RBC-MCNC: 2425 NG/ML
HCT VFR BLD CALC: 35.8 %
M TB IFN-G BLD-IMP: NEGATIVE
QUANTIFERON TB PLUS MITOGEN MINUS NIL: 1.64 IU/ML
QUANTIFERON TB PLUS NIL: 0.02 IU/ML
QUANTIFERON TB PLUS TB1 MINUS NIL: 0 IU/ML
QUANTIFERON TB PLUS TB2 MINUS NIL: 0 IU/ML
TOTAL IGE SMQN RAST: 3 KU/L

## 2021-11-12 LAB
ANA PAT FLD IF-IMP: ABNORMAL
ANACR T: ABNORMAL

## 2021-11-15 LAB
ALBUMIN MFR SERPL ELPH: 54 %
ALBUMIN SERPL-MCNC: 3.9 G/DL
ALBUMIN/GLOB SERPL: 1.2 RATIO
ALPHA1 GLOB MFR SERPL ELPH: 4.1 %
ALPHA1 GLOB SERPL ELPH-MCNC: 0.3 G/DL
ALPHA2 GLOB MFR SERPL ELPH: 12.1 %
ALPHA2 GLOB SERPL ELPH-MCNC: 0.9 G/DL
B-GLOBULIN MFR SERPL ELPH: 12.9 %
B-GLOBULIN SERPL ELPH-MCNC: 0.9 G/DL
GAMMA GLOB FLD ELPH-MCNC: 1.2 G/DL
GAMMA GLOB MFR SERPL ELPH: 16.9 %
INTERPRETATION SERPL IEP-IMP: NORMAL
PROT SERPL-MCNC: 7.2 G/DL
PROT SERPL-MCNC: 7.2 G/DL

## 2021-12-22 ENCOUNTER — APPOINTMENT (OUTPATIENT)
Dept: CT IMAGING | Facility: CLINIC | Age: 86
End: 2021-12-22
Payer: MEDICARE

## 2021-12-22 ENCOUNTER — OUTPATIENT (OUTPATIENT)
Dept: OUTPATIENT SERVICES | Facility: HOSPITAL | Age: 86
LOS: 1 days | End: 2021-12-22
Payer: MEDICARE

## 2021-12-22 DIAGNOSIS — Z98.89 OTHER SPECIFIED POSTPROCEDURAL STATES: Chronic | ICD-10-CM

## 2021-12-22 DIAGNOSIS — Z90.49 ACQUIRED ABSENCE OF OTHER SPECIFIED PARTS OF DIGESTIVE TRACT: Chronic | ICD-10-CM

## 2021-12-22 DIAGNOSIS — R93.89 ABNORMAL FINDINGS ON DIAGNOSTIC IMAGING OF OTHER SPECIFIED BODY STRUCTURES: ICD-10-CM

## 2021-12-22 PROCEDURE — 71250 CT THORAX DX C-: CPT

## 2021-12-22 PROCEDURE — 71250 CT THORAX DX C-: CPT | Mod: 26

## 2022-01-21 ENCOUNTER — APPOINTMENT (OUTPATIENT)
Dept: PULMONOLOGY | Facility: CLINIC | Age: 87
End: 2022-01-21
Payer: MEDICARE

## 2022-01-21 VITALS
RESPIRATION RATE: 16 BRPM | HEART RATE: 65 BPM | WEIGHT: 145 LBS | HEIGHT: 64 IN | OXYGEN SATURATION: 98 % | DIASTOLIC BLOOD PRESSURE: 76 MMHG | BODY MASS INDEX: 24.75 KG/M2 | SYSTOLIC BLOOD PRESSURE: 148 MMHG | TEMPERATURE: 97.5 F

## 2022-01-21 PROCEDURE — 36415 COLL VENOUS BLD VENIPUNCTURE: CPT

## 2022-01-21 PROCEDURE — 99215 OFFICE O/P EST HI 40 MIN: CPT | Mod: 25

## 2022-01-21 NOTE — DISCUSSION/SUMMARY
[FreeTextEntry1] : ---Assessment plan----------The patient has been referred here for further opinion regarding pulmonary problem,\par   86-year-old lady diagnosed with sarcoidosis in 1980 S to the mediastinum lymph node biopsy in [place] ----history of asthma----history of Wegener's granulomatosis with renal involvement-----type 2 diabetes------ chronic kidney disease------has history of recurrent CVAs----following up with Dr. Goldberg for her Wegener's GRANULOMATOSIS --- previously was on azathioprine but that has been stopped--by her rheumatologist\par CXR  Reviewed  chest x-ray with the radiologist\par CT CHEST  DEC  2021---unchanged   b/l pleural calcification,  some areas of  the reticulations=---SUGGESTIVE OF ILD \par \par 1---REMOTE H/O  history of sarcoidosis---AND WEGNERS GRANULOMATOSIS----needs PFTand----patient already on low-dose prednisone\par 2ASTHMA   AWAIT PFT  LABS drawn in our office today--ALBUTEROL PRN--NEEDS PFT \par 3 osteoporosis/osteopenia----following up with Dr. Goldberg\par 4- she is up to date w/covid vac and influenza vac\par 5- -history of Wegener's granulomatosis with renal involvement---F/U WITH NEPHROLOGY , DR ODONNELL, RHEUMATOLOGY \par 6  CH KIDNEY  DISEASE-----F/U WITH ALI \par 7 TYPE  II DM--F/U METFORMIN\par f/u in   [facility]/2022\par \par F/U WITH PMD, DR SHERIN LUCAS AND DR  SAYED ALI\par Thanks for allowing  me to participate  in the care of this patient.  Patient at this time  will follow  the above mentioned recommendations and return back for follow up visit. If you have any questions  I can be reached  at # [phone] (office).\par \par Cris Borges MD, FCCP \par Director, Pulmonary Hypertension Program \par [facility] \par Division of Pulmonary, Critical Care and Sleep Medicine \par  Professor of Medicine \par [facility] [employer]\par \par Aleshia Riley ANP-C\par

## 2022-01-21 NOTE — HISTORY OF PRESENT ILLNESS
[TextBox_4] : This letter  is regarding your patient  who  attended pulmonary out patient office today.  I have reviewed  patient's  past history, social history, family history and medication list. I also  reviewed nurse practitioners/ and fellows  notes and assessment and agree with it.  \par The patient was referred by Dr. LISE ODONNELL---- 86-year-old lady diagnosed with sarcoidosis in 1980 S to the mediastinum lymph node biopsy in [place] ----history of asthma----history of Wegener's granulomatosis with renal involvement-----type 2 diabetes------ chronic kidney disease------has history of recurrent CVAs----following up with Dr. Goldberg for her Wegener's--- previously was on azathioprine but that has been stopped--by her rheumatologist\par \par ------No history of , fever, chills , rigors, chest pain, or hemoptysis. Questionable history of Raynaud's phenomenon. No h/o significant weight loss in last few months. No history of liver dysfunction , collagen vascular disorder or chronic thromboembolic disease. I would classify the patient's dyspnea as WHO  FUNCTIONAL CLASS II--------\par \par \par ----Pft date--------- pending\par ----Ct scan date-----4/2021 IMPRESSION:\par No evidence of pneumonia.\par Chronic reticular opacities in the right lower lobe are unchanged from 12/01/2008.\par Chronic linear scarring in the left lower lobe and lingula is unchanged from 12/01/2008.--\par \par \par EXAM: CT CHESTROCEDURE DATE: 12/22/2021\par Mediastinum/Vessels/Heart: Enlarged pulmonary artery suggestive of underlying pulmonary hypertension without change from prior.. There is no pericardial effusion. No lymphadenopathy. Thyroid gland is unremarkable\par Lungs/Pleura/Airways: Bilateral calcified pleural plaques are seen, unchanged since prior. Unchanged peripheral groundglass abnormality in the right lower lobe. Reticular opacities in the left lower lobe with focal area of volume loss without change from previous exam may represent an area of rounded atelectasis or sequela of prior hemothorax, without change in appearance.\par Focal area of reticulation/fibrosis in the right upper lobe on series 2 image 35 is unchanged. A right lower lobe 6 mm nodule on series 2 image 84 is unchanged.\par \par IMPRESSION:\par When compared with the previous exam of April 15, 2021, there is no change with details as described above\par \par \par \par ct chest 12/2021 \par Lungs/Pleura/Airways: Bilateral calcified pleural plaques are seen, unchanged since prior. Unchanged peripheral groundglass abnormality in the right lower lobe. Reticular opacities in the left lower lobe with focal area of volume loss without change from previous exam may represent an area of rounded atelectasis or sequela of prior hemothorax, without change in appearance.\par \par Focal area of reticulation/fibrosis in the right upper lobe on series 2 image 35 is unchanged. A right lower lobe 6 mm nodule on series 2 image 84 is unchanged.\par \par \par echo 4/2021 Hemodynamic: Estimated right atral pressure is normal.\par No evidence of pulmonary hypertension. \par No PFO seen with color Doppler.\par ------------------------------------------------------------------------\par Conclusions: \par Normal left ventricular systolic function. Probably no\par segmental wall motion abnormalities\par \par jan 21 2022---------on prednsisoen 5 mg po qd----   ct dec 2021  unchanged -----

## 2022-01-22 LAB — SARS-COV-2 N GENE NPH QL NAA+PROBE: NOT DETECTED

## 2022-01-25 ENCOUNTER — APPOINTMENT (OUTPATIENT)
Dept: PULMONOLOGY | Facility: CLINIC | Age: 87
End: 2022-01-25

## 2022-03-25 ENCOUNTER — APPOINTMENT (OUTPATIENT)
Dept: PULMONOLOGY | Facility: CLINIC | Age: 87
End: 2022-03-25
Payer: MEDICARE

## 2022-03-25 ENCOUNTER — LABORATORY RESULT (OUTPATIENT)
Age: 87
End: 2022-03-25

## 2022-03-25 VITALS — OXYGEN SATURATION: 97 % | HEART RATE: 70 BPM | RESPIRATION RATE: 15 BRPM

## 2022-03-25 VITALS
HEART RATE: 64 BPM | BODY MASS INDEX: 28.66 KG/M2 | HEIGHT: 60 IN | WEIGHT: 146 LBS | DIASTOLIC BLOOD PRESSURE: 70 MMHG | TEMPERATURE: 96.3 F | SYSTOLIC BLOOD PRESSURE: 130 MMHG | OXYGEN SATURATION: 95 %

## 2022-03-25 DIAGNOSIS — R06.00 DYSPNEA, UNSPECIFIED: ICD-10-CM

## 2022-03-25 PROCEDURE — 94729 DIFFUSING CAPACITY: CPT

## 2022-03-25 PROCEDURE — 36415 COLL VENOUS BLD VENIPUNCTURE: CPT

## 2022-03-25 PROCEDURE — ZZZZZ: CPT

## 2022-03-25 PROCEDURE — 94010 BREATHING CAPACITY TEST: CPT

## 2022-03-25 PROCEDURE — 94726 PLETHYSMOGRAPHY LUNG VOLUMES: CPT

## 2022-03-25 PROCEDURE — 99215 OFFICE O/P EST HI 40 MIN: CPT | Mod: 25

## 2022-03-25 NOTE — DISCUSSION/SUMMARY
[FreeTextEntry1] : ---Assessment plan----------The patient has been referred here for further opinion regarding pulmonary problem,\par   86-year-old lady diagnosed with sarcoidosis in 1980 S to the mediastinum lymph node biopsy in [place] ----history of asthma----history of Wegener's granulomatosis with renal involvement-----type 2 diabetes------ chronic kidney disease------has history of recurrent CVAs----following up with Dr. Goldberg for her Wegener's GRANULOMATOSIS --- previously was on azathioprine but that has been stopped--by her rheumatologist\par CXR  Reviewed  chest x-ray with the radiologist\par CT CHEST  DEC  2021---unchanged   b/l pleural calcification,  some areas of  the reticulations=---SUGGESTIVE OF ILD \par \par 1---REMOTE H/O  history of sarcoidosis---AND WEGNERS GRANULOMATOSIS--already on low-dose prednisone by Dr Goldberg. \par 2  osteoporosis/osteopenia----following up with Dr. Goldberg  Get bone density w/MD\par 3 she is up to date w/covid vac and influenza vac\par 4 -history of Wegener's granulomatosis with renal involvement---F/U WITH NEPHROLOGY , DR ODONNELL,  and  RHEUMATOLOGY \par 5- TYPE  II DM--F/U METFORMIN\par 6- labs drawn in our office today\par 7- discussed home exercise, preventing deconditioning. Exercise bands and written chair exercises given to pt\par 8- f/u in 6months\par Thanks for allowing  me to participate  in the care of this patient.  Patient at this time  will follow  the above mentioned recommendations and return back for follow up visit. If you have any questions  I can be reached  at # [phone] (office).\par \par GADIEL CooperC\par for\par \par Cris Borges MD, [facility] \par Director, Pulmonary Hypertension Program \par [facility] \par Division of Pulmonary, Critical Care and Sleep Medicine \par  Professor of Medicine \par [employer]\par \par NARCISA Cooper-C\par

## 2022-03-25 NOTE — HISTORY OF PRESENT ILLNESS
[TextBox_4] : This letter  is regarding your patient  who  attended pulmonary out patient office today.  I have reviewed  patient's  past history, social history, family history and medication list. I also  reviewed nurse practitioners/ and fellows  notes and assessment and agree with it.  \par The patient was referred by Dr. LISE THOMPSON---- 86-year-old lady diagnosed with sarcoidosis in 1980 S to the mediastinum lymph node biopsy in [place] ----history of asthma----history of Wegener's granulomatosis with renal involvement-----type 2 diabetes------ chronic kidney disease------has history of recurrent CVAs----following up with Dr. Goldberg for her Wegener's--- previously was on azathioprine but that has been stopped--by her rheumatologist\par \par ------No history of , fever, chills , rigors, chest pain, or hemoptysis. Questionable history of Raynaud's phenomenon. No h/o significant weight loss in last few months. No history of liver dysfunction , collagen vascular disorder or chronic thromboembolic disease. I would classify the patient's dyspnea as WHO  FUNCTIONAL CLASS II--------\par \par \par ----Pft date--------- pending\par ----Ct scan date-----4/2021 IMPRESSION:\par No evidence of pneumonia.\par Chronic reticular opacities in the right lower lobe are unchanged from 12/01/2008.\par Chronic linear scarring in the left lower lobe and lingula is unchanged from 12/01/2008.--\par \par \par EXAM: CT CHESTROCEDURE DATE: 12/22/2021\par Mediastinum/Vessels/Heart: Enlarged pulmonary artery suggestive of underlying pulmonary hypertension without change from prior.. There is no pericardial effusion. No lymphadenopathy. Thyroid gland is unremarkable\par Lungs/Pleura/Airways: Bilateral calcified pleural plaques are seen, unchanged since prior. Unchanged peripheral groundglass abnormality in the right lower lobe. Reticular opacities in the left lower lobe with focal area of volume loss without change from previous exam may represent an area of rounded atelectasis or sequela of prior hemothorax, without change in appearance.\par Focal area of reticulation/fibrosis in the right upper lobe on series 2 image 35 is unchanged. A right lower lobe 6 mm nodule on series 2 image 84 is unchanged.\par \par IMPRESSION:\par When compared with the previous exam of April 15, 2021, there is no change with details as described above\par \par \par \par ct chest 12/2021 \par Lungs/Pleura/Airways: Bilateral calcified pleural plaques are seen, unchanged since prior. Unchanged peripheral groundglass abnormality in the right lower lobe. Reticular opacities in the left lower lobe with focal area of volume loss without change from previous exam may represent an area of rounded atelectasis or sequela of prior hemothorax, without change in appearance.\par \par Focal area of reticulation/fibrosis in the right upper lobe on series 2 image 35 is unchanged. A right lower lobe 6 mm nodule on series 2 image 84 is unchanged.\par \par \par echo 4/2021 Hemodynamic: Estimated right atral pressure is normal.\par No evidence of pulmonary hypertension. \par No PFO seen with color Doppler.\par ------------------------------------------------------------------------\par Conclusions: \par Normal left ventricular systolic function. 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 She has c/o mild MADRID w/stair climbing. Lives alone and is not very active.\par Rheum Dr Goldberg- has arthritis. Cardiology- Dr Humphries and renal Dr Thompson .. Ambulates w/cane- gait steady\par She is UTD with covid vac

## 2022-03-25 NOTE — PHYSICAL EXAM
[No Acute Distress] : no acute distress [Normal Oropharynx] : normal oropharynx [III] : Mallampati Class: III [No Neck Mass] : no neck mass [Normal S1, S2] : normal s1, s2 [Kyphosis] : kyphosis [Benign] : benign [Normal Gait] : normal gait [No Clubbing] : no clubbing [Normal Color/ Pigmentation] : normal color/ pigmentation [No Focal Deficits] : no focal deficits [Oriented x3] : oriented x3 [TextBox_54] : murmur [TextBox_68] : Bilateral decreased entry at bases

## 2022-03-27 LAB
ALBUMIN SERPL ELPH-MCNC: 4.4 G/DL
ALP BLD-CCNC: 56 U/L
ALT SERPL-CCNC: 20 U/L
ANION GAP SERPL CALC-SCNC: 14 MMOL/L
AST SERPL-CCNC: 28 U/L
BASOPHILS # BLD AUTO: 0.02 K/UL
BASOPHILS NFR BLD AUTO: 0.3 %
BILIRUB SERPL-MCNC: 0.4 MG/DL
BUN SERPL-MCNC: 25 MG/DL
CALCIUM SERPL-MCNC: 10.4 MG/DL
CHLORIDE SERPL-SCNC: 99 MMOL/L
CO2 SERPL-SCNC: 26 MMOL/L
COVID-19 NUCLEOCAPSID  GAM ANTIBODY INTERPRETATION: NEGATIVE
CREAT SERPL-MCNC: 1.11 MG/DL
EGFR: 49 ML/MIN/1.73M2
EOSINOPHIL # BLD AUTO: 0.26 K/UL
EOSINOPHIL NFR BLD AUTO: 3.4 %
GLUCOSE SERPL-MCNC: 110 MG/DL
HCT VFR BLD CALC: 35.7 %
HGB BLD-MCNC: 10.9 G/DL
IMM GRANULOCYTES NFR BLD AUTO: 0.1 %
LYMPHOCYTES # BLD AUTO: 0.78 K/UL
LYMPHOCYTES NFR BLD AUTO: 10.3 %
MAN DIFF?: NORMAL
MCHC RBC-ENTMCNC: 26.7 PG
MCHC RBC-ENTMCNC: 30.5 GM/DL
MCV RBC AUTO: 87.3 FL
MONOCYTES # BLD AUTO: 0.5 K/UL
MONOCYTES NFR BLD AUTO: 6.6 %
NEUTROPHILS # BLD AUTO: 5.98 K/UL
NEUTROPHILS NFR BLD AUTO: 79.3 %
NT-PROBNP SERPL-MCNC: 352 PG/ML
PLATELET # BLD AUTO: 226 K/UL
POTASSIUM SERPL-SCNC: 3.8 MMOL/L
PROT SERPL-MCNC: 7.3 G/DL
RBC # BLD: 4.09 M/UL
RBC # FLD: 15.5 %
SARS-COV-2 AB SERPL QL IA: 0.11 INDEX
SODIUM SERPL-SCNC: 139 MMOL/L
WBC # FLD AUTO: 7.55 K/UL

## 2022-03-31 NOTE — ED ADULT TRIAGE NOTE - NS ED NURSE DIRECT TO ROOM YN
Problem: Pain  Goal: # Acceptable pain level achieved/maintained at rest using NRS/Faces without oversedation (opioid naive or PCA/Epidural infusion)  Description: This goal is used if Opioid-naïve or on PCA/Epidural Infusion.  Outcome: Outcome Met, Continue evaluating goal progress toward completion     Problem: VTE, Risk for  Goal: # Verbalizes understanding of VTE risk factors and prevention  Description: Document education using the patient education activity.   Outcome: Outcome Met, Continue evaluating goal progress toward completion      No

## 2022-09-08 NOTE — ED ADULT NURSE NOTE - NSICDXFAMILYHX_GEN_ALL_CORE_FT
dorsal
FAMILY HISTORY:  Sibling  Still living? Yes, Estimated age: Age Unknown  Type 2 diabetes mellitus, Age at diagnosis: Age Unknown

## 2022-09-22 ENCOUNTER — APPOINTMENT (OUTPATIENT)
Dept: PULMONOLOGY | Facility: CLINIC | Age: 87
End: 2022-09-22

## 2022-09-26 ENCOUNTER — APPOINTMENT (OUTPATIENT)
Dept: PULMONOLOGY | Facility: CLINIC | Age: 87
End: 2022-09-26

## 2022-09-26 DIAGNOSIS — R06.2 WHEEZING: ICD-10-CM

## 2022-09-26 DIAGNOSIS — R05.9 COUGH, UNSPECIFIED: ICD-10-CM

## 2022-09-26 PROCEDURE — 99443: CPT | Mod: 95

## 2022-09-26 RX ORDER — PREDNISONE 10 MG/1
10 TABLET ORAL DAILY
Qty: 14 | Refills: 0 | Status: ACTIVE | COMMUNITY
Start: 2022-09-26 | End: 1900-01-01

## 2022-09-26 RX ORDER — AZITHROMYCIN 250 MG/1
250 TABLET, FILM COATED ORAL
Qty: 1 | Refills: 0 | Status: ACTIVE | COMMUNITY
Start: 2022-09-26 | End: 1900-01-01

## 2022-09-28 NOTE — HISTORY OF PRESENT ILLNESS
[Home] : at home, [unfilled] , at the time of the visit. [Medical Office: ([facility])___] : at the medical office located in  [Verbal consent obtained from patient] : the patient, [unfilled] [TextBox_4] : This letter  is regarding your patient  who  attended pulmonary out patient office today.  I have reviewed  patient's  past history, social history, family history and medication list. I also  reviewed nurse practitioners/ and fellows  notes and assessment and agree with it.  \par The patient was referred by Dr. LISE ODONNELL---- 86-year-old lady diagnosed with sarcoidosis in 1980 S to the mediastinum lymph node biopsy in [place] ----history of asthma----history of Wegener's granulomatosis with renal involvement-----type 2 diabetes------ chronic kidney disease------has history of recurrent CVAs----following up with Dr. Goldberg for her Wegener's--- previously was on azathioprine but that has been stopped--by her rheumatologist\par \par ------No history of , fever, chills , rigors, chest pain, or hemoptysis. Questionable history of Raynaud's phenomenon. No h/o significant weight loss in last few months. No history of liver dysfunction , collagen vascular disorder or chronic thromboembolic disease. I would classify the patient's dyspnea as WHO  FUNCTIONAL CLASS II--------\par \par \par ----Pft date--------- pending\par ----Ct scan date-----4/2021 IMPRESSION:\par No evidence of pneumonia.\par Chronic reticular opacities in the right lower lobe are unchanged from 12/01/2008.\par Chronic linear scarring in the left lower lobe and lingula is unchanged from 12/01/2008.--\par \par \par EXAM: CT CHESTROCEDURE DATE: 12/22/2021\par Mediastinum/Vessels/Heart: Enlarged pulmonary artery suggestive of underlying pulmonary hypertension without change from prior.. There is no pericardial effusion. No lymphadenopathy. Thyroid gland is unremarkable\par Lungs/Pleura/Airways: Bilateral calcified pleural plaques are seen, unchanged since prior. Unchanged peripheral groundglass abnormality in the right lower lobe. Reticular opacities in the left lower lobe with focal area of volume loss without change from previous exam may represent an area of rounded atelectasis or sequela of prior hemothorax, without change in appearance.\par Focal area of reticulation/fibrosis in the right upper lobe on series 2 image 35 is unchanged. A right lower lobe 6 mm nodule on series 2 image 84 is unchanged.\par \par IMPRESSION:\par When compared with the previous exam of April 15, 2021, there is no change with details as described above\par \par \par \par ct chest 12/2021 \par Lungs/Pleura/Airways: Bilateral calcified pleural plaques are seen, unchanged since prior. Unchanged peripheral groundglass abnormality in the right lower lobe. Reticular opacities in the left lower lobe with focal area of volume loss without change from previous exam may represent an area of rounded atelectasis or sequela of prior hemothorax, without change in appearance.\par \par Focal area of reticulation/fibrosis in the right upper lobe on series 2 image 35 is unchanged. A right lower lobe 6 mm nodule on series 2 image 84 is unchanged.\par \par \par echo 4/2021 Hemodynamic: Estimated right atral pressure is normal.\par No evidence of pulmonary hypertension. \par No PFO seen with color Doppler.\par ------------------------------------------------------------------------\par Conclusions: \par Normal left ventricular systolic function. Probably no\par segmental wall motion abnormalities\par \par jan 21 2022---------on prednsisoen 5 mg po qd----   ct dec 2021  unchanged -----\par \par 9/2022 ttm with pt\par c/o wheezing/cough- worse in am- exacerbated by change in weather\par CTD- follows rheum Dr Goldberg- on pred 5mg

## 2022-09-28 NOTE — DISCUSSION/SUMMARY
[FreeTextEntry1] : ---Assessment plan----------The patient has been referred here for further opinion regarding pulmonary problem,\par   87-year-old lady diagnosed with sarcoidosis in 1980 S to the mediastinum lymph node biopsy in [place] ----history of asthma----history of Wegener's granulomatosis with renal involvement-----type 2 diabetes------ chronic kidney disease------has history of recurrent CVAs----following up with Dr. Goldberg for her Wegener's GRANULOMATOSIS --- previously was on azathioprine but that has been stopped--by her rheumatologist\par CXR  Reviewed  chest x-ray with the radiologist\par CT CHEST  DEC  2021---unchanged   b/l pleural calcification,  some areas of  the reticulations=---SUGGESTIVE OF ILD \par \par 1---REMOTE H/O  history of sarcoidosis---AND Tressa GRANULOMATOSIS----needs PFTand----patient already on low-dose prednisone\par 2 ASTHMA  exacerbation- increase pred 10mg x 2 weeks- and start zpack, ALBUTEROL PRN--NEEDS PFT \par 3 osteoporosis/osteopenia----following up with Dr. Goldberg\par 4- she is up to date w/covid vac\par 5- -history of Wegener's granulomatosis with renal involvement---F/U WITH NEPHROLOGY , DR ODONNELL, RHEUMATOLOGY \par 6  CH KIDNEY  DISEASE-----F/U WITH ALI \par 7 TYPE  II DM--F/U METFORMIN\par 8- f/u next month with PFT\par \par F/U WITH PMD, DR SHERIN LUCAS AND DR  SAYED ALI\par Thanks for allowing  me to participate  in the care of this patient.  Patient at this time  will follow  the above mentioned recommendations and return back for follow up visit. If you have any questions  I can be reached  at # [phone] (office).\par \par Cris Borges MD, FCCP \par Director, Pulmonary Hypertension Program \par [facility] \par Division of Pulmonary, Critical Care and Sleep Medicine \par  Professor of Medicine \par [facility] [employer]\par \par Aleshia Riley ANP-C\par

## 2022-10-29 NOTE — PATIENT PROFILE ADULT. - FUNCTIONAL SCREEN CURRENT LEVEL: BATHING, MLM
(0) independent Nakita - assumed care, pt reports feeling better after meds, requesting DC. Will send colchicine and percocet, advised to start GI ppx that pt has at home

## 2022-11-14 ENCOUNTER — APPOINTMENT (OUTPATIENT)
Dept: MRI IMAGING | Facility: CLINIC | Age: 87
End: 2022-11-14

## 2022-12-12 NOTE — PROGRESS NOTE ADULT - PROBLEM SELECTOR PROBLEM 1
Shortness of breath [Testes] : normal [No focal deficits] : no focal deficits [Gait normal] : gait normal [Motor Exam nomal] : motor exam normal [Normal] : affect appropriate [100: Normal, no complaints, no evidence of disease.] : 100: Normal, no complaints, no evidence of disease. [Cervical Lymph Nodes Enlarged Posterior Bilaterally] : posterior cervical [Supraclavicular Lymph Nodes Enlarged Bilaterally] : supraclavicular [Cervical Lymph Nodes Enlarged Anterior Bilaterally] : anterior cervical [de-identified] : left upper chest mediport scar well healed

## 2023-02-07 ENCOUNTER — APPOINTMENT (OUTPATIENT)
Dept: PULMONOLOGY | Facility: CLINIC | Age: 88
End: 2023-02-07
Payer: MEDICARE

## 2023-02-07 VITALS
HEART RATE: 71 BPM | SYSTOLIC BLOOD PRESSURE: 122 MMHG | OXYGEN SATURATION: 94 % | BODY MASS INDEX: 24.8 KG/M2 | DIASTOLIC BLOOD PRESSURE: 73 MMHG | HEIGHT: 63 IN | WEIGHT: 140 LBS

## 2023-02-07 DIAGNOSIS — R93.89 ABNORMAL FINDINGS ON DIAGNOSTIC IMAGING OF OTHER SPECIFIED BODY STRUCTURES: ICD-10-CM

## 2023-02-07 PROCEDURE — 99215 OFFICE O/P EST HI 40 MIN: CPT

## 2023-02-07 RX ORDER — PREDNISONE 2.5 MG/1
2.5 TABLET ORAL
Qty: 60 | Refills: 3 | Status: ACTIVE | COMMUNITY
Start: 2023-02-07 | End: 1900-01-01

## 2023-02-07 NOTE — HISTORY OF PRESENT ILLNESS
[Never] : never [TextBox_4] : This letter  is regarding your patient  who  attended pulmonary out patient office today.  I have reviewed  patient's  past history, social history, family history and medication list. I also  reviewed nurse practitioners/ and fellows  notes and assessment and agree with it.  \par The patient was referred by Dr. LISE ODONNELL---- 86-year-old lady diagnosed with sarcoidosis in 1980 S to the mediastinum lymph node biopsy in [place] ----history of asthma----history of Wegener's granulomatosis with renal involvement-----type 2 diabetes------ chronic kidney disease------has history of recurrent CVAs----following up with Dr. Goldberg for her Wegener's--- previously was on azathioprine but that has been stopped--by her rheumatologist\par \par ------No history of , fever, chills , rigors, chest pain, or hemoptysis. Questionable history of Raynaud's phenomenon. No h/o significant weight loss in last few months. No history of liver dysfunction , collagen vascular disorder or chronic thromboembolic disease. I would classify the patient's dyspnea as WHO  FUNCTIONAL CLASS II--------\par \par \par ----Pft date--------- pending\par ----Ct scan date-----4/2021 IMPRESSION:\par No evidence of pneumonia.\par Chronic reticular opacities in the right lower lobe are unchanged from 12/01/2008.\par Chronic linear scarring in the left lower lobe and lingula is unchanged from 12/01/2008.--\par \par \par EXAM: CT CHESTROCEDURE DATE: 12/22/2021\par Mediastinum/Vessels/Heart: Enlarged pulmonary artery suggestive of underlying pulmonary hypertension without change from prior.. There is no pericardial effusion. No lymphadenopathy. Thyroid gland is unremarkable\par Lungs/Pleura/Airways: Bilateral calcified pleural plaques are seen, unchanged since prior. Unchanged peripheral groundglass abnormality in the right lower lobe. Reticular opacities in the left lower lobe with focal area of volume loss without change from previous exam may represent an area of rounded atelectasis or sequela of prior hemothorax, without change in appearance.\par Focal area of reticulation/fibrosis in the right upper lobe on series 2 image 35 is unchanged. A right lower lobe 6 mm nodule on series 2 image 84 is unchanged.\par \par IMPRESSION:\par When compared with the previous exam of April 15, 2021, there is no change with details as described above\par \par \par \par ct chest 12/2021 \par Lungs/Pleura/Airways: Bilateral calcified pleural plaques are seen, unchanged since prior. Unchanged peripheral groundglass abnormality in the right lower lobe. Reticular opacities in the left lower lobe with focal area of volume loss without change from previous exam may represent an area of rounded atelectasis or sequela of prior hemothorax, without change in appearance.\par \par Focal area of reticulation/fibrosis in the right upper lobe on series 2 image 35 is unchanged. A right lower lobe 6 mm nodule on series 2 image 84 is unchanged.\par \par \par echo 4/2021 Hemodynamic: Estimated right atral pressure is normal.\par No evidence of pulmonary hypertension. \par No PFO seen with color Doppler.\par ------------------------------------------------------------------------\par Conclusions: \par Normal left ventricular systolic function. Probably no\par segmental wall motion abnormalities\par \par jan 21 2022---------on prednsisoen 5 mg po qd----   ct dec 2021  unchanged -----\par \par 9/2022 ttm with pt\par c/o wheezing/cough- worse in am- exacerbated by change in weather\par CTD- follows rheum Dr Goldberg- on pred 5mg\par \par FEB 2023----no respiratory complaints well-balanced on prednisone 5 mg needs DEXA scan --- - -  also follow-up with Dr. Goldberg in rheumatology

## 2023-02-07 NOTE — DISCUSSION/SUMMARY
[FreeTextEntry1] : ---Assessment plan----------The patient has been referred here for further opinion regarding pulmonary problem,\par   87-year-old lady diagnosed with sarcoidosis in 1980 S to the mediastinum lymph node biopsy in [place] ----history of asthma----history of Wegener's granulomatosis with renal involvement-----type 2 diabetes------ chronic kidney disease------has history of recurrent CVAs----following up with Dr. Goldberg for her Wegener's GRANULOMATOSIS --- previously was on azathioprine but that has been stopped--by her rheumatologist\par CXR  Reviewed  chest x-ray with the radiologist\par CT CHEST  DEC  2021---unchanged   b/l pleural calcification,  some areas of  the reticulations=---SUGGESTIVE OF ILD \par \par 1---REMOTE H/O  history of sarcoidosis---AND Tressa GRANULOMATOSIS----needs PFTand----patient already on low-dose prednisone\par 2 ASTHMA  exacerbation- increase pred 10mg x 2 weeks- and start zpack, ALBUTEROL PRN--NEEDS PFT \par 3 osteoporosis/osteopenia----following up with Dr. Goldberg\par 4- she is up to date w/covid vac\par 5- -history of Wegener's granulomatosis with renal involvement---F/U WITH NEPHROLOGY , DR ODONNELL, RHEUMATOLOGY \par 6  CH KIDNEY  DISEASE-----F/U WITH ALI \par 7 TYPE  II DM--F/U METFORMIN\par 8- f/u next month with PFT\par \par F/U WITH PMD, DR SHERIN LUCAS AND DR  SAYED ALI\par \par Thanks for allowing  me to participate  in the care of this patient.  Patient at this time  will follow  the above mentioned recommendations and return back for follow up visit. If you have any questions  I can be reached  at # [phone] (office).\par \par Cris Borges MD, FCCP \par Director, Pulmonary Hypertension Program \par [facility] \par Division of Pulmonary, Critical Care and Sleep Medicine \par  Professor of Medicine \par [facility]\par \par Aleshia Riley, ANP-C\par

## 2023-02-23 ENCOUNTER — APPOINTMENT (OUTPATIENT)
Dept: RADIOLOGY | Facility: IMAGING CENTER | Age: 88
End: 2023-02-23
Payer: MEDICARE

## 2023-02-23 ENCOUNTER — OUTPATIENT (OUTPATIENT)
Dept: OUTPATIENT SERVICES | Facility: HOSPITAL | Age: 88
LOS: 1 days | End: 2023-02-23
Payer: MEDICARE

## 2023-02-23 ENCOUNTER — APPOINTMENT (OUTPATIENT)
Dept: CT IMAGING | Facility: IMAGING CENTER | Age: 88
End: 2023-02-23
Payer: MEDICARE

## 2023-02-23 DIAGNOSIS — Z90.49 ACQUIRED ABSENCE OF OTHER SPECIFIED PARTS OF DIGESTIVE TRACT: Chronic | ICD-10-CM

## 2023-02-23 DIAGNOSIS — R93.89 ABNORMAL FINDINGS ON DIAGNOSTIC IMAGING OF OTHER SPECIFIED BODY STRUCTURES: ICD-10-CM

## 2023-02-23 DIAGNOSIS — Z98.89 OTHER SPECIFIED POSTPROCEDURAL STATES: Chronic | ICD-10-CM

## 2023-02-23 PROCEDURE — 77080 DXA BONE DENSITY AXIAL: CPT

## 2023-02-23 PROCEDURE — 71250 CT THORAX DX C-: CPT | Mod: 26

## 2023-02-23 PROCEDURE — 71250 CT THORAX DX C-: CPT

## 2023-02-23 PROCEDURE — 77080 DXA BONE DENSITY AXIAL: CPT | Mod: 26

## 2023-03-14 ENCOUNTER — APPOINTMENT (OUTPATIENT)
Dept: PULMONOLOGY | Facility: CLINIC | Age: 88
End: 2023-03-14
Payer: MEDICARE

## 2023-03-14 ENCOUNTER — APPOINTMENT (OUTPATIENT)
Dept: PULMONOLOGY | Facility: CLINIC | Age: 88
End: 2023-03-14

## 2023-03-14 VITALS
HEIGHT: 63 IN | OXYGEN SATURATION: 97 % | TEMPERATURE: 97.7 F | BODY MASS INDEX: 24.54 KG/M2 | RESPIRATION RATE: 15 BRPM | DIASTOLIC BLOOD PRESSURE: 75 MMHG | HEART RATE: 65 BPM | WEIGHT: 138.5 LBS | SYSTOLIC BLOOD PRESSURE: 149 MMHG

## 2023-03-14 DIAGNOSIS — J45.909 UNSPECIFIED ASTHMA, UNCOMPLICATED: ICD-10-CM

## 2023-03-14 PROCEDURE — 99215 OFFICE O/P EST HI 40 MIN: CPT | Mod: 25

## 2023-03-14 PROCEDURE — 94726 PLETHYSMOGRAPHY LUNG VOLUMES: CPT

## 2023-03-14 PROCEDURE — 94729 DIFFUSING CAPACITY: CPT

## 2023-03-14 PROCEDURE — 94010 BREATHING CAPACITY TEST: CPT

## 2023-03-14 PROCEDURE — ZZZZZ: CPT

## 2023-03-14 NOTE — PHYSICAL EXAM
[No Acute Distress] : no acute distress [Normal Oropharynx] : normal oropharynx [Normal Appearance] : normal appearance [No Neck Mass] : no neck mass [Normal Rate/Rhythm] : normal rate/rhythm [Normal S1, S2] : normal s1, s2 [No Resp Distress] : no resp distress [Clear to Auscultation Bilaterally] : clear to auscultation bilaterally [No Abnormalities] : no abnormalities [Benign] : benign [Normal Gait] : normal gait [No Clubbing] : no clubbing [No Cyanosis] : no cyanosis [No Edema] : no edema [FROM] : FROM [No Focal Deficits] : no focal deficits [Normal Color/ Pigmentation] : normal color/ pigmentation [Oriented x3] : oriented x3 [Normal Affect] : normal affect [TextBox_54] : murmur noted

## 2023-03-14 NOTE — HISTORY OF PRESENT ILLNESS
[Never] : never [TextBox_4] : This letter  is regarding your patient  who  attended pulmonary out patient office today.  I have reviewed  patient's  past history, social history, family history and medication list. I also  reviewed nurse practitioners/ and fellows  notes and assessment and agree with it.  \par The patient was referred by Dr. LISE ODONNELL---- 86-year-old lady diagnosed with sarcoidosis in 1980 S to the mediastinum lymph node biopsy in [place] ----history of asthma----history of Wegener's granulomatosis with renal involvement-----type 2 diabetes------ chronic kidney disease------has history of recurrent CVAs----following up with Dr. Goldberg for her Wegener's--- previously was on azathioprine but that has been stopped--by her rheumatologist\par \par ------No history of , fever, chills , rigors, chest pain, or hemoptysis. Questionable history of Raynaud's phenomenon. No h/o significant weight loss in last few months. No history of liver dysfunction , collagen vascular disorder or chronic thromboembolic disease. I would classify the patient's dyspnea as WHO  FUNCTIONAL CLASS II--------\par \par \par ----Pft date----3/2023 normal lung volumes, decreased dlco\par \par ----Ct scan date-----4/2021 IMPRESSION:\par No evidence of pneumonia.\par Chronic reticular opacities in the right lower lobe are unchanged from 12/01/2008.\par Chronic linear scarring in the left lower lobe and lingula is unchanged from 12/01/2008.--\par \par \par EXAM: CT CHESTROCEDURE DATE: 12/22/2021\par Mediastinum/Vessels/Heart: Enlarged pulmonary artery suggestive of underlying pulmonary hypertension without change from prior.. There is no pericardial effusion. No lymphadenopathy. Thyroid gland is unremarkable\par Lungs/Pleura/Airways: Bilateral calcified pleural plaques are seen, unchanged since prior. Unchanged peripheral groundglass abnormality in the right lower lobe. Reticular opacities in the left lower lobe with focal area of volume loss without change from previous exam may represent an area of rounded atelectasis or sequela of prior hemothorax, without change in appearance.\par Focal area of reticulation/fibrosis in the right upper lobe on series 2 image 35 is unchanged. A right lower lobe 6 mm nodule on series 2 image 84 is unchanged.\par \par IMPRESSION:\par When compared with the previous exam of April 15, 2021, there is no change with details as described above\par \par \par \par ct chest 12/2021 \par Lungs/Pleura/Airways: Bilateral calcified pleural plaques are seen, unchanged since prior. Unchanged peripheral groundglass abnormality in the right lower lobe. Reticular opacities in the left lower lobe with focal area of volume loss without change from previous exam may represent an area of rounded atelectasis or sequela of prior hemothorax, without change in appearance.\par \par Focal area of reticulation/fibrosis in the right upper lobe on series 2 image 35 is unchanged. A right lower lobe 6 mm nodule on series 2 image 84 is unchanged.\par \par ct chest 2/2023\par IMPRESSION:\par \par 1. Since 4/15/2021, the bilateral reticular, groundglass opacities and bronchiectasis are unchanged.\par 2. Since 4/15/2021, the pleural calcifications (left greater than right) and opacity along the right major fissure are unchanged.\par \par echo 4/2021 Hemodynamic: Estimated right atral pressure is normal.\par No evidence of pulmonary hypertension. \par No PFO seen with color Doppler.\par ------------------------------------------------------------------------\par Conclusions: \par Normal left ventricular systolic function. Probably no\par segmental wall motion abnormalities\par \par \par 3/2023 bone density- osteopenia\par \par jan 21 2022---------on prednsisoen 5 mg po qd----   ct dec 2021  unchanged -----\par \par 9/2022 ttm with pt\par c/o wheezing/cough- worse in am- exacerbated by change in weather\par CTD- follows rheum Dr Goldberg- on pred 5mg\par \par FEB 2023----no respiratory complaints well-balanced on prednisone 5 mg needs DEXA scan --- - -  also follow-up with Dr. Goldberg in rheumatology\par \par \par 3/2023 URI from last visit resolved- no new pulm compaints

## 2023-03-14 NOTE — DISCUSSION/SUMMARY
[FreeTextEntry1] : ---Assessment plan----------The patient has been referred here for further opinion regarding pulmonary problem,\par   87-year-old lady diagnosed with sarcoidosis in 1980 S to the mediastinum lymph node biopsy in [place] ----history of asthma----history of Wegener's granulomatosis with renal involvement-----type 2 diabetes------ chronic kidney disease------has history of recurrent CVAs----following up with Dr. Goldberg for her Wegener's GRANULOMATOSIS --- previously was on azathioprine but that has been stopped--by her rheumatologist\par CXR  Reviewed  chest x-ray with the radiologist\par CT CHEST  DEC  2021---unchanged   b/l pleural calcification,  some areas of  the reticulations=---SUGGESTIVE OF ILD \par \par 1---REMOTE H/O  history of sarcoidosis---AND Tressa GRANULOMATOSIS--remains on a low dose prednisone\par 2 osteopenia- advised calcium w/vit D daily\par 3 murmur on exam- known history- referred to cardiology, needs echo\par 4 -history of Wegener's granulomatosis with renal involvement---F/U WITH NEPHROLOGY , DR ODONNELL, RHEUMATOLOGY \par 5  CH KIDNEY  DISEASE-----F/U WITH ALI \par 6 TYPE  II DM--F/U METFORMIN\par 7- f/u in 5-6 m or sooner if any problems\par \par \par \par Thanks for allowing  me to participate  in the care of this patient.  Patient at this time  will follow  the above mentioned recommendations and return back for follow up visit. If you have any questions  I can be reached  at # [phone] (office).\par \par \par \par

## 2023-04-18 ENCOUNTER — APPOINTMENT (OUTPATIENT)
Dept: CARDIOLOGY | Facility: CLINIC | Age: 88
End: 2023-04-18
Payer: MEDICARE

## 2023-04-18 ENCOUNTER — NON-APPOINTMENT (OUTPATIENT)
Age: 88
End: 2023-04-18

## 2023-04-18 VITALS
DIASTOLIC BLOOD PRESSURE: 75 MMHG | BODY MASS INDEX: 23.74 KG/M2 | HEART RATE: 87 BPM | WEIGHT: 134 LBS | SYSTOLIC BLOOD PRESSURE: 125 MMHG | OXYGEN SATURATION: 96 %

## 2023-04-18 PROCEDURE — 99204 OFFICE O/P NEW MOD 45 MIN: CPT | Mod: 25

## 2023-04-18 PROCEDURE — 93000 ELECTROCARDIOGRAM COMPLETE: CPT

## 2023-04-18 NOTE — CARDIOLOGY SUMMARY
[de-identified] : 4/16/2021, normal LA, no PFO seen, no evidence of pulmonary hypertension, normal LV systolic function, VLEF 65%

## 2023-04-18 NOTE — DISCUSSION/SUMMARY
[FreeTextEntry1] : 88 year-old woman presents for evaluation after a systolic murmur was noted on exam. \par She denies any history of syncope and collapse.\par She has no chest pains, but reports dyspnea with minimal exertion. \par \par Will check TTE to evaluate for cardiomyopathy or valvular heart disease. [EKG obtained to assist in diagnosis and management of assessed problem(s)] : EKG obtained to assist in diagnosis and management of assessed problem(s)

## 2023-04-18 NOTE — HISTORY OF PRESENT ILLNESS
[FreeTextEntry1] : Ms. King is an 88 year-old woman, born in [place] (with ancestry that is , [place], and ), with past medical history of pulmonary sarcoidosis (1980), Wegener's granulomatosis (followed by Avram Goldberg, MD), non-insulin dependent type II diabetes, CKD (followed by Kane Thompson MD), who presents today for follow-up after a systolic murmur was noted on exam. Patient reports that the murmur has been present for many years.\par She reports dyspnea with exertion, but sometimes even light housework causes dyspnea. \par She gets occasional dizziness and light headedness. She notices it most in the mornings, when she rises from a sitting or laying position. It is better if she moves slowly. \par She has no personal history of syncope.

## 2023-04-18 NOTE — PHYSICAL EXAM
[Well Developed] : well developed [Well Nourished] : well nourished [No Acute Distress] : no acute distress [Normal Conjunctiva] : normal conjunctiva [Normal Venous Pressure] : normal venous pressure [No Carotid Bruit] : no carotid bruit [Normal S1, S2] : normal S1, S2 [No Rub] : no rub [No Gallop] : no gallop [II] : a grade 2 [I] : a grade 1 [Clear Lung Fields] : clear lung fields [Good Air Entry] : good air entry [No Respiratory Distress] : no respiratory distress  [Soft] : abdomen soft [Non Tender] : non-tender [No Masses/organomegaly] : no masses/organomegaly [Normal Bowel Sounds] : normal bowel sounds [Normal Gait] : normal gait [No Edema] : no edema [No Cyanosis] : no cyanosis [No Clubbing] : no clubbing [No Varicosities] : no varicosities [No Rash] : no rash [No Skin Lesions] : no skin lesions [Moves all extremities] : moves all extremities [No Focal Deficits] : no focal deficits [Normal Speech] : normal speech [Alert and Oriented] : alert and oriented [Normal memory] : normal memory

## 2023-04-24 ENCOUNTER — APPOINTMENT (OUTPATIENT)
Dept: CARDIOLOGY | Facility: CLINIC | Age: 88
End: 2023-04-24
Payer: MEDICARE

## 2023-04-24 PROCEDURE — 93306 TTE W/DOPPLER COMPLETE: CPT

## 2023-06-05 ENCOUNTER — APPOINTMENT (OUTPATIENT)
Dept: PULMONOLOGY | Facility: CLINIC | Age: 88
End: 2023-06-05

## 2023-07-17 ENCOUNTER — NON-APPOINTMENT (OUTPATIENT)
Age: 88
End: 2023-07-17

## 2023-07-17 ENCOUNTER — APPOINTMENT (OUTPATIENT)
Dept: CARDIOLOGY | Facility: CLINIC | Age: 88
End: 2023-07-17
Payer: MEDICARE

## 2023-07-17 VITALS
DIASTOLIC BLOOD PRESSURE: 82 MMHG | SYSTOLIC BLOOD PRESSURE: 132 MMHG | HEART RATE: 81 BPM | BODY MASS INDEX: 23.91 KG/M2 | WEIGHT: 135 LBS | OXYGEN SATURATION: 100 %

## 2023-07-17 DIAGNOSIS — R07.89 OTHER CHEST PAIN: ICD-10-CM

## 2023-07-17 PROCEDURE — 93000 ELECTROCARDIOGRAM COMPLETE: CPT

## 2023-07-17 PROCEDURE — 99214 OFFICE O/P EST MOD 30 MIN: CPT | Mod: 25

## 2023-07-17 NOTE — REASON FOR VISIT
[Other: ____] : [unfilled] [FreeTextEntry3] : Cris Borges MD [FreeTextEntry1] : July 2023 - Patient returns today for follow-up in her usual state of health.\par She had a mechanical fall out of a chair at home approximately two weeks ago. She hurt her right arm, but it is just bruised. She did not hit her head. She did not lose consciousness. \par Unrelated to her fall, she reports occasional discomfort at the site of her ILR.

## 2023-07-17 NOTE — DISCUSSION/SUMMARY
[EKG obtained to assist in diagnosis and management of assessed problem(s)] : EKG obtained to assist in diagnosis and management of assessed problem(s) [FreeTextEntry1] : 88 year-old woman presents for evaluation after a systolic murmur was noted on exam. \par She denies any history of syncope and collapse.\par She has no anginal chest pain, but reports dyspnea with minimal exertion. \par TTE from April 2023 reviewed showing mild aortic stenosis. Murmur is likely a combination of mild AS and hyperdynamic LV.\par \par Labs per PMD, rheumatology, and nephrology.\par \par She has tenderness at her loop recorder site.\par Will ask EP to explant her ILR. No indication to have it replaced at this time.

## 2023-07-17 NOTE — CARDIOLOGY SUMMARY
[de-identified] : 7/17/2023, sinus rhythm, old inferior MI, poor R-wave progression, low voltage in precordial leads [de-identified] : 4/16/2021, normal LA, no PFO seen, no evidence of pulmonary hypertension, normal LV systolic function, VLEF 65%\par 4/24/2023, normal LA, calcified aortic valve, peak gradient 29 mmHg, mean gradient 11 mmHg, PIPER 1.7 sqcm by continuity, normal pulmonary pressures, hyperdynamic LV systolic function, LVEF 70-75% [de-identified] : 6/7/2019, [employer] LINQ implantable loop recorder placed by Prince Dawkins MD at [facility]

## 2023-07-17 NOTE — HISTORY OF PRESENT ILLNESS
[FreeTextEntry1] : Ms. King is an 88 year-old woman, born in [place] (with ancestry that is , [place], and ), with past medical history of pulmonary sarcoidosis (1980), Wegener's granulomatosis (followed by Avram Goldberg, MD), non-insulin dependent type II diabetes, CKD (followed by Kane Thompson MD), who presents today for follow-up after a systolic murmur was noted on exam. Patient reports that the murmur has been present for many years.\par She reports dyspnea with exertion, but sometimes even light housework causes dyspnea. \par She gets occasional dizziness and light headedness. She notices it most in the mornings, when she rises from a sitting or laying position. It is better if she moves slowly. \par She has no personal history of syncope.

## 2023-08-02 ENCOUNTER — EMERGENCY (EMERGENCY)
Facility: HOSPITAL | Age: 88
LOS: 1 days | Discharge: HOME | End: 2023-08-02
Attending: EMERGENCY MEDICINE
Payer: MEDICARE

## 2023-08-02 VITALS
OXYGEN SATURATION: 100 % | TEMPERATURE: 98 F | HEART RATE: 73 BPM | DIASTOLIC BLOOD PRESSURE: 77 MMHG | RESPIRATION RATE: 17 BRPM | WEIGHT: 134.92 LBS | SYSTOLIC BLOOD PRESSURE: 129 MMHG

## 2023-08-02 VITALS
RESPIRATION RATE: 18 BRPM | SYSTOLIC BLOOD PRESSURE: 115 MMHG | TEMPERATURE: 98 F | HEART RATE: 75 BPM | OXYGEN SATURATION: 99 % | DIASTOLIC BLOOD PRESSURE: 73 MMHG

## 2023-08-02 DIAGNOSIS — Z98.89 OTHER SPECIFIED POSTPROCEDURAL STATES: Chronic | ICD-10-CM

## 2023-08-02 DIAGNOSIS — Z90.49 ACQUIRED ABSENCE OF OTHER SPECIFIED PARTS OF DIGESTIVE TRACT: Chronic | ICD-10-CM

## 2023-08-02 PROCEDURE — 93005 ELECTROCARDIOGRAM TRACING: CPT

## 2023-08-02 PROCEDURE — 99284 EMERGENCY DEPT VISIT MOD MDM: CPT

## 2023-08-02 PROCEDURE — 73562 X-RAY EXAM OF KNEE 3: CPT | Mod: 26,LT

## 2023-08-02 PROCEDURE — 73030 X-RAY EXAM OF SHOULDER: CPT

## 2023-08-02 PROCEDURE — 73130 X-RAY EXAM OF HAND: CPT

## 2023-08-02 PROCEDURE — 73130 X-RAY EXAM OF HAND: CPT | Mod: 26,LT

## 2023-08-02 PROCEDURE — 73030 X-RAY EXAM OF SHOULDER: CPT | Mod: 26,RT

## 2023-08-02 PROCEDURE — 71250 CT THORAX DX C-: CPT | Mod: MA

## 2023-08-02 PROCEDURE — 73562 X-RAY EXAM OF KNEE 3: CPT

## 2023-08-02 PROCEDURE — 99284 EMERGENCY DEPT VISIT MOD MDM: CPT | Mod: 25

## 2023-08-02 PROCEDURE — 71250 CT THORAX DX C-: CPT | Mod: 26,MA

## 2023-08-02 RX ORDER — ACETAMINOPHEN 500 MG
650 TABLET ORAL ONCE
Refills: 0 | Status: COMPLETED | OUTPATIENT
Start: 2023-08-02 | End: 2023-08-02

## 2023-08-02 RX ORDER — ERYTHROMYCIN BASE 5 MG/GRAM
1 OINTMENT (GRAM) OPHTHALMIC (EYE) ONCE
Refills: 0 | Status: DISCONTINUED | OUTPATIENT
Start: 2023-08-02 | End: 2023-08-02

## 2023-08-02 RX ADMIN — Medication 650 MILLIGRAM(S): at 14:24

## 2023-08-02 NOTE — ED PROVIDER NOTE - EXTREMITY EXAM
Bruising and swelling to L knee most notable over patella with minimal tenderness. Full AROM knee. No other deformities of extremities. Full AROM UE/LB b/l all joints/muscle groups with 5/5 strength. Sensation intact.

## 2023-08-02 NOTE — ED PROVIDER NOTE - PATIENT PORTAL LINK FT
You can access the FollowMyHealth Patient Portal offered by [facility] by registering at the following website: [email or web address]. By joining [employer]’s FollowMyHealth portal, you will also be able to view your health information using other applications (apps) compatible with our system.

## 2023-08-02 NOTE — ED ADULT NURSE NOTE - PAIN: ALLEVIATING FACTORS
-- DO NOT REPLY / DO NOT REPLY ALL --  -- Message is from the [facility]--    Patient is requesting a medication refill - medication is on active medication list    Patient is currently OUT of the requested medication.  Patient had scheduled appointment with Dr. Travon Mosquera, 9/20/2021 at 11:00am.    Was Medication Pended?  Yes.    Rx Name and Dose:  HYDROcodone-acetaminophen (NORCO)  MG per tablet    Duration: 30 days    Pharmacy  [id number] [address] [facility] [address] [facility]    Patient confirmed the above pharmacy as correct?  Yes    Caller Information       Type Contact Phone    07/19/2021 08:43 AM CDT Phone (Incoming) Gareth Ventura (Self) [phone] (M)          Alternative phone number: n/a    Turnaround time given to caller:   \"This message will be sent to [state Provider's name]. The clinical team will fulfill your request as soon as they review your message.\"   medications, over the counter (OTC)

## 2023-08-02 NOTE — ED ADULT NURSE NOTE - NSFALLRISKASMT_ED_ALL_ED_DT
Detail Level: Detailed
Quality 110: Preventive Care And Screening: Influenza Immunization: Influenza Immunization Administered during Influenza season
02-Aug-2023 13:48

## 2023-08-02 NOTE — ED ADULT NURSE NOTE - PAIN: BODY LOCATION
Nutrition Problem #1: Inadequate oral intake  Intervention: Food and/or Nutrient Delivery: Continue Current Diet, Start Oral Nutrition Supplement  Nutritional Goals: Pt will consume greater than 50=75% of meals and supplements r chest

## 2023-08-02 NOTE — ED PROVIDER NOTE - NS ED MD DISPO DISCHARGE
Patient Education     Acute Viral Pharyngitis (Sore Throat)    You or your child have a sore throat (pharyngitis). This infection is caused by a virus. It can cause throat pain that is worse when swallowing, aching all over, headache, and fever. The infection may be spread by coughing, kissing, or touching others after touching your mouth or nose. Antibiotic medicines don't work against viruses. They are not used for treating this illness.   Home care  · If symptoms are severe, you or your child should rest at home. Return to work or school when you, or your child, feel well enough.   · You or your child should drink plenty of fluids to prevent dehydration.  · Adults, and children 5 years and older, can use throat lozenges or numbing throat sprays to help reduce pain. Gargling with warm salt water will also help reduce throat pain. Dissolve 1/2 teaspoon of salt in 1 glass of warm water. Children can sip on juice or an ice pop. Children 5 years and older can also suck on a lollipop or hard candy. (Hard candy and lozenges can be a choking hazard in children younger than 5 years.)  · Don’t eat salty or spicy foods or give them to your child. These can be irritating to the throat.  Medicines for a child: You can give your child acetaminophen for fever, fussiness, or discomfort. In babies over 6 months of age, you may use ibuprofen as well as acetaminophen. If your child has chronic liver or kidney disease or ever had a stomach ulcer or gastrointestinal bleeding, talk with your child’s healthcare provider before giving these medicines. Aspirin should never be used by any child under 18 years of age who has a fever. It may cause severe liver damage and death. Don't give your child any other medicine without first asking your child's healthcare provider, especially the first time.   Medicines for an adult: You may use acetaminophen, naproxen, or ibuprofen to control pain or fever, unless another medicine was prescribed for  this. If you have chronic liver or kidney disease or ever had a stomach ulcer or gastrointestinal bleeding, talk with your healthcare provider before using these medicines.   Follow-up care  Follow up with a healthcare provider or as advised if you or your child are not getting better over the next week.   When to get medical advice  Call your healthcare provider right away if any of these occur:   · Fever (see Fever and children, below)   · New or worsening ear pain, sinus pain, or headache  · Painful lumps in the back of neck  · Stiff neck  · Lymph nodes are getting larger  · Can’t open mouth wide due to throat pain  · New rash  · Other symptoms are getting worse  Call 911  Call 911 or get medical care right away if any of these occur:     · Trouble breathing or noisy breathing  · Muffled voice  · Can't swallow liquids, a lot of drooling, or any other symptoms that may mean worsening swelling in the throat  · Signs of dehydration such as very dark urine or no urine, sunken eyes, dizziness    Fever and children  Use a digital thermometer to check your child’s temperature. Don’t use a mercury thermometer. There are different kinds and uses of digital thermometers. They include:   · Rectal. For children younger than 3 years, a rectal temperature is the most accurate.  · Forehead (temporal).  This works for children age 3 months and older. If a child under 3 months old has signs of illness, this can be used for a first pass. The provider may want to confirm with a rectal temperature.  · Ear (tympanic). Ear temperatures are accurate after 6 months of age, but not before.  · Armpit (axillary).  This is the least reliable but may be used for a first pass to check a child of any age with signs of illness. The provider may want to confirm with a rectal temperature.  · Mouth (oral). Don’t use a thermometer in your child’s mouth until he or she is at least 4 years old.  Use the rectal thermometer with care. Follow the  product maker’s directions for correct use. Insert it gently. Label it and make sure it’s not used in the mouth. It may pass on germs from the stool. If you don’t feel OK using a rectal thermometer, ask the healthcare provider what type to use instead. When you talk with any healthcare provider about your child’s fever, tell him or her which type you used.   Below are guidelines to know if your young child has a fever. Your child’s healthcare provider may give you different numbers for your child. Follow your provider’s specific instructions.   Fever readings for a baby under 3 months old:   · First, ask your child’s healthcare provider how you should take the temperature.  · Rectal or forehead: 100.4°F (38°C) or higher  · Armpit: 99°F (37.2°C) or higher  Fever readings for a child age 3 months to 36 months (3 years):   · Rectal, forehead, or ear: 102°F (38.9°C) or higher  · Armpit: 101°F (38.3°C) or higher  Call the healthcare provider in these cases:   · Repeated temperature of 104°F (40°C) or higher in a child of any age  · Fever of 100.4 or higher in baby younger than 3 months  · Fever that lasts more than 24 hours in a child under age 2  · Fever that lasts for 3 days in a child age 2 or older  [employer] last reviewed this educational content on 2/1/2020  © 5784-2403 [employer]. All rights reserved. This information is not intended as a substitute for professional medical care. Always follow your healthcare professional's instructions.              Home

## 2023-08-02 NOTE — ED PROVIDER NOTE - NSFOLLOWUPINSTRUCTIONS_ED_ALL_ED_FT
Follow-up with your primary care doctor in the next 2-3 days for reevaluation.    Recommend deep breathing exercises as advised.    For pain take Tylenol 650 mg every 6 hours as needed.  You may additionally apply ice to any area of pain for additional relief.    Return to the Emergency Department immediately if you develop any new/worsening symptoms including but not limited to difficulty breathing, chest pain, vomiting, abdominal pain, inability to walk or any other concerning symptoms.

## 2023-08-02 NOTE — ED PROVIDER NOTE - OBJECTIVE STATEMENT
87 yo female PMHx sarcoidosis, type 2 diabetes, CVA/TIA, HTN, HLD on daily aspirin presents to the ED complaining of right breast/rib pain status post fall.  Patient was walking in house last night and tripped over wire, fell forward onto right chest wall and left knee.  States had to crawl to bed and help her self up.  Has been ambulatory since the fall.  Denies any preceding dizziness/lightheadedness, weakness, chest pain, shortness of breath.  No head trauma or LOC.  Currently complaining of pain to right chest wall worse with inspiration.  Additionally has bruising to left knee although denies pain at the site.  Endorses chronic back pain unchanged from previous.  Denies speech/visual changes, n/v, weakness, numbness/tingling, sob, fever/chills, neck pain.

## 2023-08-02 NOTE — ED PROVIDER NOTE - PROGRESS NOTE DETAILS
CT without rib fractures, noted incidental findings listed including right lower lobe marc-fissural nodule and made pt aware of these findings and need to f/u. Xrays negative for acute fracture. Pt ambulatory at her baseline and feels comfortable w/ discharge home. D/w pt's son Miki who will send friend to pick pt up. Pt aware and comfortable w/ discharge home. - Joshua Franz PA-C CT without rib fractures, noted incidental findings listed including right lower lobe marc-fissural nodule and made pt aware of these findings and need to f/u. Xrays negative for acute fracture. Pt ambulatory at her baseline and feels comfortable w/ discharge home. D/w pt's son Miki who will send someone to pick pt up. Pt aware and comfortable w/ discharge home. - Joshua Franz PA-C pt's nephew here to take him home. Pt advised on strict return precautions and outpt f/u. All questions answered. ED attending aware, stable for d/c. - Joshua Franz PA-C

## 2023-08-02 NOTE — ED PROVIDER NOTE - CLINICAL SUMMARY MEDICAL DECISION MAKING FREE TEXT BOX
Elderly F c R chest wall pain after mechanical fall.  Not on AC.  No head strike.  Ground level fall.  Primary survey WNL, secondary shows contusions to b/l knees and mild R chest wall tenderness to palpation without ecchymosis, crepitation, flail segment.  Will need CT chest, XR shoulder, knee, hand, pain control.  Hopeful d/c presuming nonactionable clinical course and imaging.  --BMM

## 2023-08-02 NOTE — ED ADULT NURSE NOTE - OBJECTIVE STATEMENT
Y F AXO3 PMH of HTN and HLD and no pertinent PSH presented to the ED c/o fall last night. Pt states she "tripped over fan wire while going to the bathroom." Pt reports falling on her knees and chest. Pt reports she takes daily baby aspirin. Pt endorses R sided chest pain when breathing. Pt denies LOC or head strike. Pt denies chest pain, SOB, dizziness or lightheadedness prior to fall. Upon arrival to the ED, the pt is well appearing, has bilateral, even and unlabored chest rise, and ambulatory with cane assistance. Upon assessment, pt has even and bilateral peripheral pulses, ROM, PERRLA, gross neuro intact, and soft, non-tender, non-distended abdomen. Bruising of L knee noted. Pt denies fevers, chills, n/v/d, numbness or tingling of extremities, pain or burning on urination, and black or bloody stools. Comfort and safety provided.

## 2023-08-02 NOTE — ED ADULT NURSE NOTE - NSFALLHARMRISKINTERV_ED_ALL_ED

## 2023-08-21 ENCOUNTER — APPOINTMENT (OUTPATIENT)
Dept: PULMONOLOGY | Facility: CLINIC | Age: 88
End: 2023-08-21
Payer: MEDICARE

## 2023-08-21 VITALS
WEIGHT: 134 LBS | SYSTOLIC BLOOD PRESSURE: 148 MMHG | OXYGEN SATURATION: 91 % | DIASTOLIC BLOOD PRESSURE: 80 MMHG | RESPIRATION RATE: 15 BRPM | HEIGHT: 63 IN | TEMPERATURE: 97 F | BODY MASS INDEX: 23.74 KG/M2 | HEART RATE: 77 BPM

## 2023-08-21 PROCEDURE — 99215 OFFICE O/P EST HI 40 MIN: CPT

## 2023-08-21 RX ORDER — FLUTICASONE PROPIONATE 50 UG/1
50 SPRAY, METERED NASAL DAILY
Qty: 1 | Refills: 3 | Status: ACTIVE | COMMUNITY
Start: 2023-08-21 | End: 1900-01-01

## 2023-08-21 NOTE — HISTORY OF PRESENT ILLNESS
[Never] : never [TextBox_4] : This letter  is regarding your patient  who  attended pulmonary out patient office today.  I have reviewed  patient's  past history, social history, family history and medication list. I also  reviewed nurse practitioners/ and fellows  notes and assessment and agree with it.   The patient was referred by Dr. LISE ODONNELL---- 86-year-old lady diagnosed with sarcoidosis in 1980 S to the mediastinum lymph node biopsy in [place] ----history of asthma----history of Wegener's granulomatosis with renal involvement-----type 2 diabetes------ chronic kidney disease------has history of recurrent CVAs----following up with Dr. Goldberg for her Wegener's--- previously was on azathioprine but that has been stopped--by her rheumatologist  ------No history of , fever, chills , rigors, chest pain, or hemoptysis. Questionable history of Raynaud's phenomenon. No h/o significant weight loss in last few months. No history of liver dysfunction , collagen vascular disorder or chronic thromboembolic disease. I would classify the patient's dyspnea as WHO  FUNCTIONAL CLASS II--------   ----Pft date--------- pending ----Ct scan date-----4/2021 IMPRESSION: No evidence of pneumonia. Chronic reticular opacities in the right lower lobe are unchanged from 12/01/2008. Chronic linear scarring in the left lower lobe and lingula is unchanged from 12/01/2008.--   EXAM: CT CHESTROCEDURE DATE: 12/22/2021 Mediastinum/Vessels/Heart: Enlarged pulmonary artery suggestive of underlying pulmonary hypertension without change from prior.. There is no pericardial effusion. No lymphadenopathy. Thyroid gland is unremarkable Lungs/Pleura/Airways: Bilateral calcified pleural plaques are seen, unchanged since prior. Unchanged peripheral groundglass abnormality in the right lower lobe. Reticular opacities in the left lower lobe with focal area of volume loss without change from previous exam may represent an area of rounded atelectasis or sequela of prior hemothorax, without change in appearance. Focal area of reticulation/fibrosis in the right upper lobe on series 2 image 35 is unchanged. A right lower lobe 6 mm nodule on series 2 image 84 is unchanged.  IMPRESSION: When compared with the previous exam of April 15, 2021, there is no change with details as described above    ct chest 12/2021  Lungs/Pleura/Airways: Bilateral calcified pleural plaques are seen, unchanged since prior. Unchanged peripheral groundglass abnormality in the right lower lobe. Reticular opacities in the left lower lobe with focal area of volume loss without change from previous exam may represent an area of rounded atelectasis or sequela of prior hemothorax, without change in appearance.  Focal area of reticulation/fibrosis in the right upper lobe on series 2 image 35 is unchanged. A right lower lobe 6 mm nodule on series 2 image 84 is unchanged.   echo 4/2021 Hemodynamic: Estimated right atral pressure is normal. No evidence of pulmonary hypertension.  No PFO seen with color Doppler. ------------------------------------------------------------------------ Conclusions:  Normal left ventricular systolic function. Probably no segmental wall motion abnormalities  jan 21 2022---------on prednsisoen 5 mg po qd----   ct dec 2021  unchanged -----  9/2022 ttm with pt c/o wheezing/cough- worse in am- exacerbated by change in weather CTD- follows rheum Dr Goldberg- on pred 5mg  FEB 2023----no respiratory complaints well-balanced on prednisone 5 mg needs DEXA scan --- - -  also follow-up with Dr. Goldberg in rheumatology  8/2023 c/o cough - post nasal drip accompanied by HHA Is likely moving to [facility]

## 2023-08-21 NOTE — DISCUSSION/SUMMARY
[FreeTextEntry1] : ---Assessment plan----------The patient has been referred here for further opinion regarding pulmonary problem,   88-year-old lady diagnosed with sarcoidosis in 1980 S to the mediastinum lymph node biopsy in [place] ----history of asthma----history of Wegener's granulomatosis with renal involvement-----type 2 diabetes------ chronic kidney disease------has history of recurrent CVAs----following up with Dr. Goldberg for her Wegener's GRANULOMATOSIS --- previously was on azathioprine but that has been stopped--by her rheumatologist CXR  Reviewed  chest x-ray with the radiologist CT CHEST  DEC  2021---unchanged   b/l pleural calcification,  some areas of  the reticulations=---SUGGESTIVE OF ILD   1---REMOTE H/O  history of sarcoidosis---AND [place] GRANULOMATOSIS--annual CT CHEST---no evidence of active vasculitis---------- 2 ASTHMA/HYPERACTOEV AIRWAYS - - - - use symbicort w/spacer- instructed on proper use and flonase for PND 3 osteoporosis/osteopenia----following up with Dr. Goldberg 4- she is up to date w/covid vac 5- -history of Wegener's granulomatosis with renal involvement---F/U WITH NEPHROLOGY , DR ODONNELL, RHEUMATOLOGY  6  CH KIDNEY  DISEASE-----F/U WITH ALI  7 TYPE  II DM--F/U METFORMIN 8- will establish care in [place]    Thanks for allowing  me to participate  in the care of this patient.  Patient at this time  will follow  the above mentioned recommendations and return back for follow up visit. If you have any questions  I can be reached  at # [phone] (office).  Cris Borges MD, [facility]

## 2023-08-21 NOTE — END OF VISIT
[FreeTextEntry3] :   I, Dr. Cris Borges  personally performed the evaluation and management (E/M) services for this established patient who presents today with (a) new problem(s)/exacerbation of (an) existing condition(s). That E/M includes conducting the clinically appropriate interval history &/or exam, assessing all new/exacerbated conditions, and establishing a new plan of care. Today, my WHITNEY, Aleshia Riley NP, , was here to observe my evaluation and management service for this new problem/exacerbated condition and follow the plan of care established by me going forward. [Time Spent: ___ minutes] : I have spent [unfilled] minutes of time on the encounter.

## 2023-08-30 ENCOUNTER — EMERGENCY (EMERGENCY)
Facility: HOSPITAL | Age: 88
LOS: 0 days | Discharge: HOME | End: 2023-08-31
Attending: STUDENT IN AN ORGANIZED HEALTH CARE EDUCATION/TRAINING PROGRAM
Payer: MEDICARE

## 2023-08-30 VITALS
DIASTOLIC BLOOD PRESSURE: 78 MMHG | RESPIRATION RATE: 18 BRPM | SYSTOLIC BLOOD PRESSURE: 123 MMHG | OXYGEN SATURATION: 97 % | HEART RATE: 95 BPM | WEIGHT: 134.92 LBS | TEMPERATURE: 98 F | HEIGHT: 65 IN

## 2023-08-30 DIAGNOSIS — I10 ESSENTIAL (PRIMARY) HYPERTENSION: ICD-10-CM

## 2023-08-30 DIAGNOSIS — R42 DIZZINESS AND GIDDINESS: ICD-10-CM

## 2023-08-30 DIAGNOSIS — E11.9 TYPE 2 DIABETES MELLITUS WITHOUT COMPLICATIONS: ICD-10-CM

## 2023-08-30 DIAGNOSIS — Z88.2 ALLERGY STATUS TO SULFONAMIDES: ICD-10-CM

## 2023-08-30 DIAGNOSIS — Z91.041 RADIOGRAPHIC DYE ALLERGY STATUS: ICD-10-CM

## 2023-08-30 DIAGNOSIS — Z88.6 ALLERGY STATUS TO ANALGESIC AGENT: ICD-10-CM

## 2023-08-30 DIAGNOSIS — Z88.5 ALLERGY STATUS TO NARCOTIC AGENT: ICD-10-CM

## 2023-08-30 DIAGNOSIS — E86.0 DEHYDRATION: ICD-10-CM

## 2023-08-30 DIAGNOSIS — N39.0 URINARY TRACT INFECTION, SITE NOT SPECIFIED: ICD-10-CM

## 2023-08-30 DIAGNOSIS — Z79.82 LONG TERM (CURRENT) USE OF ASPIRIN: ICD-10-CM

## 2023-08-30 DIAGNOSIS — Z98.89 OTHER SPECIFIED POSTPROCEDURAL STATES: Chronic | ICD-10-CM

## 2023-08-30 DIAGNOSIS — Z90.49 ACQUIRED ABSENCE OF OTHER SPECIFIED PARTS OF DIGESTIVE TRACT: Chronic | ICD-10-CM

## 2023-08-30 DIAGNOSIS — Z88.8 ALLERGY STATUS TO OTHER DRUGS, MEDICAMENTS AND BIOLOGICAL SUBSTANCES: ICD-10-CM

## 2023-08-30 DIAGNOSIS — Z86.73 PERSONAL HISTORY OF TRANSIENT ISCHEMIC ATTACK (TIA), AND CEREBRAL INFARCTION WITHOUT RESIDUAL DEFICITS: ICD-10-CM

## 2023-08-30 DIAGNOSIS — Z91.013 ALLERGY TO SEAFOOD: ICD-10-CM

## 2023-08-30 DIAGNOSIS — Z79.84 LONG TERM (CURRENT) USE OF ORAL HYPOGLYCEMIC DRUGS: ICD-10-CM

## 2023-08-30 PROCEDURE — 99284 EMERGENCY DEPT VISIT MOD MDM: CPT

## 2023-08-30 NOTE — ED ADULT TRIAGE NOTE - CHIEF COMPLAINT QUOTE
from home with dizziness , feels like is in the space , problem with the equilibrium live with son .Last time seeing normal at 1930 pm.

## 2023-08-31 VITALS
HEART RATE: 76 BPM | SYSTOLIC BLOOD PRESSURE: 134 MMHG | OXYGEN SATURATION: 100 % | TEMPERATURE: 98 F | RESPIRATION RATE: 15 BRPM | DIASTOLIC BLOOD PRESSURE: 74 MMHG

## 2023-08-31 LAB
ALBUMIN SERPL ELPH-MCNC: 3.5 G/DL — SIGNIFICANT CHANGE UP (ref 3.3–5)
ALP SERPL-CCNC: 73 U/L — SIGNIFICANT CHANGE UP (ref 40–120)
ALT FLD-CCNC: 22 U/L — SIGNIFICANT CHANGE UP (ref 12–78)
ANION GAP SERPL CALC-SCNC: 7 MMOL/L — SIGNIFICANT CHANGE UP (ref 5–17)
APPEARANCE UR: CLEAR — SIGNIFICANT CHANGE UP
AST SERPL-CCNC: 22 U/L — SIGNIFICANT CHANGE UP (ref 15–37)
BACTERIA # UR AUTO: ABNORMAL
BASOPHILS # BLD AUTO: 0.02 K/UL — SIGNIFICANT CHANGE UP (ref 0–0.2)
BASOPHILS NFR BLD AUTO: 0.3 % — SIGNIFICANT CHANGE UP (ref 0–2)
BILIRUB SERPL-MCNC: 0.6 MG/DL — SIGNIFICANT CHANGE UP (ref 0.2–1.2)
BILIRUB UR-MCNC: NEGATIVE — SIGNIFICANT CHANGE UP
BUN SERPL-MCNC: 42 MG/DL — HIGH (ref 7–23)
CALCIUM SERPL-MCNC: 10.2 MG/DL — HIGH (ref 8.5–10.1)
CHLORIDE SERPL-SCNC: 101 MMOL/L — SIGNIFICANT CHANGE UP (ref 96–108)
CO2 SERPL-SCNC: 29 MMOL/L — SIGNIFICANT CHANGE UP (ref 22–31)
COLOR SPEC: YELLOW — SIGNIFICANT CHANGE UP
COMMENT - URINE: SIGNIFICANT CHANGE UP
CREAT SERPL-MCNC: 1.62 MG/DL — HIGH (ref 0.5–1.3)
DIFF PNL FLD: NEGATIVE — SIGNIFICANT CHANGE UP
EGFR: 31 ML/MIN/1.73M2 — LOW
EOSINOPHIL # BLD AUTO: 0.5 K/UL — SIGNIFICANT CHANGE UP (ref 0–0.5)
EOSINOPHIL NFR BLD AUTO: 7 % — HIGH (ref 0–6)
EPI CELLS # UR: SIGNIFICANT CHANGE UP
GLUCOSE SERPL-MCNC: 112 MG/DL — HIGH (ref 70–99)
GLUCOSE UR QL: NEGATIVE MG/DL — SIGNIFICANT CHANGE UP
HCT VFR BLD CALC: 32.7 % — LOW (ref 34.5–45)
HGB BLD-MCNC: 10.3 G/DL — LOW (ref 11.5–15.5)
IMM GRANULOCYTES NFR BLD AUTO: 0.3 % — SIGNIFICANT CHANGE UP (ref 0–0.9)
KETONES UR-MCNC: NEGATIVE — SIGNIFICANT CHANGE UP
LEUKOCYTE ESTERASE UR-ACNC: ABNORMAL
LYMPHOCYTES # BLD AUTO: 1.01 K/UL — SIGNIFICANT CHANGE UP (ref 1–3.3)
LYMPHOCYTES # BLD AUTO: 14.2 % — SIGNIFICANT CHANGE UP (ref 13–44)
MCHC RBC-ENTMCNC: 27.5 PG — SIGNIFICANT CHANGE UP (ref 27–34)
MCHC RBC-ENTMCNC: 31.5 G/DL — LOW (ref 32–36)
MCV RBC AUTO: 87.4 FL — SIGNIFICANT CHANGE UP (ref 80–100)
MONOCYTES # BLD AUTO: 0.78 K/UL — SIGNIFICANT CHANGE UP (ref 0–0.9)
MONOCYTES NFR BLD AUTO: 11 % — SIGNIFICANT CHANGE UP (ref 2–14)
NEUTROPHILS # BLD AUTO: 4.77 K/UL — SIGNIFICANT CHANGE UP (ref 1.8–7.4)
NEUTROPHILS NFR BLD AUTO: 67.2 % — SIGNIFICANT CHANGE UP (ref 43–77)
NITRITE UR-MCNC: POSITIVE
NRBC # BLD: 0 /100 WBCS — SIGNIFICANT CHANGE UP (ref 0–0)
PH UR: 6 — SIGNIFICANT CHANGE UP (ref 5–8)
PLATELET # BLD AUTO: 214 K/UL — SIGNIFICANT CHANGE UP (ref 150–400)
POTASSIUM SERPL-MCNC: 3.3 MMOL/L — LOW (ref 3.5–5.3)
POTASSIUM SERPL-SCNC: 3.3 MMOL/L — LOW (ref 3.5–5.3)
PROT SERPL-MCNC: 7.4 GM/DL — SIGNIFICANT CHANGE UP (ref 6–8.3)
PROT UR-MCNC: NEGATIVE MG/DL — SIGNIFICANT CHANGE UP
RBC # BLD: 3.74 M/UL — LOW (ref 3.8–5.2)
RBC # FLD: 13.9 % — SIGNIFICANT CHANGE UP (ref 10.3–14.5)
RBC CASTS # UR COMP ASSIST: SIGNIFICANT CHANGE UP /HPF (ref 0–4)
SODIUM SERPL-SCNC: 137 MMOL/L — SIGNIFICANT CHANGE UP (ref 135–145)
SP GR SPEC: 1.01 — SIGNIFICANT CHANGE UP (ref 1.01–1.02)
UROBILINOGEN FLD QL: NEGATIVE MG/DL — SIGNIFICANT CHANGE UP
WBC # BLD: 7.1 K/UL — SIGNIFICANT CHANGE UP (ref 3.8–10.5)
WBC # FLD AUTO: 7.1 K/UL — SIGNIFICANT CHANGE UP (ref 3.8–10.5)
WBC UR QL: ABNORMAL

## 2023-08-31 PROCEDURE — 70450 CT HEAD/BRAIN W/O DYE: CPT | Mod: 26,MG

## 2023-08-31 PROCEDURE — G1004: CPT

## 2023-08-31 RX ORDER — SODIUM CHLORIDE 9 MG/ML
1000 INJECTION INTRAMUSCULAR; INTRAVENOUS; SUBCUTANEOUS ONCE
Refills: 0 | Status: COMPLETED | OUTPATIENT
Start: 2023-08-31 | End: 2023-08-31

## 2023-08-31 RX ORDER — ACETAMINOPHEN 500 MG
1000 TABLET ORAL ONCE
Refills: 0 | Status: DISCONTINUED | OUTPATIENT
Start: 2023-08-31 | End: 2023-08-31

## 2023-08-31 RX ORDER — CEFTRIAXONE 500 MG/1
1000 INJECTION, POWDER, FOR SOLUTION INTRAMUSCULAR; INTRAVENOUS ONCE
Refills: 0 | Status: COMPLETED | OUTPATIENT
Start: 2023-08-31 | End: 2023-08-31

## 2023-08-31 RX ORDER — NITROFURANTOIN MACROCRYSTAL 50 MG
1 CAPSULE ORAL
Qty: 10 | Refills: 0
Start: 2023-08-31 | End: 2023-09-04

## 2023-08-31 RX ADMIN — CEFTRIAXONE 100 MILLIGRAM(S): 500 INJECTION, POWDER, FOR SOLUTION INTRAMUSCULAR; INTRAVENOUS at 04:39

## 2023-08-31 RX ADMIN — SODIUM CHLORIDE 1000 MILLILITER(S): 9 INJECTION INTRAMUSCULAR; INTRAVENOUS; SUBCUTANEOUS at 02:16

## 2023-08-31 RX ADMIN — SODIUM CHLORIDE 1000 MILLILITER(S): 9 INJECTION INTRAMUSCULAR; INTRAVENOUS; SUBCUTANEOUS at 01:51

## 2023-08-31 RX ADMIN — SODIUM CHLORIDE 1000 MILLILITER(S): 9 INJECTION INTRAMUSCULAR; INTRAVENOUS; SUBCUTANEOUS at 00:51

## 2023-08-31 RX ADMIN — SODIUM CHLORIDE 1000 MILLILITER(S): 9 INJECTION INTRAMUSCULAR; INTRAVENOUS; SUBCUTANEOUS at 04:39

## 2023-08-31 NOTE — ED PROVIDER NOTE - OBJECTIVE STATEMENT
87 yo F PMH HTN, DM, CVA here for episode of light headedness upon standing when attempting to go to bathroom. No syncope. No chest pain, sob, fevers, chills, uri/uti sx. Pt hemodynamically stable. No focal weakness. Dehydration (orthostatic) vs vertigo vs cva

## 2023-08-31 NOTE — ED PROVIDER NOTE - NSFOLLOWUPINSTRUCTIONS_ED_ALL_ED_FT
You were evaluated in the ER for dizziness on standing in setting of increased urination. You were found to have a urinary tract infection with mild dehydration and your symptoms improved with antibiotics and IV fluids. Stay hydrated. Take antibiotics as prescribed. Call your primary care doctor in the morning for a follow-up appointment. Return to the ER for new or worsening symptoms.

## 2023-08-31 NOTE — ED PROVIDER NOTE - PHYSICAL EXAMINATION
General: well appearing in NAD, AxOx3  HEENT: NC/AT, MMM, PERRL  Cards: RRR no m/r/g  Pulm: CTAB no w/r/r  Abd: soft, nd/nt no rebound or guarding  Ext: no LE edema or ttp or deformity  Neuro: AxOx3, speaking full sentences, ambulatory with cane, moving all extremities

## 2023-08-31 NOTE — ED ADULT NURSE NOTE - SIGNIFICANT NEGATIVE FINDINGS
denies fall/trauma/head strike/LOC  denies CP, SOB, unilateral weakness, numbness/tingling, new vision changes, headache, n/v/d, f/c, dysuria, hematuria

## 2023-08-31 NOTE — ED ADULT NURSE NOTE - OBJECTIVE STATEMENT
Pt A&Ox4 presents to ED c/o dizziness. Pt lives at home alone and ambulated with cane at baseline. As per pt she was at home tonight walking to the bathroom when she felt very lightheaded/dizzy and her legs began to feel weak. Pt reports that she made it back to her bathroom; denies fall/trauma/head strike/LOC. Pt denies CP, SOB, unilateral weakness, numbness/tingling, new vision changes, headache, n/v/d, f/c, dysuria, hematuria. PMH DM, HTN. Of note, pt reports that she  was seen at [facility] for a fall- admitted for one night for observation but discharged- pt reports "there was nothing wrong." Pt connected Pt A&Ox4 presents to ED c/o dizziness. Pt lives at home alone and ambulated with cane at baseline. As per pt she was at home tonight walking to the bathroom when she felt very lightheaded/dizzy and her legs began to feel weak. Pt reports that she made it back to her bathroom; denies fall/trauma/head strike/LOC. Pt denies CP, SOB, unilateral weakness, numbness/tingling, new vision changes, headache, n/v/d, f/c, dysuria, hematuria. PMH DM, HTN, CVA 3 years ago. Of note, pt reports that she  was seen at [facility] for a fall- admitted for one night for observation but discharged- pt reports "there was nothing wrong." Pt connected to cardiac monitor, IV access placed. VSS, NAD noted.

## 2023-08-31 NOTE — ED ADULT NURSE NOTE - ED CARDIAC RHYTHM
[Potential consequences of obesity discussed] : Potential consequences of obesity discussed [Benefits of weight loss discussed] : Benefits of weight loss discussed [Encouraged to increase physical activity] : Encouraged to increase physical activity [Decrease Portions] : decrease portions [Good understanding] : Patient has a good understanding of disease, goals and obesity follow-up plan regular

## 2023-08-31 NOTE — ED PROVIDER NOTE - PATIENT PORTAL LINK FT
You can access the FollowMyHealth Patient Portal offered by [facility] by registering at the following website: [email or web address]. By joining [employer]’s FollowMyHealth portal, you will also be able to view your health information using other applications (apps) compatible with our system.

## 2023-08-31 NOTE — ED ADULT NURSE REASSESSMENT NOTE - NS ED NURSE REASSESS COMMENT FT1
Handoff report received from ADRIANNE LEZAMA at 0715. Pt is AOx4 and stable at this time. Awaiting SW for ambulance pickup because pt states she doesn't have keys to go home.

## 2023-08-31 NOTE — ED ADULT NURSE NOTE - NSFALLRISKINTERV_ED_ALL_ED
Assistance OOB with selected safe patient handling equipment if applicable/Assistance with ambulation/Communicate fall risk and risk factors to all staff, patient, and family/Encourage patient to sit up slowly, dangle for a short time, stand at bedside before walking/Monitor gait and stability/Orthostatic vital signs/Provide patient with walking aids/Provide visual cue: yellow wristband, yellow gown, etc/Reinforce activity limits and safety measures with patient and family/Call bell, personal items and telephone in reach/Instruct patient to call for assistance before getting out of bed/chair/stretcher/Non-slip footwear applied when patient is off stretcher/[place] to call system/Physically safe environment - no spills, clutter or unnecessary equipment/Purposeful Proactive Rounding/Room/bathroom lighting operational, light cord in reach

## 2023-08-31 NOTE — ED PROVIDER NOTE - CLINICAL SUMMARY MEDICAL DECISION MAKING FREE TEXT BOX
Pt with dizziness on standing. resolved with fluids. Pt with increased urination. found to have uti. No systemic sx. Abx administered. stable for dc home with abx and pmd f/u

## 2023-08-31 NOTE — ED ADULT NURSE REASSESSMENT NOTE - NS ED NURSE REASSESS COMMENT FT1
spoke to Dafne who lives next door and has extra keys to the house. States that she will be home to open the door. pt left in stable condition.

## 2023-10-27 RX ORDER — BUDESONIDE AND FORMOTEROL FUMARATE DIHYDRATE 80; 4.5 UG/1; UG/1
80-4.5 AEROSOL RESPIRATORY (INHALATION) TWICE DAILY
Qty: 1 | Refills: 2 | Status: DISCONTINUED | COMMUNITY
Start: 2023-08-21 | End: 2023-10-27

## 2023-11-08 NOTE — DISCHARGE NOTE PROVIDER - NS AS DC PROVIDER CONTACT Y/N MULTI
Continue Zofran for nausea.  Start Pepcid  
BP stable. To continue with antihypertensive meds   
Yes

## 2024-01-15 ENCOUNTER — APPOINTMENT (OUTPATIENT)
Dept: CARDIOLOGY | Facility: CLINIC | Age: 89
End: 2024-01-15
Payer: MEDICARE

## 2024-01-15 ENCOUNTER — NON-APPOINTMENT (OUTPATIENT)
Age: 89
End: 2024-01-15

## 2024-01-15 VITALS
BODY MASS INDEX: 23.03 KG/M2 | OXYGEN SATURATION: 100 % | WEIGHT: 130 LBS | SYSTOLIC BLOOD PRESSURE: 149 MMHG | DIASTOLIC BLOOD PRESSURE: 83 MMHG | HEART RATE: 68 BPM

## 2024-01-15 DIAGNOSIS — E11.9 TYPE 2 DIABETES MELLITUS W/OUT COMPLICATIONS: ICD-10-CM

## 2024-01-15 DIAGNOSIS — I10 ESSENTIAL (PRIMARY) HYPERTENSION: ICD-10-CM

## 2024-01-15 DIAGNOSIS — E78.5 HYPERLIPIDEMIA, UNSPECIFIED: ICD-10-CM

## 2024-01-15 DIAGNOSIS — R01.1 CARDIAC MURMUR, UNSPECIFIED: ICD-10-CM

## 2024-01-15 DIAGNOSIS — D86.9 SARCOIDOSIS, UNSPECIFIED: ICD-10-CM

## 2024-01-15 DIAGNOSIS — R55 SYNCOPE AND COLLAPSE: ICD-10-CM

## 2024-01-15 DIAGNOSIS — R41.3 OTHER AMNESIA: ICD-10-CM

## 2024-01-15 PROCEDURE — G2211 COMPLEX E/M VISIT ADD ON: CPT

## 2024-01-15 PROCEDURE — 99214 OFFICE O/P EST MOD 30 MIN: CPT

## 2024-01-15 PROCEDURE — 93000 ELECTROCARDIOGRAM COMPLETE: CPT

## 2024-01-15 RX ORDER — QUETIAPINE FUMARATE 25 MG/1
25 TABLET ORAL
Qty: 90 | Refills: 1 | Status: ACTIVE | COMMUNITY
Start: 2024-01-15

## 2024-01-15 NOTE — HISTORY OF PRESENT ILLNESS
[FreeTextEntry1] : Ms. King is an 88 year-old woman, born in [place] (with ancestry that is , [place], and ), with past medical history of pulmonary sarcoidosis (1980), Wegener's granulomatosis (followed by Avram Goldberg, MD), non-insulin dependent type II diabetes, CKD (followed by Kane Thompson MD), who presents today for follow-up after a systolic murmur was noted on exam. Patient reports that the murmur has been present for many years. She reports dyspnea with exertion, but sometimes even light housework causes dyspnea.  She gets occasional dizziness and light headedness. She notices it most in the mornings, when she rises from a sitting or laying position. It is better if she moves slowly.  She has no personal history of syncope.   July 2023 - Patient returns today for follow-up in her usual state of health. She had a mechanical fall out of a chair at home approximately two weeks ago. She hurt her right arm, but it is just bruised. She did not hit her head. She did not lose consciousness.  Unrelated to her fall, she reports occasional discomfort at the site of her ILR.

## 2024-01-15 NOTE — REASON FOR VISIT
[Other: ____] : [unfilled] [FreeTextEntry3] : Cris Borges MD [FreeTextEntry1] : January 2024 - Patient returns today for follow-up in her usual state of health. She has several chronic conditions and associated complaints. First, she has musculoskeletal pain in the right arm and shoulder associated with an old mechanical fall. Recently, she has had a lot of difficulty sleeping. She is not sure why this change has occurred.

## 2024-01-15 NOTE — PHYSICAL EXAM
[Well Developed] : well developed [Well Nourished] : well nourished [No Acute Distress] : no acute distress [Normal Conjunctiva] : normal conjunctiva [Normal Venous Pressure] : normal venous pressure [Normal S1, S2] : normal S1, S2 [No Carotid Bruit] : no carotid bruit [No Rub] : no rub [No Gallop] : no gallop [II] : a grade 2 [I] : a grade 1 [Clear Lung Fields] : clear lung fields [Good Air Entry] : good air entry [No Respiratory Distress] : no respiratory distress  [Soft] : abdomen soft [Non Tender] : non-tender [No Masses/organomegaly] : no masses/organomegaly [Normal Bowel Sounds] : normal bowel sounds [Normal Gait] : normal gait [No Edema] : no edema [No Cyanosis] : no cyanosis [No Clubbing] : no clubbing [No Varicosities] : no varicosities [No Rash] : no rash [No Skin Lesions] : no skin lesions [No Focal Deficits] : no focal deficits [Moves all extremities] : moves all extremities [Normal Speech] : normal speech [Alert and Oriented] : alert and oriented [Normal memory] : normal memory

## 2024-01-15 NOTE — CARDIOLOGY SUMMARY
[de-identified] : 7/17/2023, sinus rhythm, old inferior MI, poor R-wave progression, low voltage in precordial leads [de-identified] : 4/16/2021, normal LA, no PFO seen, no evidence of pulmonary hypertension, normal LV systolic function, VLEF 65%\par  4/24/2023, normal LA, calcified aortic valve, peak gradient 29 mmHg, mean gradient 11 mmHg, PIPER 1.7 sqcm by continuity, normal pulmonary pressures, hyperdynamic LV systolic function, LVEF 70-75% [de-identified] : 6/7/2019, [employer] LINQ implantable loop recorder placed by Prince Dawkins MD at [facility]

## 2024-01-15 NOTE — DISCUSSION/SUMMARY
[FreeTextEntry1] : 88 year-old woman presents for evaluation after a systolic murmur was noted on exam.  She denies any history of syncope and collapse. She has no anginal chest pain, but reports dyspnea with minimal exertion.  TTE from April 2023 reviewed showing mild aortic stenosis. Murmur is likely a combination of mild AS and hyperdynamic LV. Continue beta-blocker for hyperdynamic LV function.  Continue ARB-thiazide diuretic combination for hypertension. Seroquel prescribed by her neurologist, Bala Snow MD after she had some nighttime hallucinations. Her QTc remains normal.  Labs per PMD, rheumatology, and nephrology.  She has tenderness at her loop recorder site. Will ask EP to explant her ILR. No indication to have it replaced at this time. [EKG obtained to assist in diagnosis and management of assessed problem(s)] : EKG obtained to assist in diagnosis and management of assessed problem(s)

## 2024-01-23 ENCOUNTER — NON-APPOINTMENT (OUTPATIENT)
Age: 89
End: 2024-01-23

## 2024-02-27 NOTE — ED PROVIDER NOTE - VASCULAR COMPROMISE
Problem: Discharge Planning  Goal: Discharge to home or other facility with appropriate resources  Outcome: Adequate for Discharge      no vascular compromise/pulses full and equal bilaterally

## 2024-04-08 NOTE — DISCHARGE NOTE PROVIDER - CONDITIONS AT DISCHARGE
4/10/2024      Re: Sarah Montero  : 1949      Dr. Maurice Montero has been seen for preop medical clearance and has been cleared for surgery. If you have any questions, please feel free to contact me. Thank you for your kind referral.          Aaron Medrano MD  [address] [place] [phone]        
Stable for DC as per Dr. Marino

## 2024-04-10 ENCOUNTER — APPOINTMENT (OUTPATIENT)
Dept: GASTROENTEROLOGY | Facility: CLINIC | Age: 89
End: 2024-04-10

## 2024-04-22 ENCOUNTER — NON-APPOINTMENT (OUTPATIENT)
Age: 89
End: 2024-04-22

## 2024-04-23 RX ORDER — FLUTICASONE PROPIONATE AND SALMETEROL 250; 50 UG/1; UG/1
250-50 POWDER RESPIRATORY (INHALATION)
Qty: 180 | Refills: 0 | Status: ACTIVE | COMMUNITY
Start: 2024-04-23 | End: 1900-01-01

## 2024-07-15 ENCOUNTER — NON-APPOINTMENT (OUTPATIENT)
Age: 89
End: 2024-07-15

## 2024-07-15 ENCOUNTER — APPOINTMENT (OUTPATIENT)
Dept: CARDIOLOGY | Facility: CLINIC | Age: 89
End: 2024-07-15
Payer: MEDICARE

## 2024-07-15 VITALS
BODY MASS INDEX: 24.09 KG/M2 | DIASTOLIC BLOOD PRESSURE: 78 MMHG | HEART RATE: 54 BPM | OXYGEN SATURATION: 97 % | SYSTOLIC BLOOD PRESSURE: 135 MMHG | WEIGHT: 136 LBS

## 2024-07-15 DIAGNOSIS — I10 ESSENTIAL (PRIMARY) HYPERTENSION: ICD-10-CM

## 2024-07-15 DIAGNOSIS — E11.9 TYPE 2 DIABETES MELLITUS W/OUT COMPLICATIONS: ICD-10-CM

## 2024-07-15 DIAGNOSIS — D86.9 SARCOIDOSIS, UNSPECIFIED: ICD-10-CM

## 2024-07-15 DIAGNOSIS — R01.1 CARDIAC MURMUR, UNSPECIFIED: ICD-10-CM

## 2024-07-15 DIAGNOSIS — R07.89 OTHER CHEST PAIN: ICD-10-CM

## 2024-07-15 DIAGNOSIS — E78.5 HYPERLIPIDEMIA, UNSPECIFIED: ICD-10-CM

## 2024-07-15 PROCEDURE — 99214 OFFICE O/P EST MOD 30 MIN: CPT

## 2024-07-15 PROCEDURE — G2211 COMPLEX E/M VISIT ADD ON: CPT

## 2024-07-15 PROCEDURE — 93000 ELECTROCARDIOGRAM COMPLETE: CPT

## 2024-07-26 ENCOUNTER — RX RENEWAL (OUTPATIENT)
Age: 89
End: 2024-07-26

## 2024-08-15 ENCOUNTER — TRANSCRIPTION ENCOUNTER (OUTPATIENT)
Age: 89
End: 2024-08-15

## 2024-08-19 ENCOUNTER — INPATIENT (INPATIENT)
Facility: HOSPITAL | Age: 89
LOS: 2 days | Discharge: HOME | End: 2024-08-22
Attending: INTERNAL MEDICINE | Admitting: INTERNAL MEDICINE
Payer: MEDICARE

## 2024-08-19 ENCOUNTER — APPOINTMENT (OUTPATIENT)
Dept: PULMONOLOGY | Facility: CLINIC | Age: 89
End: 2024-08-19

## 2024-08-19 ENCOUNTER — NON-APPOINTMENT (OUTPATIENT)
Age: 89
End: 2024-08-19

## 2024-08-19 VITALS
RESPIRATION RATE: 16 BRPM | HEIGHT: 61 IN | HEART RATE: 87 BPM | SYSTOLIC BLOOD PRESSURE: 174 MMHG | WEIGHT: 134.04 LBS | DIASTOLIC BLOOD PRESSURE: 95 MMHG | OXYGEN SATURATION: 97 % | TEMPERATURE: 100 F

## 2024-08-19 DIAGNOSIS — Z98.89 OTHER SPECIFIED POSTPROCEDURAL STATES: Chronic | ICD-10-CM

## 2024-08-19 DIAGNOSIS — Z90.49 ACQUIRED ABSENCE OF OTHER SPECIFIED PARTS OF DIGESTIVE TRACT: Chronic | ICD-10-CM

## 2024-08-19 LAB
BASOPHILS # BLD AUTO: 0.01 K/UL — SIGNIFICANT CHANGE UP (ref 0–0.2)
BASOPHILS NFR BLD AUTO: 0.1 % — SIGNIFICANT CHANGE UP (ref 0–2)
EOSINOPHIL # BLD AUTO: 0.09 K/UL — SIGNIFICANT CHANGE UP (ref 0–0.5)
EOSINOPHIL NFR BLD AUTO: 0.9 % — SIGNIFICANT CHANGE UP (ref 0–6)
HCT VFR BLD CALC: 31.1 % — LOW (ref 34.5–45)
HGB BLD-MCNC: 9.9 G/DL — LOW (ref 11.5–15.5)
IANC: 8.07 K/UL — HIGH (ref 1.8–7.4)
IMM GRANULOCYTES NFR BLD AUTO: 1 % — HIGH (ref 0–0.9)
LYMPHOCYTES # BLD AUTO: 0.59 K/UL — LOW (ref 1–3.3)
LYMPHOCYTES # BLD AUTO: 6.2 % — LOW (ref 13–44)
MCHC RBC-ENTMCNC: 26.8 PG — LOW (ref 27–34)
MCHC RBC-ENTMCNC: 31.8 GM/DL — LOW (ref 32–36)
MCV RBC AUTO: 84.1 FL — SIGNIFICANT CHANGE UP (ref 80–100)
MONOCYTES # BLD AUTO: 0.69 K/UL — SIGNIFICANT CHANGE UP (ref 0–0.9)
MONOCYTES NFR BLD AUTO: 7.2 % — SIGNIFICANT CHANGE UP (ref 2–14)
NEUTROPHILS # BLD AUTO: 8.07 K/UL — HIGH (ref 1.8–7.4)
NEUTROPHILS NFR BLD AUTO: 84.6 % — HIGH (ref 43–77)
NRBC # BLD: 0 /100 WBCS — SIGNIFICANT CHANGE UP (ref 0–0)
NRBC # FLD: 0 K/UL — SIGNIFICANT CHANGE UP (ref 0–0)
PLATELET # BLD AUTO: 234 K/UL — SIGNIFICANT CHANGE UP (ref 150–400)
RBC # BLD: 3.7 M/UL — LOW (ref 3.8–5.2)
RBC # FLD: 15 % — HIGH (ref 10.3–14.5)
WBC # BLD: 9.55 K/UL — SIGNIFICANT CHANGE UP (ref 3.8–10.5)
WBC # FLD AUTO: 9.55 K/UL — SIGNIFICANT CHANGE UP (ref 3.8–10.5)

## 2024-08-19 PROCEDURE — 71046 X-RAY EXAM CHEST 2 VIEWS: CPT | Mod: 26

## 2024-08-19 PROCEDURE — 99285 EMERGENCY DEPT VISIT HI MDM: CPT

## 2024-08-19 NOTE — ED PROVIDER NOTE - OBJECTIVE STATEMENT
88 y/o F with PMH of DM2, HTN, HLD, CVA/TIA, CKD, and pulmonary sarcoidosis, presents c/o productive cough x 3 days. Patient was recently admitted to the hospital last week for respiratory failure with hypoxia and uti. She was discharged this past Friday with improvement in her breathing and did not need oxygen upon discharge. On Saturday she developed a productive cough that has been persistent since that time so family decided to do an at home covid test today that came back positive. They called the patients pcp who noted that the patient is not a paxlovid candidate so she was advised to come to the ED instead for further evaluation and care. Denies any other complaints or concerns. Denies chest pain, SOB, fevers, chills, abdominal pain, N/V/D/C, urinary complaints, HA, dizziness, numbness, tingling, weakness, trauma, injuries, falls, sick contacts, recent travel.

## 2024-08-19 NOTE — ED ADULT TRIAGE NOTE - CHIEF COMPLAINT QUOTE
Pt tested positive for covid today at home.  pt was hospitalized last week for acute respiratory failure with hypoxia, CKD, DM, UTI and discharged on friday.  Pt states she has a cough that started 2 days ago.  BS 84  Hx: CKD, DM, HTN, high cholesterol, sarcoidosis.

## 2024-08-19 NOTE — ED ADULT TRIAGE NOTE - TEMP AT ED ARRIVAL (C)
PHYSICAL EXAMINATION FORM   Name: Derek COMER Grant      EXAMINATION     Vitals: /68   Pulse 79   Temp 97.9 °F (36.6 °C) (Temporal)   Ht 5' 3.58\" (1.615 m)   Wt 55.2 kg (121 lb 12.8 oz)   BMI 21.18 kg/m²   BSA 1.58 m²  Gender: male  Vision: R 20/               L 20/                      Corrected   Y         N     MEDICAL NORMAL ABNORMAL FINDINGS   Appearance  · Marfan stigmata (kyphoscoliosis, high-arched palate, pectus excavatum, arachnodactyly, arm span > height, hyperlaxity, myopia, MVP, aortic insufficiency) Yes    Eyes/ears/nose/throat  · Pupils equal  · Hearing Yes    Lymph nodes Yes    Heart*  · Murmurs (auscultation standing, supine, +/- Valsalva)  · Location of point of maximal impulse (PMI) Yes    Pulses  · Simultaneous femoral and radial pulses Yes    Lungs Yes    Abdomen Yes    Genitourinary (males only)** Yes    Skin  · HSV, lesions suggestive of MRSA, tinea corporis Yes    Neurologic# Yes    MUSCULOSKELETAL     Neck Yes    Back Yes    Shoulder/arm Yes    Elbow/forearm Yes    Wrist/hand/fingers Yes    Hip/thigh Yes    Knee Yes    Leg/Ankle Yes    Foot/toes Yes    Functional  · Duck-walk, single leg hop Yes    * Consider ECG, echocardiogram, and referral to cardiology for abnormal cardiac history or exam  ** Consider  exam in private setting.  Having third party present is recommended.  # Consider cognitive evaluation or baseline neuropsychiatric testing if a history of significant concussion.    On the basic on the examination on this day, I approve this child's participation in interscholastic sports for 395 days from this date.  Yes  Examination Date: 2/18/2019    Additional Comments:                    Signature*: _______________________________________           Print Name:  Brian Bautista MD                         * effective January 2003, the [facility] Board of Directors approved a recommendation, consistent with the [place] [employer] Code, that allow Physician's Assistants or Advanced Nurse  Probationers to sign off on physicals.               To be completed by athlete or parent prior to examination  Name: Derek COMER Grant  School Year: ____________________   Address: ___________________________________________ City/State: ________________________________________________   Phone No: __________________________________________ Birthday: 2002    Age: 17 year old   Class: ________ Student ID No: _______   Parent's Name: ______________________________________ Phone No: ________________________________________________   Address: ___________________________________________ City/State: ________________________________________________   HISTORY FORM    Medicines and Allergies: Please list all of the prescription and over- the-counter medicines (herbal and nutritional) that your are currently taking   Do you have allergies?  __ Yes    __ No If yes, please identify specific allergy below.   __ Medicines                                              __Pollens                                              __Food                                              __Stinging Insects   Explain \"Yes\" answers below.  [place] questions you don't know the answer to.  GENERAL QUESTIONS Yes No    1  Has a doctor ever denied or restricted your participation in sports       for any reason?      2  Do you have any ongoing medical conditions? If so, please        identify below: __Asthma  __Anemia  __Diabetes  __Infections      Other: _________________________________________      3  Have you ever spent the night in the hospital?      4  Have you ever had surgery?      HEART HEALTH QUESTIONS ABOUT YOU Yes No    5  Have you ever passed out or nearly passes out DURING or        AFTER exercise?      6  Have you ever had discomfort, pain, tightness or pressure in       your chest during exercise?       7  Does your heart ever race or skip beats (irregular beats)       during exercise?      8  Has a doctor ever told you that you have any  heart problems?       If so check all that apply: __High blood pressure       __Heart murmur  __High cholesterol  __ Heart infection      __Kawasaki disease  __Other_________________________      9   Has a doctor ever ordered a test for your heart? (for example        ECG/EKG, echocardiogram)      10  Do you every get lightheaded or feel more short of breath         than expected during exercise?       11  Have you ever had an unexplained seizure?      12  Do you get more tired or short of breath more quickly than         your friends during exercise?      HEART HEALTH QUESTIONS ABOUT YOUR FAMILY Yes No    13 Has any family member or relative  of heart problems or         had an unexpected or unexplained sudden death before age         50 (including drowning, unexplained car accident, or sudden        infant death syndrome)?      14 Does anyone in your family have hypertrophic        cardiomyopathy, Marfan syndrome, arrhythmogenic right        ventricular cardiomyopathy, long QT syndrome, short QT        syndrome, Brugada syndrome, or catecholaminergic        polymorphic ventricular tachycardia)?      15 Does anyone in your family have a heart problem,        pacemaker, or implanted defibrillator?      16 Has anyone in your family had unexplained fainting,        unexplained seizure, or near drowning?      BONE AND JOINT QUESTIONS Yes No    17 Have you ever had an injury to a bone, muscle, ligament or        tendon that caused you to miss a practice or a game?      18 Have you ever had any broken or fracture bones or         dislocated joints?      19 Have you ever had any injury that required x-rays, MRI, CT        scan, injections, therapy, brace, cast, or crutches?      20 Have you ever had a stress fracture?      21 Have you ever been told that you have or have had an x-ray        for neck instability or atlantoaxial instability? (Down         syndrome or dwarfism)      22 Do you regularly use a brace,  orthotics, or other assistive         device?      23 Do you have a bone, muscle, or joint injury that bothers you?      24 Do any of your joints become painful, swollen, feel warm, or       look red?      25 Do you have any history of juvenile arthritis or connective       tissue disease?       MEDICAL QUESTIONS Yes No   26 Do you cough, wheeze, or have difficulty breathing during       or after exercise?     27 Have you ever used an inhaler or taken asthma medicine?     28. Is there anyone in your family who has asthma?     29 Were you born without or are you missing a kidney, an eye,         a testicle (males), your spleen, or any other organ?     30 Do you have groin pain or painful bulge or hernia in the groin area?     31 Have you had infectious mononucleosis (mono) within the last        month?     32 Do you have any rashes, pressure sores, or other skin problems?     33 Have you had a herpes or MRSA skin infection?     34 Have you ever had a head injury or concussion?     35 Have you ever had a hit or blow to the head that caused         confusion, prolonged headache, or memory problems?     36 Do you have a history of seizure disorder?     37 Do you have headaches with exercise?     38 Have you ever had numbness, tingling, or weakness in your arms        or legs after being hit or falling?     39 Have you ever been unable to move your or legs after being hit or        falling?     40 Have you ever become ill while exercising in the heat?     41 Do you get frequent muscle cramps when exercising?     42 Do you or someone in your family have sickle cell trait or disease?     43 Have you had any problems with your eyes or vision?     44 Have you had any eye injuries?     45 Do you wear glasses or contact lenses?     46 Do you wear protective eyewear, such as goggles or a face shield?     47 Do you worry about your weight?     48 Are you trying to or has anyone recommended that you gain or         lose weight?      49 Are you on a special diet or do you avoid certain types of foods?     50 Have you ever had an eating disorder?     51 Have you or any family member or relative been diagnosed with        cancer?     52 Do you have any concerns that you would like to discuss with a        doctor?     FEMALES ONLY Yes No   53 Have you ever had a menstrual period?     54 How old were you when you had your first menstrual period?     55 How many periods have you had in the last 12 months?       Explain 'yes' answers here:  ________________________________________________________________    ________________________________________________________________    ________________________________________________________________    ________________________________________________________________    ________________________________________________________________    ________________________________________________________________    ________________________________________________________________    ________________________________________________________________     I hereby state that, to the best of my knowledge, my answers to the above questions are complete and correct.   Signature of athlete: _____________________________________________ Signature of parent/guardian: ________________________________________        37.6

## 2024-08-19 NOTE — ED ADULT NURSE NOTE - OBJECTIVE STATEMENT
89 y/o F presents to ED room 25 A&Ox4 c/o COVID + test and cough.  pt was hospitalized last week for acute respiratory failure with hypoxia, CKD, DM, UTI and discharged on friday. wet cough noted. pt denies chest pain, weakness, n/v/d at this time. respirations even and unlabored. 100% on RA. placed on continuous monitor, NSR noted. Hx sarcoidosis of lung, CVA, anemia, renal failure. 20G to LAC, labs drawn and sent. awaiting cxr. safety maintained, call bell within reach. airborne precautions in place

## 2024-08-19 NOTE — ED PROVIDER NOTE - PHYSICAL EXAMINATION
CONSTITUTIONAL: Comfortable; in no acute distress. Non-toxic appearing.   NEURO: Alert & oriented. Sensory and motor functions are grossly intact.  PSYCH: Mood appropriate. Thought processes intact.   HEAD: NCAT  CARD: Regular rate and rhythm, no murmurs  RESP: No accessory muscle use; (+) diffuse course breath sounds bilaterally. No wheezes or crackles.     MUSCULOSKELETAL/EXTREMITIES: FROM in all four extremities; no extremity edema.  SKIN: Warm; dry; no apparent lesions or exudate

## 2024-08-19 NOTE — ED ADULT TRIAGE NOTE - AS HEIGHT TYPE
Refill Request     CONFIRM preferred pharmacy with the patient.    If Mail Order Rx - Pend for 90 day refill.      Last Seen: Last Seen Department: 3/16/2023  Last Seen by PCP: 1/25/2023    Last Written: 1/31/2024 #90 no refills    If no future appointment scheduled:  Review the last OV with PCP and review information for follow-up visit,  Route STAFF MESSAGE with patient name to the  Pool for scheduling with the following information:            -  Timing of next visit           -  Visit type ie Physical, OV, etc           -  Diagnoses/Reason ie. COPD, HTN - Do not use MEDICATION, Follow-up or CHECK UP - Give reason for visit      Next Appointment:   Future Appointments   Date Time Provider Department Center   6/17/2024  3:00 PM Blake Krueger, DO [facility]       Message sent to  to schedule appt with patient?  NO      Requested Prescriptions     Pending Prescriptions Disp Refills    ibuprofen (ADVIL;MOTRIN) 800 MG tablet [Pharmacy Med Name: IBUPROFEN 800 MG TABLET] 90 tablet 0     Sig: TAKE 1 TABLET BY MOUTH THREE TIMES A DAY AS NEEDED FOR PAIN       
stated

## 2024-08-19 NOTE — ED PROVIDER NOTE - CLINICAL SUMMARY MEDICAL DECISION MAKING FREE TEXT BOX
88 y/o F with PMH of DM2, HTN, HLD, CVA/TIA, CKD, and pulmonary sarcoidosis, presents c/o productive cough x 3 days. Will order blood work, chest xray, and rvp for further eval. Will reassess. 89 y/o F with PMH of DM2, HTN, HLD, CVA/TIA, CKD, and pulmonary sarcoidosis, presents c/o productive cough x 3 days. Will order blood work, chest xray, and rvp for further eval. Will reassess.  Lab results wnl.  RVP positive for COVID. Started on remdesivir.  Chest xray shows "Left mid to lower lung field patchy opacities may represent pneumonia."  Patient was covered with abx for hospital acquired pna considering her recently hospitalization.   Patient will be admitted under Dr. Zheng for further care and management.

## 2024-08-19 NOTE — ED PROVIDER NOTE - CARE PLAN
Principal Discharge DX:	2019 novel coronavirus disease (COVID-19)  Secondary Diagnosis:	Hospital acquired PNA   1

## 2024-08-19 NOTE — ED PROVIDER NOTE - ATTENDING APP SHARED VISIT CONTRIBUTION OF CARE
89-year-old female past medical history diabetes, hypertension, bulimia, CVA, CKD, sarcoidosis, recent admission for hypoxia and UTI presents for cough and home positive COVID test.  Patient reports started having cough and of last week took home test which was positive for COVID.  Patient spoke with PCP today who recommended coming to ED for further evaluation, did not recommend Paxlovid.  Patient denies headache, fever, chills, shortness of breath, chest pain, abdominal pain, nausea, vomiting, diarrhea, urinary symptoms.  Patient denies any extremity swelling or pain.  Patient reports tolerating p.o. well.    Exam as above, scattered coarse breath sounds, no wheezing or rails, good air movement, no retractions.  Abdomen soft nontender.  Moist mucous membranes.  Patient with outpatient positive COVID test in setting of cough.  Patient SpO2 99% on room air.  Plan: Labs, x-ray, reassess.  Patient signed out end of my shift.

## 2024-08-20 DIAGNOSIS — U07.1 COVID-19: ICD-10-CM

## 2024-08-20 LAB
ALBUMIN SERPL ELPH-MCNC: 3.2 G/DL — LOW (ref 3.3–5)
ALBUMIN SERPL ELPH-MCNC: 3.6 G/DL — SIGNIFICANT CHANGE UP (ref 3.3–5)
ALP SERPL-CCNC: 56 U/L — SIGNIFICANT CHANGE UP (ref 40–120)
ALP SERPL-CCNC: 66 U/L — SIGNIFICANT CHANGE UP (ref 40–120)
ALT FLD-CCNC: 21 U/L — SIGNIFICANT CHANGE UP (ref 4–33)
ALT FLD-CCNC: 25 U/L — SIGNIFICANT CHANGE UP (ref 4–33)
ANION GAP SERPL CALC-SCNC: 16 MMOL/L — HIGH (ref 7–14)
APTT BLD: 25.8 SEC — SIGNIFICANT CHANGE UP (ref 24.5–35.6)
AST SERPL-CCNC: 23 U/L — SIGNIFICANT CHANGE UP (ref 4–32)
AST SERPL-CCNC: 25 U/L — SIGNIFICANT CHANGE UP (ref 4–32)
BILIRUB DIRECT SERPL-MCNC: <0.2 MG/DL — SIGNIFICANT CHANGE UP (ref 0–0.3)
BILIRUB INDIRECT FLD-MCNC: >0.1 MG/DL — SIGNIFICANT CHANGE UP (ref 0–1)
BILIRUB SERPL-MCNC: 0.3 MG/DL — SIGNIFICANT CHANGE UP (ref 0.2–1.2)
BILIRUB SERPL-MCNC: 0.3 MG/DL — SIGNIFICANT CHANGE UP (ref 0.2–1.2)
BLOOD GAS VENOUS COMPREHENSIVE RESULT: SIGNIFICANT CHANGE UP
BUN SERPL-MCNC: 13 MG/DL — SIGNIFICANT CHANGE UP (ref 7–23)
CALCIUM SERPL-MCNC: 9.9 MG/DL — SIGNIFICANT CHANGE UP (ref 8.4–10.5)
CHLORIDE SERPL-SCNC: 101 MMOL/L — SIGNIFICANT CHANGE UP (ref 98–107)
CO2 SERPL-SCNC: 22 MMOL/L — SIGNIFICANT CHANGE UP (ref 22–31)
CREAT SERPL-MCNC: 1.25 MG/DL — SIGNIFICANT CHANGE UP (ref 0.5–1.3)
CREAT SERPL-MCNC: 1.26 MG/DL — SIGNIFICANT CHANGE UP (ref 0.5–1.3)
D DIMER BLD IA.RAPID-MCNC: 586 NG/ML DDU — HIGH
EGFR: 41 ML/MIN/1.73M2 — LOW
EGFR: 42 ML/MIN/1.73M2 — LOW
FLUAV AG NPH QL: SIGNIFICANT CHANGE UP
FLUBV AG NPH QL: SIGNIFICANT CHANGE UP
GLUCOSE BLDC GLUCOMTR-MCNC: 93 MG/DL — SIGNIFICANT CHANGE UP (ref 70–99)
GLUCOSE SERPL-MCNC: 89 MG/DL — SIGNIFICANT CHANGE UP (ref 70–99)
INR BLD: 0.92 RATIO — SIGNIFICANT CHANGE UP (ref 0.85–1.18)
INR BLD: 0.95 RATIO — SIGNIFICANT CHANGE UP (ref 0.85–1.18)
POTASSIUM SERPL-MCNC: 3.2 MMOL/L — LOW (ref 3.5–5.3)
POTASSIUM SERPL-SCNC: 3.2 MMOL/L — LOW (ref 3.5–5.3)
PROT SERPL-MCNC: 6.1 G/DL — SIGNIFICANT CHANGE UP (ref 6–8.3)
PROT SERPL-MCNC: 7 G/DL — SIGNIFICANT CHANGE UP (ref 6–8.3)
PROTHROM AB SERPL-ACNC: 10.3 SEC — SIGNIFICANT CHANGE UP (ref 9.5–13)
PROTHROM AB SERPL-ACNC: 10.8 SEC — SIGNIFICANT CHANGE UP (ref 9.5–13)
RSV RNA NPH QL NAA+NON-PROBE: SIGNIFICANT CHANGE UP
SARS-COV-2 RNA SPEC QL NAA+PROBE: DETECTED
SODIUM SERPL-SCNC: 139 MMOL/L — SIGNIFICANT CHANGE UP (ref 135–145)

## 2024-08-20 RX ORDER — REMDESIVIR 5 MG/ML
INJECTION INTRAVENOUS
Refills: 0 | Status: COMPLETED | OUTPATIENT
Start: 2024-08-20 | End: 2024-08-22

## 2024-08-20 RX ORDER — PIPERACILLIN SODIUM AND TAZOBACTAM SODIUM 3; .375 G/15ML; G/15ML
3.38 INJECTION, POWDER, FOR SOLUTION INTRAVENOUS ONCE
Refills: 0 | Status: COMPLETED | OUTPATIENT
Start: 2024-08-20 | End: 2024-08-20

## 2024-08-20 RX ORDER — AMLODIPINE BESYLATE 10 MG/1
5 TABLET ORAL DAILY
Refills: 0 | Status: DISCONTINUED | OUTPATIENT
Start: 2024-08-20 | End: 2024-08-22

## 2024-08-20 RX ORDER — REMDESIVIR 5 MG/ML
200 INJECTION INTRAVENOUS EVERY 24 HOURS
Refills: 0 | Status: COMPLETED | OUTPATIENT
Start: 2024-08-20 | End: 2024-08-20

## 2024-08-20 RX ORDER — DEXTROSE 15 G/33 G
15 GEL IN PACKET (GRAM) ORAL ONCE
Refills: 0 | Status: DISCONTINUED | OUTPATIENT
Start: 2024-08-20 | End: 2024-08-22

## 2024-08-20 RX ORDER — PREDNISONE 10 MG
5 TABLET, DOSE PACK ORAL DAILY
Refills: 0 | Status: DISCONTINUED | OUTPATIENT
Start: 2024-08-20 | End: 2024-08-22

## 2024-08-20 RX ORDER — POTASSIUM CHLORIDE 10 MEQ
40 TABLET, EXT RELEASE, PARTICLES/CRYSTALS ORAL ONCE
Refills: 0 | Status: COMPLETED | OUTPATIENT
Start: 2024-08-20 | End: 2024-08-20

## 2024-08-20 RX ORDER — PANTOPRAZOLE SODIUM 40 MG
40 TABLET, DELAYED RELEASE (ENTERIC COATED) ORAL
Refills: 0 | Status: DISCONTINUED | OUTPATIENT
Start: 2024-08-20 | End: 2024-08-22

## 2024-08-20 RX ORDER — SODIUM CHLORIDE 9 MG/ML
1900 INJECTION INTRAMUSCULAR; INTRAVENOUS; SUBCUTANEOUS ONCE
Refills: 0 | Status: COMPLETED | OUTPATIENT
Start: 2024-08-20 | End: 2024-08-20

## 2024-08-20 RX ORDER — REMDESIVIR 5 MG/ML
INJECTION INTRAVENOUS
Refills: 0 | Status: DISCONTINUED | OUTPATIENT
Start: 2024-08-20 | End: 2024-08-20

## 2024-08-20 RX ORDER — ACETAMINOPHEN 325 MG/1
650 TABLET ORAL EVERY 6 HOURS
Refills: 0 | Status: DISCONTINUED | OUTPATIENT
Start: 2024-08-20 | End: 2024-08-22

## 2024-08-20 RX ORDER — REMDESIVIR 5 MG/ML
100 INJECTION INTRAVENOUS EVERY 24 HOURS
Refills: 0 | Status: COMPLETED | OUTPATIENT
Start: 2024-08-21 | End: 2024-08-22

## 2024-08-20 RX ORDER — DEXTROSE 15 G/33 G
12.5 GEL IN PACKET (GRAM) ORAL ONCE
Refills: 0 | Status: DISCONTINUED | OUTPATIENT
Start: 2024-08-20 | End: 2024-08-22

## 2024-08-20 RX ORDER — VANCOMYCIN/0.9 % SOD CHLORIDE 1.75G/25
1000 PLASTIC BAG, INJECTION (ML) INTRAVENOUS ONCE
Refills: 0 | Status: COMPLETED | OUTPATIENT
Start: 2024-08-20 | End: 2024-08-20

## 2024-08-20 RX ORDER — ACETAMINOPHEN 325 MG/1
1000 TABLET ORAL ONCE
Refills: 0 | Status: COMPLETED | OUTPATIENT
Start: 2024-08-20 | End: 2024-08-20

## 2024-08-20 RX ORDER — POTASSIUM CHLORIDE 10 MEQ
40 TABLET, EXT RELEASE, PARTICLES/CRYSTALS ORAL ONCE
Refills: 0 | Status: DISCONTINUED | OUTPATIENT
Start: 2024-08-20 | End: 2024-08-20

## 2024-08-20 RX ORDER — DEXTROSE 15 G/33 G
25 GEL IN PACKET (GRAM) ORAL ONCE
Refills: 0 | Status: DISCONTINUED | OUTPATIENT
Start: 2024-08-20 | End: 2024-08-22

## 2024-08-20 RX ORDER — GLUCAGON INJECTION, SOLUTION 1 MG/.2ML
1 INJECTION, SOLUTION SUBCUTANEOUS ONCE
Refills: 0 | Status: DISCONTINUED | OUTPATIENT
Start: 2024-08-20 | End: 2024-08-22

## 2024-08-20 RX ADMIN — PIPERACILLIN SODIUM AND TAZOBACTAM SODIUM 200 GRAM(S): 3; .375 INJECTION, POWDER, FOR SOLUTION INTRAVENOUS at 00:42

## 2024-08-20 RX ADMIN — ACETAMINOPHEN 650 MILLIGRAM(S): 325 TABLET ORAL at 11:10

## 2024-08-20 RX ADMIN — Medication 25 MILLIGRAM(S): at 21:50

## 2024-08-20 RX ADMIN — ACETAMINOPHEN 1000 MILLIGRAM(S): 325 TABLET ORAL at 08:36

## 2024-08-20 RX ADMIN — REMDESIVIR 200 MILLIGRAM(S): 5 INJECTION INTRAVENOUS at 03:37

## 2024-08-20 RX ADMIN — SODIUM CHLORIDE 1900 MILLILITER(S): 9 INJECTION INTRAMUSCULAR; INTRAVENOUS; SUBCUTANEOUS at 00:41

## 2024-08-20 RX ADMIN — ACETAMINOPHEN 400 MILLIGRAM(S): 325 TABLET ORAL at 00:41

## 2024-08-20 RX ADMIN — Medication 5 MILLIGRAM(S): at 11:10

## 2024-08-20 RX ADMIN — Medication 250 MILLIGRAM(S): at 01:29

## 2024-08-20 RX ADMIN — AMLODIPINE BESYLATE 5 MILLIGRAM(S): 10 TABLET ORAL at 11:10

## 2024-08-20 RX ADMIN — ACETAMINOPHEN 650 MILLIGRAM(S): 325 TABLET ORAL at 11:25

## 2024-08-20 RX ADMIN — Medication 40 MILLIEQUIVALENT(S): at 00:51

## 2024-08-20 NOTE — CONSULT NOTE ADULT - SUBJECTIVE AND OBJECTIVE BOX
08-20-24 @ 10:25    Patient is a 89y old  Female who presents with a chief complaint of     HPI:  88 y/o F with PMH of DM2, HTN, HLD, CVA/TIA, CKD, and pulmonary sarcoidosis, presents c/o productive cough x 3 days. Patient was recently admitted to the hospital last week for respiratory failure with hypoxia and uti. She was discharged this past Friday with improvement in her breathing and did not need oxygen upon discharge. On Saturday she developed a productive cough that has been persistent since that time so family decided to do an at home covid test today that came back positive. They called the patients pcp who noted that the patient is not a paxlovid candidate so she was advised to come to the ED instead for further evaluation and care. Denies any other complaints or concerns. Denies chest pain, SOB, fevers, chills, abdominal pain, N/V/D/C, urinary complaints, HA, dizziness, numbness, tingling, weakness, trauma, injuries, falls, sick contacts, recent travel.    now pulm called to evaluate   pt says she has no outside pulmonologist:   she came in here with above symptoms and found to have covid infection: and hence pulm called:   she takes prednisonex at home:  now here with covid infection  she is on room air at 100%:     ?FOLLOWING PRESENT  [ ] Hx of PE/DVT, [ x] Hx COPD, [x ] Hx of Asthma, [y ] Hx of Hospitalization, [ x]  Hx of BiPAP/CPAP use, [x ] Hx of MELINDA    Allergies    IV Contrast (Hives; Rash)  sulfa drugs (Hives)  codeine (Pruritus)  shellfish (Hives)  iodine (Hives)  morphine (Pruritus)    Intolerances        PAST MEDICAL & SURGICAL HISTORY:  HTN (hypertension)      ARF (acute renal failure)  with glomerulonephritis      Anemia      Ectopic fetus      Sarcoidosis of lung      HLD (hyperlipidemia)      Hernia  as a child      Mass  calcified mass left hip area, 2011      Murmur      CVA (cerebral vascular accident)      TIA (transient ischemic attack)      S/P appendectomy      S/P hysterectomy  with BLSO      S/P knee surgery  for right torn meniscual      S/P cholecystectomy  11/2015      H/O cataract extraction, right  with laser revision 3/2016          FAMILY HISTORY:  Type 2 diabetes mellitus (Sibling)        Social History: [  x] TOBACCO                  [ x ] ETOH                                 [ x ] IVDA/DRUGS    REVIEW OF SYSTEMS      General:	weakness    Skin/Breast:x  	  Ophthalmologic:x  	  ENMT:	x    Respiratory and Thorax:  cough   	  Cardiovascular:	x    Gastrointestinal:	x    Genitourinary:	x    Musculoskeletal:	x    Neurological:	x    Psychiatric:	x    Hematology/Lymphatics:	x    Endocrine:	x    Allergic/Immunologic:	x    MEDICATIONS  (STANDING):  remdesivir  IVPB   IV Intermittent     MEDICATIONS  (PRN):       Vital Signs Last 24 Hrs  T(C): 38.8 (20 Aug 2024 09:38), Max: 39.1 (20 Aug 2024 00:02)  T(F): 101.8 (20 Aug 2024 09:38), Max: 102.3 (20 Aug 2024 00:02)  HR: 102 (20 Aug 2024 09:38) (84 - 102)  BP: 192/113 (20 Aug 2024 09:38) (124/74 - 192/113)  BP(mean): --  RR: 18 (20 Aug 2024 09:38) (16 - 19)  SpO2: 96% (20 Aug 2024 09:38) (96% - 100%)    Parameters below as of 20 Aug 2024 09:38  Patient On (Oxygen Delivery Method): room air    Orthostatic VS          I&O's Summary      Physical Exam:   GENERAL: NAD, well-groomed, well-developed  HEENT: ROBIN/   Atraumatic, Normocephalic  ENMT: No tonsillar erythema, exudates, or enlargement; Moist mucous membranes, Good dentition, No lesions  NECK: Supple, No JVD, Normal thyroid  CHEST/LUNG: Clear to auscultation bilaterally  CVS: Regular rate and rhythm; No murmurs, rubs, or gallops  GI: : Soft, Nontender, Nondistended; Bowel sounds present  NERVOUS SYSTEM:  Alert & Oriented X3  EXTREMITIES:  - edema  LYMPH: No lymphadenopathy noted  SKIN: No rashes or lesions  ENDOCRINOLOGY: No Thyromegaly  PSYCH: Appropriate    Labs:  Venous<39<4>>57<<7.455>>Venous<<3><<4><<5<<579>>                            9.9    9.55  )-----------( 234      ( 19 Aug 2024 23:25 )             31.1     08-20    x   |  x   |  x   ----------------------------<  x   x    |  x   |  1.25  08-19    139  |  101  |  13  ----------------------------<  89  3.2<L>   |  22  |  1.26    Ca    9.9      19 Aug 2024 23:25    TPro  6.1  /  Alb  3.2<L>  /  TBili  0.3  /  DBili  <0.2  /  AST  23  /  ALT  21  /  AlkPhos  56  08-20  TPro  7.0  /  Alb  3.6  /  TBili  0.3  /  DBili  x   /  AST  25  /  ALT  25  /  AlkPhos  66  08-19    CAPILLARY BLOOD GLUCOSE      POCT Blood Glucose.: 84 mg/dL (19 Aug 2024 18:32)    LIVER FUNCTIONS - ( 20 Aug 2024 03:33 )  Alb: 3.2 g/dL / Pro: 6.1 g/dL / ALK PHOS: 56 U/L / ALT: 21 U/L / AST: 23 U/L / GGT: x           PT/INR - ( 20 Aug 2024 03:33 )   PT: 10.8 sec;   INR: 0.95 ratio         PTT - ( 20 Aug 2024 00:30 )  PTT:25.8 sec  Urinalysis Basic - ( 19 Aug 2024 23:25 )    Color: x / Appearance: x / SG: x / pH: x  Gluc: 89 mg/dL / Ketone: x  / Bili: x / Urobili: x   Blood: x / Protein: x / Nitrite: x   Leuk Esterase: x / RBC: x / WBC x   Sq Epi: x / Non Sq Epi: x / Bacteria: x      D DImer      Studies  Chest X-RAY  CT SCAN Chest   CT Abdomen  Venous Dopplers: LE:   Others        rad< from: Xray Chest 2 Views PA/Lat (08.19.24 @ 23:55) >  ACC: [id number] EXAM:  XR CHEST PA LAT 2V   ORDERED BY: LISA LUNDBERG     PROCEDURE DATE:  08/19/2024          INTERPRETATION:  EXAMINATION: XR CHEST PA AND LATERAL    CLINICAL INDICATION: cough    TECHNIQUE: 2 views; Frontal and lateral views of the chest were obtained.    COMPARISON: CT chest 8/11/2024.    FINDINGS:    The heart is normal in size. Loop recorder.  Left mid to lower lung field patchy opacities not significantly changed   from recent chest CT.  No new focal consolidation.  There isno pneumothorax or pleural effusion.  No acute bony abnormality. Severe bilateral shoulder arthrosis.    IMPRESSION: No acute pulmonary disease.      --- End of Report ---          EAMON MUNGUIA DO; Resident Radiologist  This document has been electronically signed.  LILIANA RIVAS MD; Attending Radiologist  This document has been electronically signed. Aug 20 2024  7:51AM    < end of copied text >  < from: CT Chest No Cont (08.11.24 @ 07:13) >  the pericecal region.  PERITONEUM/RETROPERITONEUM: Within normal limits.  VESSELS: Within normal limits.  LYMPH NODES: No lymphadenopathy.  ABDOMINAL WALL: Within normal limits.  BONES: Degenerative changes.    IMPRESSION:  Compared to the chest CT from 8/2/2023, increased changes of interstitial   lung disease mainly involving the lower lobes with also increased   associated areas with subsegmental atelectasis mainly in the left lower   lobe. Bronchiectasis and linear airspace opacitiesare more pronounced as   well.. Superimposed acute on chronic infection or inflammation cannot be   excluded, particularly in the lower lobes.    Previously reported somewhat spiculated right lower lobe nodule on image   138 of series 303 measures 0.8 cm versus 0.9 cm on the prior chest CT,   stable however remains indeterminate.    Cholecystectomy.    Fluid-filled and mildly prominent small bowel loop; are nonspecific   however developing/resolving enterocolitis cannot be entirely excluded.        < end of copied text >

## 2024-08-20 NOTE — PATIENT PROFILE ADULT - FALL HARM RISK - HARM RISK INTERVENTIONS
Assistance with ambulation/Assistance OOB with selected safe patient handling equipment/Communicate Risk of Fall with Harm to all staff/Discuss with provider need for PT consult/Monitor gait and stability/Provide patient with walking aids - walker, cane, crutches/Reinforce activity limits and safety measures with patient and family/Sit up slowly, dangle for a short time, stand at bedside before walking/Tailored Fall Risk Interventions/Visual Cue: Yellow wristband and red socks/Bed in lowest position, wheels locked, appropriate side rails in place/Call bell, personal items and telephone in reach/Instruct patient to call for assistance before getting out of bed or chair/Non-slip footwear when patient is out of bed/[place] to call system/Physically safe environment - no spills, clutter or unnecessary equipment/Purposeful Proactive Rounding/Room/bathroom lighting operational, light cord in reach

## 2024-08-20 NOTE — CONSULT NOTE ADULT - ASSESSMENT
89 y/o F with PMH of DM2, HTN, HLD, CVA/TIA, CKD, and pulmonary sarcoidosis, presents c/o productive cough x 3 days. Patient was recently admitted to the hospital last week for respiratory failure with hypoxia and uti. She was discharged this past Friday with improvement in her breathing and did not need oxygen upon discharge. On Saturday she developed a productive cough that has been persistent since that time so family decided to do an at home covid test today that came back positive. They called the patients pcp who noted that the patient is not a paxlovid candidate so she was advised to come to the ED instead for further evaluation and care. Denies any other complaints or concerns. Denies chest pain, SOB, fevers, chills, abdominal pain, N/V/D/C, urinary complaints, HA, dizziness, numbness, tingling, weakness, trauma, injuries, falls, sick contacts, recent travel.  now pulm called to evaluate   pt says she has no outside pulmonologist:   she came in here with above symptoms and found to have covid infection: and hence pulm called:   she takes prednisone at home:  now here with covid infection  she is on room air at 100%:       Covid infection:   sarcoidosis:   DM  HTN  HLD  CVA/TIA  CKD    Covid infection:   -ON REMDESIVIR:   -Sheis on room air:   -she is already on 5 Mg of prednisone , th\ough covid point of view she does not need it from covid point of view:   -check d dimer:   -cxr: IMPRESSION: No acute pulmonary disease.  sarcoidosis:   -restart 5 mg of prednisone which she was taking as an outpt  DM  -monitor and control   HTN  -controlled  CVA/TIA  -cont home meds  CKD  -monitor on remdesivir:     dw team

## 2024-08-20 NOTE — H&P ADULT - HISTORY OF PRESENT ILLNESS
89 y/o F PMhx DM2, HTN, pulmonary sarcoidosis, TIA, appendectomy/ABDELRAHMAN w/ oophorectomy and salpingectomy who presented w/ cough, SOB. She was recently hospitalized for diarrhea, AMS, hypoxia and was treated for UTI s/p zosyn x 5 days. Reports has been having non-productive cough and some SOB. She developed a productive cough that has been persistent. Home covid test came back positive so PCP was called who noted that the patient is not a paxlovid candidate so she was advised to come to the ED instead for further evaluation  In ED febrile to 102.3 s/p vanc 1g x 1 and zosyn x 1. Was found to be COVID positive. Endorses chills.  Denies chest pain, SOB, abd pain, n/v, diarrhea, dysuria.

## 2024-08-20 NOTE — CONSULT NOTE ADULT - ASSESSMENT
89 y/o F with PMH of DM2, HTN, HLD, CVA/TIA, CKD, and pulmonary sarcoidosis, presents c/o productive cough x 3 days pw COVID   Hx of proteinuria   CKD stage 3a    89 y/o F with PMH of DM2, HTN, HLD, CVA/TIA, CKD, and pulmonary sarcoidosis, presents c/o productive cough x 3 days pw COVID   Hx of proteinuria   CKD stage 3b -CKD - nonproteinuric CKD3b - due to hypertension/atherosclerotic disease, +/- component of irreversible injury from past glomerulonephritis  Hypertensive urgency     1 Renal- No need for renal sono at present;  Check UA   2 CVS_Restart Norvasc now;  Hydralazine 25mg po bid if the BP remains elevated  3 Rhem- No need for stress steroids and resume Prednisone 5mg   4 Pulm-Start Steroids and on Remdesivir     [facility]   [phone]

## 2024-08-20 NOTE — CONSULT NOTE ADULT - ASSESSMENT
89 y/o F PMhx DM2, HTN, pulmonary sarcoidosis, TIA, appendectomy/ABDELRAHMAN w/ oophorectomy and salpingectomy who presented w/ cough, SOB.    COVID, fever  symptomatic  not hypoxic  at risk of progression  CXR w/ L patchy opacities   recently received 5 days of zosyn    Recommendations  continue off antibiotics  c/w remdesivir x 3 days  monitor liver enzymes while on remdesivir  trend inflammatory markers  supportive care  isolation per infection control policy   trend temps    Tj Price M.D.  [facility], Division of Infectious Diseases  [phone]  After 5pm on weekdays and all day on weekends - please call [phone]

## 2024-08-20 NOTE — H&P ADULT - NSHPPHYSICALEXAM_GEN_ALL_CORE
T(C): 38.9 (08-20-24 @ 10:29), Max: 39.1 (08-20-24 @ 00:02)  HR: 91 (08-20-24 @ 10:29) (84 - 102)  BP: 167/88 (08-20-24 @ 10:29) (124/74 - 192/113)  RR: 23 (08-20-24 @ 10:29) (16 - 23)  SpO2: 96% (08-20-24 @ 10:29) (96% - 100%)    PHYSICAL EXAM:  GENERAL: NAD, well-developed  HEAD:  Atraumatic, Normocephalic  EYES: EOMI, PERRLA, conjunctiva and sclera clear  NECK: Supple, No JVD  CHEST/LUNG: Clear to auscultation bilaterally; No wheeze  HEART: Regular rate and rhythm; No murmurs, rubs, or gallops  ABDOMEN: Soft, Nontender, Nondistended; Bowel sounds present  EXTREMITIES:  2+ Peripheral Pulses, No clubbing, cyanosis, or edema  PSYCH: AAOx3  NEUROLOGY: non-focal  SKIN: No rashes or lesions

## 2024-08-20 NOTE — H&P ADULT - ASSESSMENT
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    COVID, fever  symptomatic  not hypoxic  at risk of progression  CXR w/ L patchy opacities   recently received 5 days of zosyn  seen by ID and Pulm    plan  observe off antibiotics  c/w remdesivir x 3 days, will watch progress  monitor liver enzymes while on remdesivir  trend inflammatory markers  supportive care  isolation per infection control policy   trend temps  no need for higher dose steroids, cont prednisone 5 mg daily.   cont PPI , Norvasc and seroquel as at baseline  ISS  DVT ppx w sc lovenox

## 2024-08-20 NOTE — ED ADULT NURSE REASSESSMENT NOTE - NS ED NURSE REASSESS COMMENT FT1
pt A&Ox4 offering no complaints. ambulates to bathroom with cane. respirations even and unlabored, no acute distress noted. remains on continuous monitor, NSR noted. awaiting bed assignment, safety maintained, side rails up . call bell within reach

## 2024-08-20 NOTE — CONSULT NOTE ADULT - SUBJECTIVE AND OBJECTIVE BOX
[facility], Division of Infectious Diseases  JUAQUIN Nazario S. Shah, Y. Patel, G. Albert  [phone]  ([phone] - weekdays after 5pm and weekends)    PERRY JACOBS  89y, Female  [id number]    HPI--  HPI:  89 y/o F PMhx DM2, HTN, pulmonary sarcoidosis, TIA, appendectomy/ABDELRAHMAN w/ oophorectomy and salpingectomy who presented w/ cough, SOB. She was recently hospitalized for diarrhea, AMS, hypoxia and was treated for UTI s/p zosyn x 5 days. Reports has been having non-productive cough and some SOB. She developed a productive cough that has been persistent. Home covid test came back positive so PCP was called who noted that the patient is not a paxlovid candidate so she was advised to come to the ED instead for further evaluation  In ED febrile to 102.3 s/p vanc 1g x 1 and zosyn x 1. Was found to be COVID positive. Endorses chills.  Denies chest pain, SOB, abd pain, n/v, diarrhea, dysuria.     ROS: 10 point review of systems completed, pertinent positives and negatives as per HPI.    Allergies: IV Contrast (Hives; Rash)  sulfa drugs (Hives)  codeine (Pruritus)  shellfish (Hives)  iodine (Hives)  morphine (Pruritus)    PMH -- HTN (hypertension)    ARF (acute renal failure)    Anemia    Ectopic fetus    Sarcoidosis of lung    HLD (hyperlipidemia)    Hernia    Weakness of left arm    Mass    Murmur    CVA (cerebral vascular accident)    TIA (transient ischemic attack)      PSH -- S/P appendectomy    S/P hysterectomy    S/P knee surgery    S/P cholecystectomy    H/O cataract extraction, right      FH -- Type 2 diabetes mellitus (Sibling)      Social History -- denies tobacco, alcohol or illicit drug use    Physical Exam--  Vital Signs Last 24 Hrs  T(F): 101.8 (20 Aug 2024 09:38), Max: 102.3 (20 Aug 2024 00:02)  HR: 102 (20 Aug 2024 09:38) (84 - 102)  BP: 192/113 (20 Aug 2024 09:38) (124/74 - 192/113)  RR: 18 (20 Aug 2024 09:38) (16 - 19)  SpO2: 96% (20 Aug 2024 09:38) (96% - 100%)  General: nontoxic-appearing, no acute distress  HEENT: anicteric  Lungs: Clear bilaterally without rales  Heart: S1, S2, normal rate.   Abdomen: Soft. Nondistended. Nontender.   Neuro: no obvious focal deficits   Extremities: trace LE edema.   Skin: Warm. Dry.   Psychiatric: Appropriate affect and mood for situation.     Laboratory & Imaging Data--  CBC:                       9.9    9.55  )-----------( 234      ( 19 Aug 2024 23:25 )             31.1     WBC Count: 9.55 K/uL (08-19-24 @ 23:25)  WBC Count: 7.74 K/uL (08-16-24 @ 06:00)  WBC Count: 8.90 K/uL (08-15-24 @ 05:54)  WBC Count: 11.98 K/uL (08-14-24 @ 07:08)    CMP: 08-20    x   |  x   |  x   ----------------------------<  x   x    |  x   |  1.25    Ca    9.9      19 Aug 2024 23:25    TPro  6.1  /  Alb  3.2<L>  /  TBili  0.3  /  DBili  <0.2  /  AST  23  /  ALT  21  /  AlkPhos  56  08-20    LIVER FUNCTIONS - ( 20 Aug 2024 03:33 )  Alb: 3.2 g/dL / Pro: 6.1 g/dL / ALK PHOS: 56 U/L / ALT: 21 U/L / AST: 23 U/L / GGT: x           Urinalysis Basic - ( 19 Aug 2024 23:25 )    Color: x / Appearance: x / SG: x / pH: x  Gluc: 89 mg/dL / Ketone: x  / Bili: x / Urobili: x   Blood: x / Protein: x / Nitrite: x   Leuk Esterase: x / RBC: x / WBC x   Sq Epi: x / Non Sq Epi: x / Bacteria: x      Microbiology:     Culture - Urine (collected 08-13-24 @ 15:47)  Source: Clean Catch Clean Catch (Midstream)  Final Report (08-14-24 @ 17:55):    No growth    Culture - Blood (collected 08-11-24 @ 05:50)  Source: .Blood Blood  Final Report (08-16-24 @ 09:01):    No growth at 5 days    Culture - Blood (collected 08-11-24 @ 05:00)  Source: .Blood Blood  Final Report (08-16-24 @ 09:01):    No growth at 5 days    Urinalysis with Rflx Culture (collected 08-10-24 @ 23:20)    Culture - Urine (collected 08-10-24 @ 23:20)  Source: Clean Catch  Final Report (08-12-24 @ 14:56):    >=3 organisms. Probable collection contamination.        Radiology--  ***  Active Medications--  remdesivir  IVPB   IV Intermittent     Antimicrobials:   remdesivir  IVPB   IV Intermittent     Immunologic:

## 2024-08-20 NOTE — ED ADULT NURSE REASSESSMENT NOTE - NS ED NURSE REASSESS COMMENT FT1
Break Coverage RN: Pt A&Ox3, respirations equal and unlabored. Pt resting in stretcher at this time, offers no complaints. Pt noted to be febrile, blood cultures obtained. Medicated as per EMR orders. Pending further plan. No acute distress noted. Safety maintained, bed in lowest position, side rails raised, call bell in reach.

## 2024-08-21 LAB
ALBUMIN SERPL ELPH-MCNC: 3.2 G/DL — LOW (ref 3.3–5)
ALP SERPL-CCNC: 59 U/L — SIGNIFICANT CHANGE UP (ref 40–120)
ALT FLD-CCNC: 20 U/L — SIGNIFICANT CHANGE UP (ref 4–33)
APPEARANCE UR: CLEAR — SIGNIFICANT CHANGE UP
AST SERPL-CCNC: 27 U/L — SIGNIFICANT CHANGE UP (ref 4–32)
BILIRUB DIRECT SERPL-MCNC: <0.2 MG/DL — SIGNIFICANT CHANGE UP (ref 0–0.3)
BILIRUB INDIRECT FLD-MCNC: >0.1 MG/DL — SIGNIFICANT CHANGE UP (ref 0–1)
BILIRUB SERPL-MCNC: 0.3 MG/DL — SIGNIFICANT CHANGE UP (ref 0.2–1.2)
BILIRUB UR-MCNC: NEGATIVE — SIGNIFICANT CHANGE UP
COLOR SPEC: YELLOW — SIGNIFICANT CHANGE UP
CREAT SERPL-MCNC: 1.28 MG/DL — SIGNIFICANT CHANGE UP (ref 0.5–1.3)
D DIMER BLD IA.RAPID-MCNC: 891 NG/ML DDU — HIGH
DIFF PNL FLD: NEGATIVE — SIGNIFICANT CHANGE UP
EGFR: 41 ML/MIN/1.73M2 — LOW
GLUCOSE BLDC GLUCOMTR-MCNC: 105 MG/DL — HIGH (ref 70–99)
GLUCOSE BLDC GLUCOMTR-MCNC: 83 MG/DL — SIGNIFICANT CHANGE UP (ref 70–99)
GLUCOSE BLDC GLUCOMTR-MCNC: 91 MG/DL — SIGNIFICANT CHANGE UP (ref 70–99)
GLUCOSE BLDC GLUCOMTR-MCNC: 97 MG/DL — SIGNIFICANT CHANGE UP (ref 70–99)
GLUCOSE UR QL: NEGATIVE MG/DL — SIGNIFICANT CHANGE UP
INR BLD: 0.98 RATIO — SIGNIFICANT CHANGE UP (ref 0.85–1.18)
KETONES UR-MCNC: ABNORMAL MG/DL
LEUKOCYTE ESTERASE UR-ACNC: NEGATIVE — SIGNIFICANT CHANGE UP
NITRITE UR-MCNC: NEGATIVE — SIGNIFICANT CHANGE UP
PH UR: 6 — SIGNIFICANT CHANGE UP (ref 5–8)
PROT SERPL-MCNC: 6.5 G/DL — SIGNIFICANT CHANGE UP (ref 6–8.3)
PROT UR-MCNC: 30 MG/DL
PROTHROM AB SERPL-ACNC: 11 SEC — SIGNIFICANT CHANGE UP (ref 9.5–13)
SP GR SPEC: 1.02 — SIGNIFICANT CHANGE UP (ref 1–1.03)
UROBILINOGEN FLD QL: 0.2 MG/DL — SIGNIFICANT CHANGE UP (ref 0.2–1)

## 2024-08-21 RX ORDER — GUAIFENESIN 100 MG/5ML
100 LIQUID ORAL EVERY 6 HOURS
Refills: 0 | Status: DISCONTINUED | OUTPATIENT
Start: 2024-08-21 | End: 2024-08-22

## 2024-08-21 RX ADMIN — GUAIFENESIN 100 MILLIGRAM(S): 100 LIQUID ORAL at 23:25

## 2024-08-21 RX ADMIN — GUAIFENESIN 100 MILLIGRAM(S): 100 LIQUID ORAL at 16:32

## 2024-08-21 RX ADMIN — ACETAMINOPHEN 650 MILLIGRAM(S): 325 TABLET ORAL at 03:33

## 2024-08-21 RX ADMIN — Medication 40 MILLIGRAM(S): at 06:21

## 2024-08-21 RX ADMIN — REMDESIVIR 200 MILLIGRAM(S): 5 INJECTION INTRAVENOUS at 03:50

## 2024-08-21 RX ADMIN — Medication 25 MILLIGRAM(S): at 23:01

## 2024-08-21 RX ADMIN — Medication 5 MILLIGRAM(S): at 06:21

## 2024-08-21 RX ADMIN — AMLODIPINE BESYLATE 5 MILLIGRAM(S): 10 TABLET ORAL at 06:21

## 2024-08-21 RX ADMIN — ACETAMINOPHEN 650 MILLIGRAM(S): 325 TABLET ORAL at 03:03

## 2024-08-21 NOTE — PROGRESS NOTE ADULT - SUBJECTIVE AND OBJECTIVE BOX
Date of Service: 24 @ 13:02    Patient is a 89y old  Female who presents with a chief complaint of covid pna, sepsis (21 Aug 2024 10:11)      Any change in ROS: seems to be doing  ok : no sob ; on room air     MEDICATIONS  (STANDING):  amLODIPine   Tablet 5 milliGRAM(s) Oral daily  dextrose 5%. 1000 milliLiter(s) (50 mL/Hr) IV Continuous <Continuous>  dextrose 5%. 1000 milliLiter(s) (100 mL/Hr) IV Continuous <Continuous>  dextrose 50% Injectable 25 Gram(s) IV Push once  dextrose 50% Injectable 25 Gram(s) IV Push once  dextrose 50% Injectable 12.5 Gram(s) IV Push once  glucagon  Injectable 1 milliGRAM(s) IntraMuscular once  insulin lispro (ADMELOG) corrective regimen sliding scale   SubCutaneous three times a day before meals  pantoprazole    Tablet 40 milliGRAM(s) Oral before breakfast  predniSONE   Tablet 5 milliGRAM(s) Oral daily  QUEtiapine 25 milliGRAM(s) Oral at bedtime  remdesivir  IVPB   IV Intermittent   remdesivir  IVPB 100 milliGRAM(s) IV Intermittent every 24 hours    MEDICATIONS  (PRN):  acetaminophen     Tablet .. 650 milliGRAM(s) Oral every 6 hours PRN Temp greater or equal to 38C (100.4F), Mild Pain (1 - 3), Moderate Pain (4 - 6)  dextrose Oral Gel 15 Gram(s) Oral once PRN Blood Glucose LESS THAN 70 milliGRAM(s)/deciliter    Vital Signs Last 24 Hrs  T(C): 37.1 (21 Aug 2024 11:18), Max: 38.6 (21 Aug 2024 02:00)  T(F): 98.7 (21 Aug 2024 11:18), Max: 101.4 (21 Aug 2024 02:00)  HR: 79 (21 Aug 2024 11:18) (79 - 90)  BP: 120/74 (21 Aug 2024 11:18) (120/74 - 164/76)  BP(mean): --  RR: 17 (21 Aug 2024 11:18) (17 - 19)  SpO2: 93% (21 Aug 2024 11:18) (93% - 100%)    Parameters below as of 21 Aug 2024 11:18  Patient On (Oxygen Delivery Method): room air        I&O's Summary        Physical Exam:   GENERAL: NAD, well-groomed, well-developed  HEENT: ROBIN/   Atraumatic, Normocephalic  ENMT: No tonsillar erythema, exudates, or enlargement; Moist mucous membranes, Good dentition, No lesions  NECK: Supple, No JVD, Normal thyroid  CHEST/LUNG: Clear to auscultaion-  CVS: Regular rate and rhythm; No murmurs, rubs, or gallops  GI: : Soft, Nontender, Nondistended; Bowel sounds present  NERVOUS SYSTEM:  Alert & awake  EXTREMITIES: - edema  LYMPH: No lymphadenopathy noted  SKIN: No rashes or lesions  ENDOCRINOLOGY: No Thyromegaly  PSYCH: Appropriate    Labs:  27                            9.9    9.55  )-----------( 234      ( 19 Aug 2024 23:25 )             31.1     08-21    x   |  x   |  x   ----------------------------<  x   x    |  x   |  1.28  0820    x   |  x   |  x   ----------------------------<  x   x    |  x   |  1.25  08-19    139  |  101  |  13  ----------------------------<  89  3.2<L>   |  22  |  1.26    Ca    9.9      19 Aug 2024 23:25    TPro  6.5  /  Alb  3.2<L>  /  TBili  0.3  /  DBili  <0.2  /  AST  27  /  ALT  20  /  AlkPhos  59  08-21  TPro  6.1  /  Alb  3.2<L>  /  TBili  0.3  /  DBili  <0.2  /  AST  23  /  ALT  21  /  AlkPhos  56  08-20  TPro  7.0  /  Alb  3.6  /  TBili  0.3  /  DBili  x   /  AST  25  /  ALT  25  /  AlkPhos  66  08-19    CAPILLARY BLOOD GLUCOSE      POCT Blood Glucose.: 105 mg/dL (21 Aug 2024 12:09)  POCT Blood Glucose.: 83 mg/dL (21 Aug 2024 08:28)  POCT Blood Glucose.: 93 mg/dL (20 Aug 2024 17:23)      LIVER FUNCTIONS - ( 21 Aug 2024 06:40 )  Alb: 3.2 g/dL / Pro: 6.5 g/dL / ALK PHOS: 59 U/L / ALT: 20 U/L / AST: 27 U/L / GGT: x           PT/INR - ( 21 Aug 2024 06:40 )   PT: 11.0 sec;   INR: 0.98 ratio         PTT - ( 20 Aug 2024 00:30 )  PTT:25.8 sec  Urinalysis Basic - ( 21 Aug 2024 11:35 )    Color: Yellow / Appearance: Clear / S.020 / pH: x  Gluc: x / Ketone: Trace mg/dL  / Bili: Negative / Urobili: 0.2 mg/dL   Blood: x / Protein: 30 mg/dL / Nitrite: Negative   Leuk Esterase: Negative / RBC: 6 /HPF / WBC 2 /HPF   Sq Epi: x / Non Sq Epi: 4 /HPF / Bacteria: Negative /HPF      D-Dimer Assay, Quantitative: 891 ng/mL DDU ( @ 06:40)  D-Dimer Assay, Quantitative: 586 ng/mL DDU ( @ 11:20)        RECENT CULTURES:   @ 00:30 .Blood Blood-Peripheral                No growth at 24 hours     @ 00:15 .Blood Blood-Peripheral            rad< from: Xray Chest 2 Views PA/Lat (24 @ 23:55) >    ACC: [id number] EXAM:  XR CHEST PA LAT 2V   ORDERED BY: LISA LUNDBERG     PROCEDURE DATE:  2024          INTERPRETATION:  EXAMINATION: XR CHEST PA AND LATERAL    CLINICAL INDICATION: cough    TECHNIQUE: 2 views; Frontal and lateral views of the chest were obtained.    COMPARISON: CT chest 2024.    FINDINGS:    The heart is normal in size. Loop recorder.  Left mid to lower lung field patchy opacities not significantly changed   from recent chest CT.  No new focal consolidation.  There isno pneumothorax or pleural effusion.  No acute bony abnormality. Severe bilateral shoulder arthrosis.    IMPRESSION: No acute pulmonary disease.      --- End of Report ---          EAMON MUNGUIA DO; Resident Radiologist  This document has been electronically signed.  LILIANA RIVAS MD; Attending Radiologist  This document has been electronically signed. Aug 20 2024  7:51AM    < end of copied text >      No growth at 24 hours          RESPIRATORY CULTURES:          Studies  Chest X-RAY  CT SCAN Chest   Venous Dopplers: LE:   CT Abdomen  Others

## 2024-08-21 NOTE — PROGRESS NOTE ADULT - SUBJECTIVE AND OBJECTIVE BOX
[facility], Division of Infectious Diseases  JUAQUIN Nazario Y. Patel, S. Shah, G. Albert  [phone]  ([phone] - weekdays after 5pm and weekends)    Name: PERRY JACOBS  Age/Gender: 89y Female  MRN: [id number]    Interval History:  Notes reviewed.   No concerning overnight events.  febrile to 101.4 this AM  feels much better today  rhinorrhea resolved    Allergies: IV Contrast (Hives; Rash)  sulfa drugs (Hives)  codeine (Pruritus)  shellfish (Hives)  iodine (Hives)  morphine (Pruritus)      Objective:  Vitals:   T(F): 98.4 (08-21-24 @ 05:16), Max: 102 (08-20-24 @ 10:29)  HR: 90 (08-21-24 @ 08:30) (81 - 102)  BP: 132/82 (08-21-24 @ 08:30) (127/51 - 192/113)  RR: 17 (08-21-24 @ 08:30) (17 - 23)  SpO2: 98% (08-21-24 @ 08:30) (95% - 100%)  Physical Examination:  General: no acute distress  HEENT: anicteric  Cardio: S1, S2, normal rate  Resp: clear to auscultation anteriorly   Abd: soft, NT, ND  Ext: no LE edema  Skin: warm, dry    Laboratory Studies:  CBC:                       9.9    9.55  )-----------( 234      ( 19 Aug 2024 23:25 )             31.1     WBC Trend:  9.55 08-19-24 @ 23:25  7.74 08-16-24 @ 06:00  8.90 08-15-24 @ 05:54    CMP: 08-21    x   |  x   |  x   ----------------------------<  x   x    |  x   |  1.28    Ca    9.9      19 Aug 2024 23:25    TPro  6.5  /  Alb  3.2<L>  /  TBili  0.3  /  DBili  <0.2  /  AST  27  /  ALT  20  /  AlkPhos  59  08-21      LIVER FUNCTIONS - ( 21 Aug 2024 06:40 )  Alb: 3.2 g/dL / Pro: 6.5 g/dL / ALK PHOS: 59 U/L / ALT: 20 U/L / AST: 27 U/L / GGT: x             Urinalysis Basic - ( 19 Aug 2024 23:25 )    Color: x / Appearance: x / SG: x / pH: x  Gluc: 89 mg/dL / Ketone: x  / Bili: x / Urobili: x   Blood: x / Protein: x / Nitrite: x   Leuk Esterase: x / RBC: x / WBC x   Sq Epi: x / Non Sq Epi: x / Bacteria: x      Microbiology: reviewed     Culture - Blood (collected 08-20-24 @ 00:30)  Source: .Blood Blood-Peripheral  Preliminary Report (08-21-24 @ 07:01):    No growth at 24 hours    Culture - Blood (collected 08-20-24 @ 00:15)  Source: .Blood Blood-Peripheral  Preliminary Report (08-21-24 @ 07:01):    No growth at 24 hours    Culture - Urine (collected 08-13-24 @ 15:47)  Source: Clean Catch Clean Catch (Midstream)  Final Report (08-14-24 @ 17:55):    No growth    Culture - Blood (collected 08-11-24 @ 05:50)  Source: .Blood Blood  Final Report (08-16-24 @ 09:01):    No growth at 5 days    Culture - Blood (collected 08-11-24 @ 05:00)  Source: .Blood Blood  Final Report (08-16-24 @ 09:01):    No growth at 5 days    Urinalysis with Rflx Culture (collected 08-10-24 @ 23:20)    Culture - Urine (collected 08-10-24 @ 23:20)  Source: Clean Catch  Final Report (08-12-24 @ 14:56):    >=3 organisms. Probable collection contamination.        Radiology: reviewed     Medications:  acetaminophen     Tablet .. 650 milliGRAM(s) Oral every 6 hours PRN  amLODIPine   Tablet 5 milliGRAM(s) Oral daily  dextrose 5%. 1000 milliLiter(s) IV Continuous <Continuous>  dextrose 5%. 1000 milliLiter(s) IV Continuous <Continuous>  dextrose 50% Injectable 25 Gram(s) IV Push once  dextrose 50% Injectable 12.5 Gram(s) IV Push once  dextrose 50% Injectable 25 Gram(s) IV Push once  dextrose Oral Gel 15 Gram(s) Oral once PRN  glucagon  Injectable 1 milliGRAM(s) IntraMuscular once  insulin lispro (ADMELOG) corrective regimen sliding scale   SubCutaneous three times a day before meals  pantoprazole    Tablet 40 milliGRAM(s) Oral before breakfast  predniSONE   Tablet 5 milliGRAM(s) Oral daily  QUEtiapine 25 milliGRAM(s) Oral at bedtime  remdesivir  IVPB   IV Intermittent   remdesivir  IVPB 100 milliGRAM(s) IV Intermittent every 24 hours    Antimicrobials:  remdesivir  IVPB   IV Intermittent   remdesivir  IVPB 100 milliGRAM(s) IV Intermittent every 24 hours

## 2024-08-21 NOTE — PROGRESS NOTE ADULT - SUBJECTIVE AND OBJECTIVE BOX
Patient is a 89y old  Female who presents with a chief complaint of covid pna, sepsis (21 Aug 2024 13:02)      SUBJECTIVE / OVERNIGHT EVENTS: no new events, feels significantly better, tomorrow is the last day of remdesivir.     MEDICATIONS  (STANDING):  amLODIPine   Tablet 5 milliGRAM(s) Oral daily  dextrose 5%. 1000 milliLiter(s) (50 mL/Hr) IV Continuous <Continuous>  dextrose 5%. 1000 milliLiter(s) (100 mL/Hr) IV Continuous <Continuous>  dextrose 50% Injectable 25 Gram(s) IV Push once  dextrose 50% Injectable 25 Gram(s) IV Push once  dextrose 50% Injectable 12.5 Gram(s) IV Push once  glucagon  Injectable 1 milliGRAM(s) IntraMuscular once  insulin lispro (ADMELOG) corrective regimen sliding scale   SubCutaneous three times a day before meals  pantoprazole    Tablet 40 milliGRAM(s) Oral before breakfast  predniSONE   Tablet 5 milliGRAM(s) Oral daily  QUEtiapine 25 milliGRAM(s) Oral at bedtime  remdesivir  IVPB   IV Intermittent   remdesivir  IVPB 100 milliGRAM(s) IV Intermittent every 24 hours    MEDICATIONS  (PRN):  acetaminophen     Tablet .. 650 milliGRAM(s) Oral every 6 hours PRN Temp greater or equal to 38C (100.4F), Mild Pain (1 - 3), Moderate Pain (4 - 6)  dextrose Oral Gel 15 Gram(s) Oral once PRN Blood Glucose LESS THAN 70 milliGRAM(s)/deciliter  guaiFENesin Oral Liquid (Sugar-Free) 100 milliGRAM(s) Oral every 6 hours PRN Cough      Vital Signs Last 24 Hrs  T(F): 99.1 (24 @ 18:34), Max: 101.4 (24 @ 02:00)  HR: 82 (24 @ 18:34) (79 - 90)  BP: 153/86 (24 @ 18:34) (119/72 - 153/86)  RR: 18 (24 @ 18:34) (17 - 18)  SpO2: 97% (24 @ 18:34) (93% - 99%)  Telemetry:   CAPILLARY BLOOD GLUCOSE      POCT Blood Glucose.: 97 mg/dL (21 Aug 2024 17:07)  POCT Blood Glucose.: 105 mg/dL (21 Aug 2024 12:09)  POCT Blood Glucose.: 83 mg/dL (21 Aug 2024 08:28)    I&O's Summary      PHYSICAL EXAM:  GENERAL: NAD, well-developed  HEAD:  Atraumatic, Normocephalic  EYES: EOMI, PERRLA, conjunctiva and sclera clear  NECK: Supple, No JVD  CHEST/LUNG: Clear to auscultation bilaterally; No wheeze  HEART: Regular rate and rhythm; No murmurs, rubs, or gallops  ABDOMEN: Soft, Nontender, Nondistended; Bowel sounds present  EXTREMITIES:  2+ Peripheral Pulses, No clubbing, cyanosis, or edema  PSYCH: AAOx3  NEUROLOGY: non-focal  SKIN: No rashes or lesions    LABS:                        9.9    9.55  )-----------( 234      ( 19 Aug 2024 23:25 )             31.1     08-21    x   |  x   |  x   ----------------------------<  x   x    |  x   |  1.28    Ca    9.9      19 Aug 2024 23:25    TPro  6.5  /  Alb  3.2<L>  /  TBili  0.3  /  DBili  <0.2  /  AST  27  /  ALT  20  /  AlkPhos  59  08-21    PT/INR - ( 21 Aug 2024 06:40 )   PT: 11.0 sec;   INR: 0.98 ratio         PTT - ( 20 Aug 2024 00:30 )  PTT:25.8 sec      Urinalysis Basic - ( 21 Aug 2024 11:35 )    Color: Yellow / Appearance: Clear / S.020 / pH: x  Gluc: x / Ketone: Trace mg/dL  / Bili: Negative / Urobili: 0.2 mg/dL   Blood: x / Protein: 30 mg/dL / Nitrite: Negative   Leuk Esterase: Negative / RBC: 6 /HPF / WBC 2 /HPF   Sq Epi: x / Non Sq Epi: 4 /HPF / Bacteria: Negative /HPF        RADIOLOGY & ADDITIONAL TESTS:    Imaging Personally Reviewed:    Consultant(s) Notes Reviewed:      Care Discussed with Consultants/Other Providers:

## 2024-08-21 NOTE — PROGRESS NOTE ADULT - SUBJECTIVE AND OBJECTIVE BOX
NEPHROLOGY     Patient seen and examined having breakfast, reports feeling better, less coughing, no sob, comfortable on room air, denies pain, in no acute distress.     MEDICATIONS  (STANDING):  amLODIPine   Tablet 5 milliGRAM(s) Oral daily  dextrose 5%. 1000 milliLiter(s) (50 mL/Hr) IV Continuous <Continuous>  dextrose 5%. 1000 milliLiter(s) (100 mL/Hr) IV Continuous <Continuous>  dextrose 50% Injectable 25 Gram(s) IV Push once  dextrose 50% Injectable 25 Gram(s) IV Push once  dextrose 50% Injectable 12.5 Gram(s) IV Push once  glucagon  Injectable 1 milliGRAM(s) IntraMuscular once  insulin lispro (ADMELOG) corrective regimen sliding scale   SubCutaneous three times a day before meals  pantoprazole    Tablet 40 milliGRAM(s) Oral before breakfast  predniSONE   Tablet 5 milliGRAM(s) Oral daily  QUEtiapine 25 milliGRAM(s) Oral at bedtime  remdesivir  IVPB   IV Intermittent   remdesivir  IVPB 100 milliGRAM(s) IV Intermittent every 24 hours    VITALS:  T(C): , Max: 38.9 (08-20-24 @ 10:29)  T(F): , Max: 102 (08-20-24 @ 10:29)  HR: 90 (08-21-24 @ 08:30)  BP: 132/82 (08-21-24 @ 08:30)  RR: 17 (08-21-24 @ 08:30)  SpO2: 98% (08-21-24 @ 08:30)    I and O's:    Height (cm): 154.9 (08-20 @ 16:30)  Weight (kg): 59 (08-20 @ 16:30)  BMI (kg/m2): 24.6 (08-20 @ 16:30)  BSA (m2): 1.57 (08-20 @ 16:30)    PHYSICAL EXAM:  Constitutional: NAD  HEENT: EOMI  Neck:  No JVD, supple   Respiratory: CTA B/L  Cardiovascular: S1 and S2, RRR  Gastrointestinal: + BS, soft, NT, ND  Extremities: No peripheral edema, + peripheral pulses  Neurological: A/O x 3, CN2-12 intact  Psychiatric: Normal mood, normal affect  : No Jansen  Skin: No rashes, C/D/I    LABS:                        9.9    9.55  )-----------( 234      ( 19 Aug 2024 23:25 )             31.1     08-21    x   |  x   |  x   ----------------------------<  x   x    |  x   |  1.28    Ca    9.9      19 Aug 2024 23:25    TPro  6.5  /  Alb  3.2<L>  /  TBili  0.3  /  DBili  <0.2  /  AST  27  /  ALT  20  /  AlkPhos  59  08-21    ASSESSMENT/PLAN:  88 y/o F with PMH of DM2, HTN, HLD, CVA/TIA, CKD, and pulmonary sarcoidosis, presents c/o productive cough x 3 days pw COVID   Hx of proteinuria   CKD stage 3b -CKD - nonproteinuric CKD3b - due to hypertension/atherosclerotic disease, +/- 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   Febrile     MEDICATIONS  (STANDING):  amLODIPine   Tablet 5 milliGRAM(s) Oral daily  dextrose 5%. 1000 milliLiter(s) (50 mL/Hr) IV Continuous <Continuous>  dextrose 5%. 1000 milliLiter(s) (100 mL/Hr) IV Continuous <Continuous>  dextrose 50% Injectable 25 Gram(s) IV Push once  dextrose 50% Injectable 25 Gram(s) IV Push once  dextrose 50% Injectable 12.5 Gram(s) IV Push once  glucagon  Injectable 1 milliGRAM(s) IntraMuscular once  insulin lispro (ADMELOG) corrective regimen sliding scale   SubCutaneous three times a day before meals  pantoprazole    Tablet 40 milliGRAM(s) Oral before breakfast  predniSONE   Tablet 5 milliGRAM(s) Oral daily  QUEtiapine 25 milliGRAM(s) Oral at bedtime  remdesivir  IVPB   IV Intermittent   remdesivir  IVPB 100 milliGRAM(s) IV Intermittent every 24 hours    VITALS:  T(C): , Max: 38.9 (08-20-24 @ 10:29)  T(F): , Max: 102 (08-20-24 @ 10:29)  HR: 90 (08-21-24 @ 08:30)  BP: 132/82 (08-21-24 @ 08:30)  RR: 17 (08-21-24 @ 08:30)  SpO2: 98% (08-21-24 @ 08:30)    I and O's:    Height (cm): 154.9 (08-20 @ 16:30)  Weight (kg): 59 (08-20 @ 16:30)  BMI (kg/m2): 24.6 (08-20 @ 16:30)  BSA (m2): 1.57 (08-20 @ 16:30)    PHYSICAL EXAM:  Constitutional: NAD  HEENT: EOMI  Neck:  No JVD, supple   Respiratory: CTA B/L  Cardiovascular: S1 and S2, RRR  Gastrointestinal: + BS, soft, NT, ND  Extremities: No peripheral edema, + peripheral pulses  Neurological: A/O x 3, CN2-12 intact  Psychiatric: Normal mood, normal affect  : No Jansen  Skin: No rashes, C/D/I    LABS:                        9.9    9.55  )-----------( 234      ( 19 Aug 2024 23:25 )             31.1     08-21    x   |  x   |  x   ----------------------------<  x   x    |  x   |  1.28    Ca    9.9      19 Aug 2024 23:25    TPro  6.5  /  Alb  3.2<L>  /  TBili  0.3  /  DBili  <0.2  /  AST  27  /  ALT  20  /  AlkPhos  59  08-21

## 2024-08-22 ENCOUNTER — TRANSCRIPTION ENCOUNTER (OUTPATIENT)
Age: 89
End: 2024-08-22

## 2024-08-22 ENCOUNTER — RESULT REVIEW (OUTPATIENT)
Age: 89
End: 2024-08-22

## 2024-08-22 VITALS
SYSTOLIC BLOOD PRESSURE: 131 MMHG | TEMPERATURE: 98 F | DIASTOLIC BLOOD PRESSURE: 67 MMHG | OXYGEN SATURATION: 97 % | HEART RATE: 73 BPM | RESPIRATION RATE: 18 BRPM

## 2024-08-22 LAB
ALBUMIN SERPL ELPH-MCNC: 3.3 G/DL — SIGNIFICANT CHANGE UP (ref 3.3–5)
ALBUMIN SERPL ELPH-MCNC: 3.4 G/DL — SIGNIFICANT CHANGE UP (ref 3.3–5)
ALP SERPL-CCNC: 65 U/L — SIGNIFICANT CHANGE UP (ref 40–120)
ALP SERPL-CCNC: 65 U/L — SIGNIFICANT CHANGE UP (ref 40–120)
ALT FLD-CCNC: 15 U/L — SIGNIFICANT CHANGE UP (ref 4–33)
ALT FLD-CCNC: 15 U/L — SIGNIFICANT CHANGE UP (ref 4–33)
ANION GAP SERPL CALC-SCNC: 17 MMOL/L — HIGH (ref 7–14)
AST SERPL-CCNC: 24 U/L — SIGNIFICANT CHANGE UP (ref 4–32)
AST SERPL-CCNC: 24 U/L — SIGNIFICANT CHANGE UP (ref 4–32)
BASOPHILS # BLD AUTO: 0.02 K/UL — SIGNIFICANT CHANGE UP (ref 0–0.2)
BASOPHILS NFR BLD AUTO: 0.3 % — SIGNIFICANT CHANGE UP (ref 0–2)
BILIRUB DIRECT SERPL-MCNC: <0.2 MG/DL — SIGNIFICANT CHANGE UP (ref 0–0.3)
BILIRUB INDIRECT FLD-MCNC: >0 MG/DL — SIGNIFICANT CHANGE UP (ref 0–1)
BILIRUB SERPL-MCNC: 0.2 MG/DL — SIGNIFICANT CHANGE UP (ref 0.2–1.2)
BILIRUB SERPL-MCNC: 0.3 MG/DL — SIGNIFICANT CHANGE UP (ref 0.2–1.2)
BUN SERPL-MCNC: 27 MG/DL — HIGH (ref 7–23)
CALCIUM SERPL-MCNC: 9.4 MG/DL — SIGNIFICANT CHANGE UP (ref 8.4–10.5)
CHLORIDE SERPL-SCNC: 101 MMOL/L — SIGNIFICANT CHANGE UP (ref 98–107)
CO2 SERPL-SCNC: 19 MMOL/L — LOW (ref 22–31)
CREAT SERPL-MCNC: 1.37 MG/DL — HIGH (ref 0.5–1.3)
CREAT SERPL-MCNC: 1.38 MG/DL — HIGH (ref 0.5–1.3)
D DIMER BLD IA.RAPID-MCNC: 844 NG/ML DDU — HIGH
EGFR: 37 ML/MIN/1.73M2 — LOW
EGFR: 37 ML/MIN/1.73M2 — LOW
EOSINOPHIL # BLD AUTO: 0.27 K/UL — SIGNIFICANT CHANGE UP (ref 0–0.5)
EOSINOPHIL NFR BLD AUTO: 3.5 % — SIGNIFICANT CHANGE UP (ref 0–6)
GLUCOSE BLDC GLUCOMTR-MCNC: 119 MG/DL — HIGH (ref 70–99)
GLUCOSE BLDC GLUCOMTR-MCNC: 85 MG/DL — SIGNIFICANT CHANGE UP (ref 70–99)
GLUCOSE BLDC GLUCOMTR-MCNC: 93 MG/DL — SIGNIFICANT CHANGE UP (ref 70–99)
GLUCOSE SERPL-MCNC: 78 MG/DL — SIGNIFICANT CHANGE UP (ref 70–99)
HCT VFR BLD CALC: 32.8 % — LOW (ref 34.5–45)
HGB BLD-MCNC: 10.5 G/DL — LOW (ref 11.5–15.5)
IANC: 5.66 K/UL — SIGNIFICANT CHANGE UP (ref 1.8–7.4)
IMM GRANULOCYTES NFR BLD AUTO: 0.4 % — SIGNIFICANT CHANGE UP (ref 0–0.9)
INR BLD: 0.97 RATIO — SIGNIFICANT CHANGE UP (ref 0.85–1.18)
LYMPHOCYTES # BLD AUTO: 1.28 K/UL — SIGNIFICANT CHANGE UP (ref 1–3.3)
LYMPHOCYTES # BLD AUTO: 16.5 % — SIGNIFICANT CHANGE UP (ref 13–44)
MAGNESIUM SERPL-MCNC: 1.6 MG/DL — SIGNIFICANT CHANGE UP (ref 1.6–2.6)
MCHC RBC-ENTMCNC: 26.9 PG — LOW (ref 27–34)
MCHC RBC-ENTMCNC: 32 GM/DL — SIGNIFICANT CHANGE UP (ref 32–36)
MCV RBC AUTO: 84.1 FL — SIGNIFICANT CHANGE UP (ref 80–100)
MONOCYTES # BLD AUTO: 0.5 K/UL — SIGNIFICANT CHANGE UP (ref 0–0.9)
MONOCYTES NFR BLD AUTO: 6.4 % — SIGNIFICANT CHANGE UP (ref 2–14)
MRSA PCR RESULT.: SIGNIFICANT CHANGE UP
NEUTROPHILS # BLD AUTO: 5.66 K/UL — SIGNIFICANT CHANGE UP (ref 1.8–7.4)
NEUTROPHILS NFR BLD AUTO: 72.9 % — SIGNIFICANT CHANGE UP (ref 43–77)
NRBC # BLD: 0 /100 WBCS — SIGNIFICANT CHANGE UP (ref 0–0)
NRBC # FLD: 0 K/UL — SIGNIFICANT CHANGE UP (ref 0–0)
PHOSPHATE SERPL-MCNC: 2.7 MG/DL — SIGNIFICANT CHANGE UP (ref 2.5–4.5)
PLATELET # BLD AUTO: 209 K/UL — SIGNIFICANT CHANGE UP (ref 150–400)
POTASSIUM SERPL-MCNC: 3.3 MMOL/L — LOW (ref 3.5–5.3)
POTASSIUM SERPL-SCNC: 3.3 MMOL/L — LOW (ref 3.5–5.3)
PROT SERPL-MCNC: 6.8 G/DL — SIGNIFICANT CHANGE UP (ref 6–8.3)
PROT SERPL-MCNC: 6.8 G/DL — SIGNIFICANT CHANGE UP (ref 6–8.3)
PROTHROM AB SERPL-ACNC: 11 SEC — SIGNIFICANT CHANGE UP (ref 9.5–13)
RBC # BLD: 3.9 M/UL — SIGNIFICANT CHANGE UP (ref 3.8–5.2)
RBC # FLD: 15.1 % — HIGH (ref 10.3–14.5)
S AUREUS DNA NOSE QL NAA+PROBE: SIGNIFICANT CHANGE UP
SODIUM SERPL-SCNC: 137 MMOL/L — SIGNIFICANT CHANGE UP (ref 135–145)
WBC # BLD: 7.76 K/UL — SIGNIFICANT CHANGE UP (ref 3.8–10.5)
WBC # FLD AUTO: 7.76 K/UL — SIGNIFICANT CHANGE UP (ref 3.8–10.5)

## 2024-08-22 PROCEDURE — 93970 EXTREMITY STUDY: CPT | Mod: 26

## 2024-08-22 RX ORDER — POTASSIUM CHLORIDE 10 MEQ
40 TABLET, EXT RELEASE, PARTICLES/CRYSTALS ORAL ONCE
Refills: 0 | Status: COMPLETED | OUTPATIENT
Start: 2024-08-22 | End: 2024-08-22

## 2024-08-22 RX ORDER — PREDNISONE 10 MG
2 TABLET, DOSE PACK ORAL
Qty: 8 | Refills: 0
Start: 2024-08-22 | End: 2024-08-25

## 2024-08-22 RX ORDER — PREDNISONE 10 MG
40 TABLET, DOSE PACK ORAL ONCE
Refills: 0 | Status: COMPLETED | OUTPATIENT
Start: 2024-08-22 | End: 2024-08-22

## 2024-08-22 RX ORDER — GUAIFENESIN 100 MG/5ML
5 LIQUID ORAL
Qty: 100 | Refills: 0
Start: 2024-08-22 | End: 2024-08-26

## 2024-08-22 RX ORDER — PREDNISONE 10 MG
1 TABLET, DOSE PACK ORAL
Qty: 30 | Refills: 0
Start: 2024-08-22 | End: 2024-09-20

## 2024-08-22 RX ORDER — PREDNISONE 10 MG
1 TABLET, DOSE PACK ORAL
Qty: 4 | Refills: 0
Start: 2024-08-22 | End: 2024-08-25

## 2024-08-22 RX ADMIN — GUAIFENESIN 100 MILLIGRAM(S): 100 LIQUID ORAL at 05:50

## 2024-08-22 RX ADMIN — Medication 40 MILLIGRAM(S): at 05:30

## 2024-08-22 RX ADMIN — REMDESIVIR 200 MILLIGRAM(S): 5 INJECTION INTRAVENOUS at 03:31

## 2024-08-22 RX ADMIN — Medication 40 MILLIGRAM(S): at 17:38

## 2024-08-22 RX ADMIN — Medication 5 MILLIGRAM(S): at 05:30

## 2024-08-22 RX ADMIN — AMLODIPINE BESYLATE 5 MILLIGRAM(S): 10 TABLET ORAL at 05:30

## 2024-08-22 RX ADMIN — Medication 40 MILLIEQUIVALENT(S): at 07:22

## 2024-08-22 NOTE — DISCHARGE NOTE PROVIDER - HOSPITAL COURSE
88 y/o F with PMH of DM2, HTN, HLD, CVA/TIA, CKD, and pulmonary sarcoidosis, presents c/o productive cough x 3 days.    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 Zheng, patient is medically cleared and optimized for discharge today. All medications were reviewed with attending, and sent to mutually agreed upon pharmacy.   88 y/o F with PMH of DM2, HTN, HLD, CVA/TIA, CKD, and pulmonary sarcoidosis, presents c/o productive cough x 3 days.    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 Zheng, patient is medically cleared and optimized for discharge today. All medications were reviewed with attending, and sent to mutually agreed upon pharmacy.

## 2024-08-22 NOTE — DISCHARGE NOTE NURSING/CASE MANAGEMENT/SOCIAL WORK - NSDCVIVACCINE_GEN_ALL_CORE_FT
influenza, injectable, quadrivalent, preservative free; 10-Sep-2015 18:46; Abiola Boyer (RN); [employer]; [id number]; IntraMuscular; Deltoid Right.; 0.5 milliLiter(s); VIS (VIS Published: 19-Aug-2014, VIS Presented: 10-Sep-2015);

## 2024-08-22 NOTE — PROGRESS NOTE ADULT - SUBJECTIVE AND OBJECTIVE BOX
[facility], Division of Infectious Diseases  JUAQUIN Nazario Y. Patel, S. Shah, G. Albert  [phone]  ([phone] - weekdays after 5pm and weekends)    Name: PERRY JACOBS  Age/Gender: 89y Female  MRN: [id number]    Interval History:  Notes reviewed.   No concerning overnight events.  Afebrile.   states feels good  denies any complaints    Allergies: IV Contrast (Hives; Rash)  sulfa drugs (Hives)  codeine (Pruritus)  shellfish (Hives)  iodine (Hives)  morphine (Pruritus)      Objective:  Vitals:   T(F): 98.4 (08-22-24 @ 09:30), Max: 99.5 (08-21-24 @ 21:49)  HR: 71 (08-22-24 @ 09:30) (71 - 91)  BP: 132/57 (08-22-24 @ 09:30) (119/72 - 153/86)  RR: 16 (08-22-24 @ 09:30) (16 - 18)  SpO2: 95% (08-22-24 @ 09:30) (95% - 99%)  Physical Examination:  General: no acute distress  HEENT: anicteric  Cardio: normal rate  Resp: breathing comfortably on RA   Abd: soft, NT, ND  Ext: no LE edema  Skin: warm, dry    Laboratory Studies:  CBC:                       10.5   7.76  )-----------( 209      ( 22 Aug 2024 05:25 )             32.8     WBC Trend:  7.76 08-22-24 @ 05:25  9.55 08-19-24 @ 23:25  7.74 08-16-24 @ 06:00    CMP: 08-22    137  |  101  |  27<H>  ----------------------------<  78  3.3<L>   |  19<L>  |  1.37<H>    Ca    9.4      22 Aug 2024 05:25  Phos  2.7     08-22  Mg     1.60     08-22    TPro  6.8  /  Alb  3.4  /  TBili  0.3  /  DBili  <0.2  /  AST  24  /  ALT  15  /  AlkPhos  65  08-22      LIVER FUNCTIONS - ( 22 Aug 2024 05:25 )  Alb: 3.4 g/dL / Pro: 6.8 g/dL / ALK PHOS: 65 U/L / ALT: 15 U/L / AST: 24 U/L / GGT: x             Urinalysis Basic - ( 22 Aug 2024 05:25 )    Color: x / Appearance: x / SG: x / pH: x  Gluc: 78 mg/dL / Ketone: x  / Bili: x / Urobili: x   Blood: x / Protein: x / Nitrite: x   Leuk Esterase: x / RBC: x / WBC x   Sq Epi: x / Non Sq Epi: x / Bacteria: x      Microbiology: reviewed     Culture - Blood (collected 08-20-24 @ 00:30)  Source: .Blood Blood-Peripheral  Preliminary Report (08-22-24 @ 07:00):    No growth at 48 Hours    Culture - Blood (collected 08-20-24 @ 00:15)  Source: .Blood Blood-Peripheral  Preliminary Report (08-22-24 @ 07:00):    No growth at 48 Hours    Culture - Urine (collected 08-13-24 @ 15:47)  Source: Clean Catch Clean Catch (Midstream)  Final Report (08-14-24 @ 17:55):    No growth    Culture - Blood (collected 08-11-24 @ 05:50)  Source: .Blood Blood  Final Report (08-16-24 @ 09:01):    No growth at 5 days    Culture - Blood (collected 08-11-24 @ 05:00)  Source: .Blood Blood  Final Report (08-16-24 @ 09:01):    No growth at 5 days    Urinalysis with Rflx Culture (collected 08-10-24 @ 23:20)    Culture - Urine (collected 08-10-24 @ 23:20)  Source: Clean Catch  Final Report (08-12-24 @ 14:56):    >=3 organisms. Probable collection contamination.        Radiology: reviewed     Medications:  acetaminophen     Tablet .. 650 milliGRAM(s) Oral every 6 hours PRN  amLODIPine   Tablet 5 milliGRAM(s) Oral daily  dextrose 5%. 1000 milliLiter(s) IV Continuous <Continuous>  dextrose 5%. 1000 milliLiter(s) IV Continuous <Continuous>  dextrose 50% Injectable 25 Gram(s) IV Push once  dextrose 50% Injectable 12.5 Gram(s) IV Push once  dextrose 50% Injectable 25 Gram(s) IV Push once  dextrose Oral Gel 15 Gram(s) Oral once PRN  glucagon  Injectable 1 milliGRAM(s) IntraMuscular once  guaiFENesin Oral Liquid (Sugar-Free) 100 milliGRAM(s) Oral every 6 hours PRN  insulin lispro (ADMELOG) corrective regimen sliding scale   SubCutaneous three times a day before meals  pantoprazole    Tablet 40 milliGRAM(s) Oral before breakfast  predniSONE   Tablet 5 milliGRAM(s) Oral daily  QUEtiapine 25 milliGRAM(s) Oral at bedtime    Antimicrobials:

## 2024-08-22 NOTE — DISCHARGE NOTE NURSING/CASE MANAGEMENT/SOCIAL WORK - PATIENT PORTAL LINK FT
You can access the FollowMyHealth Patient Portal offered by [facility] by registering at the following website: [email or web address]. By joining [facility]’s FollowMyHealth portal, you will also be able to view your health information using other applications (apps) compatible with our system.

## 2024-08-22 NOTE — DISCHARGE NOTE PROVIDER - NSDCCPCAREPLAN_GEN_ALL_CORE_FT
PRINCIPAL DISCHARGE DIAGNOSIS  Diagnosis: 2019 novel coronavirus disease (COVID-19)  Assessment and Plan of Treatment: You were admitted for COVID. You were treated with antivirals. You were started on steroids, prednisone 40mg, resume prednisone 5mg after you complete prednisone 40mg. Follow up with PCP.      SECONDARY DISCHARGE DIAGNOSES  Diagnosis: Chronic kidney disease, unspecified  Assessment and Plan of Treatment: Continue blood pressure, cholesterol and diabetic medications. Goal of hemoglobin A1C (HgbA1C) < 7%.  Avoid nephrotoxic drugs such as nonsteroidal anti-inflammatory agents (NSAIDs).   Please follow up with your nephrologist to monitor your kidney function, continue with low protein and potassium diet.      Diagnosis: Diabetes mellitus  Assessment and Plan of Treatment: A1c 5.9, stop metformin. Monitor finger sticks pre-meal and bedtime, low salt, fat and carbohydrate diet, minimize glucose intake.  Exercise daily for at least 30 minutes and weight loss.  Follow up with primary care physician and endocrinologist for routine Hemoglobin A1C checks and management.  Follow up with your ophthalmologist for routine yearly vision exams.      Diagnosis: HTN (hypertension)  Assessment and Plan of Treatment: Low sodium and fat diet, continue anti-hypertensive medications, and follow up with primary care physician.      Diagnosis: Pulmonary sarcoidosis  Assessment and Plan of Treatment: Continue with 5mg prednisone, after you complete prednisone 40 mg course. Follow up with pulmonary and rheumatolgy as outpatient.

## 2024-08-22 NOTE — DISCHARGE NOTE PROVIDER - NSDCFUSCHEDAPPT_GEN_ALL_CORE_FT
Regina Smith  [facility]  GASTRO [address] [facility]  Scheduled Appointment: 09/11/2024    
no

## 2024-08-22 NOTE — PROGRESS NOTE ADULT - ASSESSMENT
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    COVID, fever  symptomatic  not hypoxic  at risk of progression  CXR w/ L patchy opacities   recently received 5 days of zosyn  seen by ID and Pulm    plan  observe off antibiotics  feels significantly better  completed 3 days of  remdesivir  diffuse wheezing, will give 5 days of prednisone 40 mg daily and then can go back to her baseline dose of 5 mg  cont PPI , Norvasc and seroquel as at baseline  ISS  HA1C is 5.9, GFR <40, dc Metformin  DVT ppx w sc lovenox  
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    COVID, fever  symptomatic  not hypoxic  at risk of progression  CXR w/ L patchy opacities   recently received 5 days of zosyn  seen by ID and Pulm    plan  observe off antibiotics  feels significantly better  c/w remdesivir x 3 days through 8/22  monitor liver enzymes while on remdesivir  trend inflammatory markers  supportive care  isolation per infection control policy   trend temps  no need for higher dose steroids, cont prednisone 5 mg daily.   cont PPI , Norvasc and seroquel as at baseline  ISS  DVT ppx w sc lovenox  
89 y/o F PMhx DM2, HTN, pulmonary sarcoidosis, TIA, appendectomy/ABDELRAHMAN w/ oophorectomy and salpingectomy who presented w/ cough, SOB.    COVID, fever  symptomatic  not hypoxic  at risk of progression  CXR w/ L patchy opacities   recently received 5 days of zosyn    Recommendations  continue off antibiotics  c/w remdesivir x 3 days w/ last day tomorrow  monitor liver enzymes while on remdesivir  trend inflammatory markers  f/u blood cx- NGTD    supportive care  isolation per infection control policy   trend jerrod Price M.D.  [facility], Division of Infectious Diseases  [phone]  After 5pm on weekdays and all day on weekends - please call [phone] 
88 y/o F PMhx DM2, HTN, pulmonary sarcoidosis, TIA, appendectomy/ABDELRAHMAN w/ oophorectomy and salpingectomy who presented w/ cough, SOB.    COVID  not hypoxic  at risk of progression  CXR w/ L patchy opacities  previously received 5 days of zosyn  blood cx- NGTD  fever resolved    Recommendations  continue off antibiotics  s/p remdesivir x 3 days, completed this AM  isolation per infection control policy   discharge planning    Tj Price M.D.  [facility], Division of Infectious Diseases  [phone]  After 5pm on weekdays and all day on weekends - please call [phone] 
88 y/o F with PMH of DM2, HTN, HLD, CVA/TIA, CKD, and pulmonary sarcoidosis, presents c/o productive cough x 3 days. Patient was recently admitted to the hospital last week for respiratory failure with hypoxia and uti. She was discharged this past Friday with improvement in her breathing and did not need oxygen upon discharge. On Saturday she developed a productive cough that has been persistent since that time so family decided to do an at home covid test today that came back positive. They called the patients pcp who noted that the patient is not a paxlovid candidate so she was advised to come to the ED instead for further evaluation and care. Denies any other complaints or concerns. Denies chest pain, SOB, fevers, chills, abdominal pain, N/V/D/C, urinary complaints, HA, dizziness, numbness, tingling, weakness, trauma, injuries, falls, sick contacts, recent travel.  now pulm called to evaluate   pt says she has no outside pulmonologist:   she came in here with above symptoms and found to have covid infection: and hence pulm called:   she takes prednisone at home:  now here with covid infection  she is on room air at 100%:       Covid infection:   sarcoidosis:   DM  HTN  HLD  CVA/TIA  CKD    Covid infection:   -ON REMDESIVIR:   -Sheis on room air:   -she is already on 5 Mg of prednisone , through covid point of view she does not need it from covid point of view:   -check d dimer:   -cxr: IMPRESSION: No acute pulmonary disease.  8/21:  -on remdesivir:   -still with fever;   -dw id:  cont current treatemnt  sarcoidosis:   -restart 5 mg of prednisone which she was taking as an outpt  8/21: cont prednisone  DM  -monitor and control   HTN  -controlled  CVA/TIA  -cont home meds  CKD  -monitor on remdesivir:     dw ID  
88 y/o F with PMH of DM2, HTN, HLD, CVA/TIA, CKD, and pulmonary sarcoidosis, presents c/o productive cough x 3 days. Patient was recently admitted to the hospital last week for respiratory failure with hypoxia and uti. She was discharged this past Friday with improvement in her breathing and did not need oxygen upon discharge. On Saturday she developed a productive cough that has been persistent since that time so family decided to do an at home covid test today that came back positive. They called the patients pcp who noted that the patient is not a paxlovid candidate so she was advised to come to the ED instead for further evaluation and care. Denies any other complaints or concerns. Denies chest pain, SOB, fevers, chills, abdominal pain, N/V/D/C, urinary complaints, HA, dizziness, numbness, tingling, weakness, trauma, injuries, falls, sick contacts, recent travel.  now pulm called to evaluate   pt says she has no outside pulmonologist:   she came in here with above symptoms and found to have covid infection: and hence pulm called:   she takes prednisone at home:  now here with covid infection  she is on room air at 100%:       Covid infection:   sarcoidosis:   DM  HTN  HLD  CVA/TIA  CKD    Covid infection:   -ON REMDESIVIR:   -Sheis on room air:   -she is already on 5 Mg of prednisone , through covid point of view she does not need it from covid point of view:   -check d dimer:   -cxr: IMPRESSION: No acute pulmonary disease.  8/21:  -on remdesivir:   -still with fever;   -dw id:  cont current treatment-  8/22:  -finished remdesivir:   -on room air:  she is alert and awake and responding to questions  d dimer has downtrended :  -pt looks very comfortable  sarcoidosis:   -restart 5 mg of prednisone which she was taking as an outpt  8/21: cont prednisone  8/22: cont prednisone   DM  -monitor and control   HTN  -controlled  CVA/TIA  -cont home meds  CKD  -monitor on remdesivir:     dw ID  
  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   [phone]

## 2024-08-22 NOTE — PROGRESS NOTE ADULT - SUBJECTIVE AND OBJECTIVE BOX
Patient is a 89y old  Female who presents with a chief complaint of covid pna, sepsis (22 Aug 2024 15:51)      SUBJECTIVE / OVERNIGHT EVENTS: ptn is chatty, cheerful, no dyspnea. has diffuse wheezing    MEDICATIONS  (STANDING):  amLODIPine   Tablet 5 milliGRAM(s) Oral daily  dextrose 5%. 1000 milliLiter(s) (100 mL/Hr) IV Continuous <Continuous>  dextrose 5%. 1000 milliLiter(s) (50 mL/Hr) IV Continuous <Continuous>  dextrose 50% Injectable 25 Gram(s) IV Push once  dextrose 50% Injectable 25 Gram(s) IV Push once  dextrose 50% Injectable 12.5 Gram(s) IV Push once  glucagon  Injectable 1 milliGRAM(s) IntraMuscular once  insulin lispro (ADMELOG) corrective regimen sliding scale   SubCutaneous three times a day before meals  pantoprazole    Tablet 40 milliGRAM(s) Oral before breakfast  predniSONE   Tablet 40 milliGRAM(s) Oral once  predniSONE   Tablet 5 milliGRAM(s) Oral daily  QUEtiapine 25 milliGRAM(s) Oral at bedtime    MEDICATIONS  (PRN):  acetaminophen     Tablet .. 650 milliGRAM(s) Oral every 6 hours PRN Temp greater or equal to 38C (100.4F), Mild Pain (1 - 3), Moderate Pain (4 - 6)  dextrose Oral Gel 15 Gram(s) Oral once PRN Blood Glucose LESS THAN 70 milliGRAM(s)/deciliter  guaiFENesin Oral Liquid (Sugar-Free) 100 milliGRAM(s) Oral every 6 hours PRN Cough      Vital Signs Last 24 Hrs  T(F): 98.5 (08-22-24 @ 13:40), Max: 99.5 (08-21-24 @ 21:49)  HR: 85 (08-22-24 @ 13:40) (71 - 91)  BP: 104/63 (08-22-24 @ 13:40) (104/63 - 153/86)  RR: 17 (08-22-24 @ 13:40) (16 - 18)  SpO2: 98% (08-22-24 @ 13:40) (95% - 98%)  Telemetry:   CAPILLARY BLOOD GLUCOSE      POCT Blood Glucose.: 119 mg/dL (22 Aug 2024 12:31)  POCT Blood Glucose.: 93 mg/dL (22 Aug 2024 08:47)  POCT Blood Glucose.: 91 mg/dL (21 Aug 2024 22:26)  POCT Blood Glucose.: 97 mg/dL (21 Aug 2024 17:07)    I&O's Summary      PHYSICAL EXAM:  GENERAL: NAD, well-developed  HEAD:  Atraumatic, Normocephalic  EYES: EOMI, PERRLA, conjunctiva and sclera clear  NECK: Supple, No JVD  CHEST/LUNG: Clear to auscultation bilaterally; No wheeze  HEART: Regular rate and rhythm; No murmurs, rubs, or gallops  ABDOMEN: Soft, Nontender, Nondistended; Bowel sounds present  EXTREMITIES:  2+ Peripheral Pulses, No clubbing, cyanosis, or edema  PSYCH: AAOx3  NEUROLOGY: non-focal  SKIN: No rashes or lesions    LABS:                        10.5   7.76  )-----------( 209      ( 22 Aug 2024 05:25 )             32.8     08-22    137  |  101  |  27<H>  ----------------------------<  78  3.3<L>   |  19<L>  |  1.37<H>    Ca    9.4      22 Aug 2024 05:25  Phos  2.7     08-22  Mg     1.60     08-22    TPro  6.8  /  Alb  3.4  /  TBili  0.3  /  DBili  <0.2  /  AST  24  /  ALT  15  /  AlkPhos  65  08-22    PT/INR - ( 22 Aug 2024 05:25 )   PT: 11.0 sec;   INR: 0.97 ratio

## 2024-08-22 NOTE — DISCHARGE NOTE PROVIDER - NSDCFUADDAPPT_GEN_ALL_CORE_FT
Follow up with your primary care physician for further monitoring in 1-2 weeks. Please call to arrange appointment.  Follow up with rheumatology within 1-2 weeks of discharge.  Follow up with pulmonology within 1-2 weeks of discharge.

## 2024-08-22 NOTE — PROGRESS NOTE ADULT - SUBJECTIVE AND OBJECTIVE BOX
NEPHROLOGY     Patient seen and examined resting comfortably on room air, reports feeling better, denies pain, no sob, in no acute distress.     MEDICATIONS  (STANDING):  amLODIPine   Tablet 5 milliGRAM(s) Oral daily  dextrose 5%. 1000 milliLiter(s) (50 mL/Hr) IV Continuous <Continuous>  dextrose 5%. 1000 milliLiter(s) (100 mL/Hr) IV Continuous <Continuous>  dextrose 50% Injectable 25 Gram(s) IV Push once  dextrose 50% Injectable 12.5 Gram(s) IV Push once  dextrose 50% Injectable 25 Gram(s) IV Push once  glucagon  Injectable 1 milliGRAM(s) IntraMuscular once  insulin lispro (ADMELOG) corrective regimen sliding scale   SubCutaneous three times a day before meals  pantoprazole    Tablet 40 milliGRAM(s) Oral before breakfast  predniSONE   Tablet 5 milliGRAM(s) Oral daily  QUEtiapine 25 milliGRAM(s) Oral at bedtime    VITALS:  T(C): , Max: 37.5 (08-21-24 @ 21:49)  T(F): , Max: 99.5 (08-21-24 @ 21:49)  HR: 71 (08-22-24 @ 09:30)  BP: 132/57 (08-22-24 @ 09:30)  BP(mean): 76 (08-22-24 @ 09:30)  RR: 16 (08-22-24 @ 09:30)  SpO2: 95% (08-22-24 @ 09:30)    PHYSICAL EXAM:  Constitutional: NAD  HEENT: EOMI  Neck:  No JVD, supple   Respiratory: CTA B/L  Cardiovascular: S1 and S2, RRR  Gastrointestinal: + BS, soft, NT, ND  Extremities: No peripheral edema, + peripheral pulses  Neurological: A/O x 3, CN2-12 intact  Psychiatric: Normal mood, normal affect  : No Jansen  Skin: No rashes, C/D/I    LABS:                        10.5   7.76  )-----------( 209      ( 22 Aug 2024 05:25 )             32.8     08-22    137  |  101  |  27<H>  ----------------------------<  78  3.3<L>   |  19<L>  |  1.37<H>    Ca    9.4      22 Aug 2024 05:25  Phos  2.7     08-22  Mg     1.60     08-22    TPro  6.8  /  Alb  3.4  /  TBili  0.3  /  DBili  <0.2  /  AST  24  /  ALT  15  /  AlkPhos  65  08-22    ASSESSMENT/PLAN:  89 y/o F with PMH of DM2, HTN, HLD, CVA/TIA, CKD, and pulmonary sarcoidosis, presents c/o productive cough x 3 days pw COVID   Hx of proteinuria   CKD stage 3b -CKD - nonproteinuric CKD3b - due to hypertension/atherosclerotic disease, +/- component 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     MEDICATIONS  (STANDING):  amLODIPine   Tablet 5 milliGRAM(s) Oral daily  dextrose 5%. 1000 milliLiter(s) (50 mL/Hr) IV Continuous <Continuous>  dextrose 5%. 1000 milliLiter(s) (100 mL/Hr) IV Continuous <Continuous>  dextrose 50% Injectable 25 Gram(s) IV Push once  dextrose 50% Injectable 12.5 Gram(s) IV Push once  dextrose 50% Injectable 25 Gram(s) IV Push once  glucagon  Injectable 1 milliGRAM(s) IntraMuscular once  insulin lispro (ADMELOG) corrective regimen sliding scale   SubCutaneous three times a day before meals  pantoprazole    Tablet 40 milliGRAM(s) Oral before breakfast  predniSONE   Tablet 5 milliGRAM(s) Oral daily  QUEtiapine 25 milliGRAM(s) Oral at bedtime    VITALS:  T(C): , Max: 37.5 (08-21-24 @ 21:49)  T(F): , Max: 99.5 (08-21-24 @ 21:49)  HR: 71 (08-22-24 @ 09:30)  BP: 132/57 (08-22-24 @ 09:30)  BP(mean): 76 (08-22-24 @ 09:30)  RR: 16 (08-22-24 @ 09:30)  SpO2: 95% (08-22-24 @ 09:30)    PHYSICAL EXAM:  Constitutional: NAD  HEENT: EOMI  Neck:  No JVD, supple   Respiratory: CTA B/L  Cardiovascular: S1 and S2, RRR  Gastrointestinal: + BS, soft, NT, ND  Extremities: No peripheral edema, + peripheral pulses  Neurological: A/O x 3, CN2-12 intact  Psychiatric: Normal mood, normal affect  : No Jansen  Skin: No rashes, C/D/I    LABS:                        10.5   7.76  )-----------( 209      ( 22 Aug 2024 05:25 )             32.8     08-22    137  |  101  |  27<H>  ----------------------------<  78  3.3<L>   |  19<L>  |  1.37<H>    Ca    9.4      22 Aug 2024 05:25  Phos  2.7     08-22  Mg     1.60     08-22    TPro  6.8  /  Alb  3.4  /  TBili  0.3  /  DBili  <0.2  /  AST  24  /  ALT  15  /  AlkPhos  65  08-22

## 2024-08-22 NOTE — DISCHARGE NOTE PROVIDER - CARE PROVIDER_API CALL
Emmy Paredes  [facility]  [address]  [place] [address]  Phone: [phone]  Fax: [phone]  Follow Up Time:     Ankur Redmond  Pulmonary Disease  [address] [place] [address]  Phone: [phone]  Fax: [phone]  Follow Up Time:

## 2024-08-22 NOTE — DISCHARGE NOTE PROVIDER - NSDCMRMEDTOKEN_GEN_ALL_CORE_FT
amLODIPine 5 mg oral tablet: 1 tab(s) orally once a day  guaiFENesin 100 mg/5 mL oral liquid: 5 milliliter(s) orally every 6 hours as needed for Cough  pantoprazole 40 mg oral delayed release tablet: 1 tab(s) orally once a day (before a meal)  predniSONE 20 mg oral tablet: 2 tab(s) orally once a day  predniSONE 5 mg oral tablet: 1 tab(s) orally once a day please resume after completed prednisone 20mg x 4 days  Seroquel 25 mg oral tablet: 1 tab(s) orally once a day (at bedtime) as needed

## 2024-08-22 NOTE — PROGRESS NOTE ADULT - SUBJECTIVE AND OBJECTIVE BOX
Date of Service: 08-22-24 @ 10:32    Patient is a 89y old  Female who presents with a chief complaint of covid pna, sepsis (21 Aug 2024 21:30)      Any change in ROS: she seems OK: Afebrile and o2 sao2is pretty good    MEDICATIONS  (STANDING):  amLODIPine   Tablet 5 milliGRAM(s) Oral daily  dextrose 5%. 1000 milliLiter(s) (50 mL/Hr) IV Continuous <Continuous>  dextrose 5%. 1000 milliLiter(s) (100 mL/Hr) IV Continuous <Continuous>  dextrose 50% Injectable 25 Gram(s) IV Push once  dextrose 50% Injectable 25 Gram(s) IV Push once  dextrose 50% Injectable 12.5 Gram(s) IV Push once  glucagon  Injectable 1 milliGRAM(s) IntraMuscular once  insulin lispro (ADMELOG) corrective regimen sliding scale   SubCutaneous three times a day before meals  pantoprazole    Tablet 40 milliGRAM(s) Oral before breakfast  predniSONE   Tablet 5 milliGRAM(s) Oral daily  QUEtiapine 25 milliGRAM(s) Oral at bedtime    MEDICATIONS  (PRN):  acetaminophen     Tablet .. 650 milliGRAM(s) Oral every 6 hours PRN Temp greater or equal to 38C (100.4F), Mild Pain (1 - 3), Moderate Pain (4 - 6)  dextrose Oral Gel 15 Gram(s) Oral once PRN Blood Glucose LESS THAN 70 milliGRAM(s)/deciliter  guaiFENesin Oral Liquid (Sugar-Free) 100 milliGRAM(s) Oral every 6 hours PRN Cough    Vital Signs Last 24 Hrs  T(C): 36.9 (22 Aug 2024 09:30), Max: 37.5 (21 Aug 2024 21:49)  T(F): 98.4 (22 Aug 2024 09:30), Max: 99.5 (21 Aug 2024 21:49)  HR: 71 (22 Aug 2024 09:30) (71 - 91)  BP: 132/57 (22 Aug 2024 09:30) (119/72 - 153/86)  BP(mean): 76 (22 Aug 2024 09:30) (76 - 76)  RR: 16 (22 Aug 2024 09:30) (16 - 18)  SpO2: 95% (22 Aug 2024 09:30) (93% - 99%)    Parameters below as of 22 Aug 2024 05:25  Patient On (Oxygen Delivery Method): room air        I&O's Summary        Physical Exam:   GENERAL: NAD, well-groomed, well-developed  HEENT: ROBIN/   Atraumatic, Normocephalic  ENMT: No tonsillar erythema, exudates, or enlargement; Moist mucous membranes, Good dentition, No lesions  NECK: Supple, No JVD, Normal thyroid  CHEST/LUNG: Clear to auscultaion  CVS: Regular rate and rhythm; No murmurs, rubs, or gallops  GI: : Soft, Nontender, Nondistended; Bowel sounds present  NERVOUS SYSTEM:  Alert & Oriented X3  EXTREMITIES:  2+ Peripheral Pulses, No clubbing, cyanosis, or edema  LYMPH: No lymphadenopathy noted  SKIN: No rashes or lesions  ENDOCRINOLOGY: No Thyromegaly  PSYCH: Appropriate    Labs:  27                            10.5   7.76  )-----------( 209      ( 22 Aug 2024 05:25 )             32.8                         9.9    9.55  )-----------( 234      ( 19 Aug 2024 23:25 )             31.1     08-22    137  |  101  |  27<H>  ----------------------------<  78  3.3<L>   |  19<L>  |  1.37<H>  08-21    x   |  x   |  x   ----------------------------<  x   x    |  x   |  1.28  08-20    x   |  x   |  x   ----------------------------<  x   x    |  x   |  1.25  08-19    139  |  101  |  13  ----------------------------<  89  3.2<L>   |  22  |  1.26    Ca    9.4      22 Aug 2024 05:25  Phos  2.7     08-22  Mg     1.60     08-22    TPro  6.8  /  Alb  3.4  /  TBili  0.3  /  DBili  <0.2  /  AST  24  /  ALT  15  /  AlkPhos  65  08-22  TPro  6.5  /  Alb  3.2<L>  /  TBili  0.3  /  DBili  <0.2  /  AST  27  /  ALT  20  /  AlkPhos  59  08-21  TPro  6.1  /  Alb  3.2<L>  /  TBili  0.3  /  DBili  <0.2  /  AST  23  /  ALT  21  /  AlkPhos  56  08-20  TPro  7.0  /  Alb  3.6  /  TBili  0.3  /  DBili  x   /  AST  25  /  ALT  25  /  AlkPhos  66  08-19    CAPILLARY BLOOD GLUCOSE      POCT Blood Glucose.: 93 mg/dL (22 Aug 2024 08:47)  POCT Blood Glucose.: 91 mg/dL (21 Aug 2024 22:26)  POCT Blood Glucose.: 97 mg/dL (21 Aug 2024 17:07)  POCT Blood Glucose.: 105 mg/dL (21 Aug 2024 12:09)      LIVER FUNCTIONS - ( 22 Aug 2024 05:25 )  Alb: 3.4 g/dL / Pro: 6.8 g/dL / ALK PHOS: 65 U/L / ALT: 15 U/L / AST: 24 U/L / GGT: x           PT/INR - ( 22 Aug 2024 05:25 )   PT: 11.0 sec;   INR: 0.97 ratio           Urinalysis Basic - ( 22 Aug 2024 05:25 )    Color: x / Appearance: x / SG: x / pH: x  Gluc: 78 mg/dL / Ketone: x  / Bili: x / Urobili: x   Blood: x / Protein: x / Nitrite: x   Leuk Esterase: x / RBC: x / WBC x   Sq Epi: x / Non Sq Epi: x / Bacteria: x      D-Dimer Assay, Quantitative: 844 ng/mL DDU (08-22 @ 05:25)  D-Dimer Assay, Quantitative: 891 ng/mL DDU (08-21 @ 06:40)  D-Dimer Assay, Quantitative: 586 ng/mL DDU (08-20 @ 11:20)    rad< from: CT Chest No Cont (08.11.24 @ 07:13) >  VESSELS: Within normal limits.  LYMPH NODES: No lymphadenopathy.  ABDOMINAL WALL: Within normal limits.  BONES: Degenerative changes.    IMPRESSION:  Compared to the chest CT from 8/2/2023, increased changes of interstitial   lung disease mainly involving the lower lobes with also increased   associated areas with subsegmental atelectasis mainly in the left lower   lobe. Bronchiectasis and linear airspace opacitiesare more pronounced as   well.. Superimposed acute on chronic infection or inflammation cannot be   excluded, particularly in the lower lobes.    Previously reported somewhat spiculated right lower lobe nodule on image   138 of series 303 measures 0.8 cm versus 0.9 cm on the prior chest CT,   stable however remains indeterminate.    Cholecystectomy.    Fluid-filled and mildly prominent small bowel loop; are nonspecific   however developing/resolving enterocolitis cannot be entirely excluded.        --- End of Report ---    < end of copied text >      RECENT CULTURES:  08-20 @ 00:30 .Blood Blood-Peripheral                No growth at 48 Hours    08-20 @ 00:15 .Blood Blood-Peripheral                No growth at 48 Hours          RESPIRATORY CULTURES:          Studies  Chest X-RAY  CT SCAN Chest   Venous Dopplers: LE:   CT Abdomen  Others

## 2024-08-22 NOTE — PROGRESS NOTE ADULT - REASON FOR ADMISSION
covid pna, sepsis

## 2024-08-22 NOTE — PROGRESS NOTE ADULT - PROVIDER SPECIALTY LIST ADULT
Infectious Disease
Nephrology
Pulmonology
Internal Medicine
Pulmonology
Nephrology
Infectious Disease
Internal Medicine

## 2024-09-11 ENCOUNTER — APPOINTMENT (OUTPATIENT)
Dept: GASTROENTEROLOGY | Facility: CLINIC | Age: 89
End: 2024-09-11

## 2025-01-13 NOTE — ED PROVIDER NOTE - RAPID ASSESSMENT
86 F with PMHx of sarcoidosis of lung, CKD on lasix, CVA, TIA, HTN presents to the ER c/o Lower chest pain x1 hr. Reports was eating a spicy dinner when she endorsed a coughing fit, chest pain, and episode of diaphoresis. Describes chest pain as a burning sharp sensation. Denies worsening SOB (pt has chronic SOB) Pt is well appearing.     Scribe Statement: ICharito Tiffany, attest that this documentation has been prepared under the direction and in the presence of Ray Palmer) HHA 35hrs/week 86 F with PMHx of sarcoidosis of lung, CKD on lasix, CVA, TIA, HTN presents to the ER c/o Lower chest pain x1 hr. Reports was eating a spicy dinner when she endorsed a coughing fit, chest pain, and episode of diaphoresis. Describes chest pain as a burning sharp sensation. Denies worsening SOB (pt has chronic SOB) Pt is well appearing.     [facility] ISLAS: Patient was seen using tele-video conferencing.  A brief medical screening was performed.  Initial labs/orders were entered based on information provided at the time of the patient's evaluation.  Care is to be resumed by the primary ED team.  The scribe's documentation has been prepared under my direction and personally reviewed by me in its entirety. I confirm that the note above accurately reflects all work, treatment, procedures, and medical decision making performed by me (Dr. Palmer).     Scribe Statement: I, Nara Mcneill, attest that this documentation has been prepared under the direction and in the presence of Ray Palmer) 86 F with PMHx of sarcoidosis of lung, CKD on lasix, CVA, TIA, HTN presents to the ER c/o Lower chest pain x1 hr. Reports was eating a spicy dinner when she endorsed a coughing fit, chest pain, and episode of diaphoresis. Describes chest pain as a burning sharp sensation. Denies worsening SOB (pt has chronic SOB) Pt is well appearing.     [facility] ISLAS: Patient was seen using tele-video conferencing.  A brief medical screening was performed.  Initial labs/orders were entered based on information provided at the time of the patient's evaluation.  Care is to be resumed by the primary ED team.  The scribe's documentation has been prepared under my direction and personally reviewed by me in its entirety. I confirm that the note above accurately reflects all work, treatment, procedures, and medical decision making performed by me (Dr. Palmer).     Scribe Statement: I, Nara Mcneill, attest that this documentation has been prepared under the direction and in the presence of Ray Palmer)    [facility] ISLAS: Patient was seen using tele-video conferencing.  A brief medical screening was performed.  Initial labs/orders were entered based on information provided at the time of the patient's evaluation.  Care is to be resumed by the primary ED team.  The scribe's documentation has been prepared under my direction and personally reviewed by me in its entirety. I confirm that the note above accurately reflects all work, treatment, procedures, and medical decision making performed by me (Dr. Palmer).

## 2025-01-15 ENCOUNTER — APPOINTMENT (OUTPATIENT)
Dept: CARDIOLOGY | Facility: CLINIC | Age: 89
End: 2025-01-15